# Patient Record
Sex: MALE | Race: WHITE | NOT HISPANIC OR LATINO | Employment: OTHER | URBAN - METROPOLITAN AREA
[De-identification: names, ages, dates, MRNs, and addresses within clinical notes are randomized per-mention and may not be internally consistent; named-entity substitution may affect disease eponyms.]

---

## 2024-01-22 ENCOUNTER — HOSPITAL ENCOUNTER (OUTPATIENT)
Facility: HOSPITAL | Age: 64
Discharge: HOME | End: 2024-01-22
Attending: INTERNAL MEDICINE | Admitting: INTERNAL MEDICINE
Payer: MEDICARE

## 2024-01-22 DIAGNOSIS — I25.10 ATHEROSCLEROSIS OF NATIVE CORONARY ARTERY OF NATIVE HEART, UNSPECIFIED WHETHER ANGINA PRESENT: ICD-10-CM

## 2024-01-22 DIAGNOSIS — I25.118 CORONARY ARTERY DISEASE OF NATIVE ARTERY OF NATIVE HEART WITH STABLE ANGINA PECTORIS: ICD-10-CM

## 2024-01-22 DIAGNOSIS — R07.9 CHEST PAIN: ICD-10-CM

## 2024-01-22 DIAGNOSIS — Z79.01 ANTICOAGULATED: ICD-10-CM

## 2024-01-22 DIAGNOSIS — I25.118 CORONARY ARTERY DISEASE OF NATIVE ARTERY OF NATIVE HEART WITH STABLE ANGINA PECTORIS: Primary | ICD-10-CM

## 2024-01-22 LAB
ALBUMIN SERPL-MCNC: 3.8 G/DL (ref 3.4–4.8)
ALBUMIN/GLOB SERPL: 1.5 RATIO (ref 1.1–2)
ALP SERPL-CCNC: 45 UNIT/L (ref 40–150)
ALT SERPL-CCNC: 31 UNIT/L (ref 0–55)
ANION GAP SERPL CALC-SCNC: 5 MEQ/L
APPEARANCE UR: CLEAR
APTT PPP: 50.3 SECONDS (ref 23.2–33.7)
AST SERPL-CCNC: 23 UNIT/L (ref 5–34)
BACTERIA #/AREA URNS AUTO: ABNORMAL /HPF
BASOPHILS # BLD AUTO: 0.05 X10(3)/MCL
BASOPHILS NFR BLD AUTO: 0.6 %
BILIRUB SERPL-MCNC: 0.4 MG/DL
BILIRUB UR QL STRIP.AUTO: NEGATIVE
BUN SERPL-MCNC: 26.3 MG/DL (ref 8.4–25.7)
BUN SERPL-MCNC: 27 MG/DL (ref 8.4–25.7)
CALCIUM SERPL-MCNC: 8.8 MG/DL (ref 8.8–10)
CALCIUM SERPL-MCNC: 9.5 MG/DL (ref 8.8–10)
CHLORIDE SERPL-SCNC: 105 MMOL/L (ref 98–107)
CHLORIDE SERPL-SCNC: 107 MMOL/L (ref 98–107)
CO2 SERPL-SCNC: 24 MMOL/L (ref 23–31)
CO2 SERPL-SCNC: 27 MMOL/L (ref 23–31)
COLOR UR AUTO: ABNORMAL
CREAT SERPL-MCNC: 1.43 MG/DL (ref 0.73–1.18)
CREAT SERPL-MCNC: 1.55 MG/DL (ref 0.73–1.18)
CREAT/UREA NIT SERPL: 17
EOSINOPHIL # BLD AUTO: 0.35 X10(3)/MCL (ref 0–0.9)
EOSINOPHIL NFR BLD AUTO: 4.5 %
ERYTHROCYTE [DISTWIDTH] IN BLOOD BY AUTOMATED COUNT: 14.1 % (ref 11.5–17)
GFR SERPLBLD CREATININE-BSD FMLA CKD-EPI: 49 MLS/MIN/1.73/M2
GFR SERPLBLD CREATININE-BSD FMLA CKD-EPI: 54 MLS/MIN/1.73/M2
GLOBULIN SER-MCNC: 2.5 GM/DL (ref 2.4–3.5)
GLUCOSE SERPL-MCNC: 110 MG/DL (ref 82–115)
GLUCOSE SERPL-MCNC: 160 MG/DL (ref 82–115)
GLUCOSE UR QL STRIP.AUTO: NORMAL
GROUP & RH: NORMAL
HCT VFR BLD AUTO: 42.7 % (ref 42–52)
HGB BLD-MCNC: 13.9 G/DL (ref 14–18)
IMM GRANULOCYTES # BLD AUTO: 0.02 X10(3)/MCL (ref 0–0.04)
IMM GRANULOCYTES NFR BLD AUTO: 0.3 %
INDIRECT COOMBS: NORMAL
KETONES UR QL STRIP.AUTO: NEGATIVE
LEUKOCYTE ESTERASE UR QL STRIP.AUTO: NEGATIVE
LYMPHOCYTES # BLD AUTO: 2.72 X10(3)/MCL (ref 0.6–4.6)
LYMPHOCYTES NFR BLD AUTO: 34.9 %
MCH RBC QN AUTO: 29.7 PG (ref 27–31)
MCHC RBC AUTO-ENTMCNC: 32.6 G/DL (ref 33–36)
MCV RBC AUTO: 91.2 FL (ref 80–94)
MONOCYTES # BLD AUTO: 0.44 X10(3)/MCL (ref 0.1–1.3)
MONOCYTES NFR BLD AUTO: 5.6 %
MRSA PCR SCRN (OHS): NOT DETECTED
NEUTROPHILS # BLD AUTO: 4.21 X10(3)/MCL (ref 2.1–9.2)
NEUTROPHILS NFR BLD AUTO: 54.1 %
NITRITE UR QL STRIP.AUTO: NEGATIVE
NRBC BLD AUTO-RTO: 0 %
PH UR STRIP.AUTO: 6 [PH]
PLATELET # BLD AUTO: 153 X10(3)/MCL (ref 130–400)
PMV BLD AUTO: 12.2 FL (ref 7.4–10.4)
POTASSIUM SERPL-SCNC: 4.6 MMOL/L (ref 3.5–5.1)
POTASSIUM SERPL-SCNC: 5 MMOL/L (ref 3.5–5.1)
PROT SERPL-MCNC: 6.3 GM/DL (ref 5.8–7.6)
PROT UR QL STRIP.AUTO: NEGATIVE
RBC # BLD AUTO: 4.68 X10(6)/MCL (ref 4.7–6.1)
RBC #/AREA URNS AUTO: ABNORMAL /HPF
RBC UR QL AUTO: NEGATIVE
SODIUM SERPL-SCNC: 137 MMOL/L (ref 136–145)
SODIUM SERPL-SCNC: 138 MMOL/L (ref 136–145)
SP GR UR STRIP.AUTO: >1.05 (ref 1–1.03)
SPECIMEN OUTDATE: NORMAL
SPERM URNS QL MICRO: ABNORMAL /HPF
SQUAMOUS #/AREA URNS LPF: ABNORMAL /HPF
UROBILINOGEN UR STRIP-ACNC: NORMAL
WBC # SPEC AUTO: 7.79 X10(3)/MCL (ref 4.5–11.5)
WBC #/AREA URNS AUTO: ABNORMAL /HPF

## 2024-01-22 PROCEDURE — 25500020 PHARM REV CODE 255: Performed by: INTERNAL MEDICINE

## 2024-01-22 PROCEDURE — 85025 COMPLETE CBC W/AUTO DIFF WBC: CPT | Performed by: INTERNAL MEDICINE

## 2024-01-22 PROCEDURE — 85347 COAGULATION TIME ACTIVATED: CPT | Performed by: INTERNAL MEDICINE

## 2024-01-22 PROCEDURE — 87641 MR-STAPH DNA AMP PROBE: CPT | Performed by: SPECIALIST

## 2024-01-22 PROCEDURE — 27201423 OPTIME MED/SURG SUP & DEVICES STERILE SUPPLY: Performed by: INTERNAL MEDICINE

## 2024-01-22 PROCEDURE — 93799 UNLISTED CV SVC/PROCEDURE: CPT | Performed by: INTERNAL MEDICINE

## 2024-01-22 PROCEDURE — 63600175 PHARM REV CODE 636 W HCPCS: Performed by: INTERNAL MEDICINE

## 2024-01-22 PROCEDURE — 99152 MOD SED SAME PHYS/QHP 5/>YRS: CPT | Performed by: INTERNAL MEDICINE

## 2024-01-22 PROCEDURE — C1887 CATHETER, GUIDING: HCPCS | Performed by: INTERNAL MEDICINE

## 2024-01-22 PROCEDURE — 93010 ELECTROCARDIOGRAM REPORT: CPT | Mod: ,,, | Performed by: INTERNAL MEDICINE

## 2024-01-22 PROCEDURE — 86850 RBC ANTIBODY SCREEN: CPT | Performed by: SPECIALIST

## 2024-01-22 PROCEDURE — 25000003 PHARM REV CODE 250: Performed by: INTERNAL MEDICINE

## 2024-01-22 PROCEDURE — 93005 ELECTROCARDIOGRAM TRACING: CPT

## 2024-01-22 PROCEDURE — 80053 COMPREHEN METABOLIC PANEL: CPT | Performed by: INTERNAL MEDICINE

## 2024-01-22 PROCEDURE — 81001 URINALYSIS AUTO W/SCOPE: CPT | Performed by: INTERNAL MEDICINE

## 2024-01-22 PROCEDURE — C1894 INTRO/SHEATH, NON-LASER: HCPCS | Performed by: INTERNAL MEDICINE

## 2024-01-22 PROCEDURE — 93459 L HRT ART/GRFT ANGIO: CPT | Performed by: INTERNAL MEDICINE

## 2024-01-22 PROCEDURE — 85730 THROMBOPLASTIN TIME PARTIAL: CPT | Performed by: INTERNAL MEDICINE

## 2024-01-22 PROCEDURE — 99153 MOD SED SAME PHYS/QHP EA: CPT | Performed by: INTERNAL MEDICINE

## 2024-01-22 PROCEDURE — C1769 GUIDE WIRE: HCPCS | Performed by: INTERNAL MEDICINE

## 2024-01-22 PROCEDURE — 80048 BASIC METABOLIC PNL TOTAL CA: CPT | Performed by: INTERNAL MEDICINE

## 2024-01-22 RX ORDER — MUPIROCIN 20 MG/G
OINTMENT TOPICAL
Status: CANCELLED | OUTPATIENT
Start: 2024-01-22 | End: 2024-01-22

## 2024-01-22 RX ORDER — MORPHINE SULFATE 4 MG/ML
2 INJECTION, SOLUTION INTRAMUSCULAR; INTRAVENOUS EVERY 4 HOURS PRN
Status: DISCONTINUED | OUTPATIENT
Start: 2024-01-22 | End: 2024-01-22 | Stop reason: HOSPADM

## 2024-01-22 RX ORDER — FENTANYL CITRATE 50 UG/ML
INJECTION, SOLUTION INTRAMUSCULAR; INTRAVENOUS
Status: DISCONTINUED | OUTPATIENT
Start: 2024-01-22 | End: 2024-01-22 | Stop reason: HOSPADM

## 2024-01-22 RX ORDER — SODIUM CHLORIDE 9 MG/ML
INJECTION, SOLUTION INTRAVENOUS ONCE
Status: COMPLETED | OUTPATIENT
Start: 2024-01-22 | End: 2024-01-22

## 2024-01-22 RX ORDER — NITROGLYCERIN 20 MG/100ML
INJECTION INTRAVENOUS
Status: DISCONTINUED | OUTPATIENT
Start: 2024-01-22 | End: 2024-01-22 | Stop reason: HOSPADM

## 2024-01-22 RX ORDER — HEPARIN SODIUM 1000 [USP'U]/ML
INJECTION, SOLUTION INTRAVENOUS; SUBCUTANEOUS
Status: DISCONTINUED | OUTPATIENT
Start: 2024-01-22 | End: 2024-01-22 | Stop reason: HOSPADM

## 2024-01-22 RX ORDER — ASPIRIN 81 MG/1
81 TABLET ORAL ONCE
COMMUNITY

## 2024-01-22 RX ORDER — DIAZEPAM 5 MG/1
10 TABLET ORAL ONCE
Status: COMPLETED | OUTPATIENT
Start: 2024-01-22 | End: 2024-01-22

## 2024-01-22 RX ORDER — METOPROLOL TARTRATE 25 MG/1
25 TABLET, FILM COATED ORAL 2 TIMES DAILY
Status: ON HOLD | COMMUNITY
End: 2024-02-06 | Stop reason: HOSPADM

## 2024-01-22 RX ORDER — MIDAZOLAM HYDROCHLORIDE 1 MG/ML
INJECTION INTRAMUSCULAR; INTRAVENOUS
Status: DISCONTINUED | OUTPATIENT
Start: 2024-01-22 | End: 2024-01-22 | Stop reason: HOSPADM

## 2024-01-22 RX ORDER — HYDROCODONE BITARTRATE AND ACETAMINOPHEN 500; 5 MG/1; MG/1
TABLET ORAL
Status: CANCELLED | OUTPATIENT
Start: 2024-01-22

## 2024-01-22 RX ORDER — ASPIRIN 325 MG
50000 TABLET, DELAYED RELEASE (ENTERIC COATED) ORAL
COMMUNITY
Start: 2024-01-05

## 2024-01-22 RX ORDER — DIPHENHYDRAMINE HCL 25 MG
50 CAPSULE ORAL
Status: DISCONTINUED | OUTPATIENT
Start: 2024-01-22 | End: 2024-01-22 | Stop reason: HOSPADM

## 2024-01-22 RX ORDER — ATORVASTATIN CALCIUM 20 MG/1
20 TABLET, FILM COATED ORAL NIGHTLY
Status: ON HOLD | COMMUNITY
Start: 2024-01-18 | End: 2024-02-06

## 2024-01-22 RX ORDER — TACROLIMUS 1 MG/1
1 CAPSULE ORAL
COMMUNITY
Start: 2023-10-25

## 2024-01-22 RX ORDER — SODIUM CHLORIDE 0.9 % (FLUSH) 0.9 %
10 SYRINGE (ML) INJECTION
Status: DISCONTINUED | OUTPATIENT
Start: 2024-01-22 | End: 2024-01-22 | Stop reason: HOSPADM

## 2024-01-22 RX ORDER — ESCITALOPRAM OXALATE 10 MG/1
10 TABLET ORAL EVERY MORNING
COMMUNITY
Start: 2023-12-12

## 2024-01-22 RX ORDER — VERAPAMIL HYDROCHLORIDE 2.5 MG/ML
INJECTION, SOLUTION INTRAVENOUS
Status: DISCONTINUED | OUTPATIENT
Start: 2024-01-22 | End: 2024-01-22 | Stop reason: HOSPADM

## 2024-01-22 RX ORDER — AMLODIPINE BESYLATE 5 MG/1
5 TABLET ORAL NIGHTLY
Status: ON HOLD | COMMUNITY
End: 2024-02-06 | Stop reason: HOSPADM

## 2024-01-22 RX ORDER — LIDOCAINE HYDROCHLORIDE 10 MG/ML
INJECTION INFILTRATION; PERINEURAL
Status: DISCONTINUED | OUTPATIENT
Start: 2024-01-22 | End: 2024-01-22 | Stop reason: HOSPADM

## 2024-01-22 RX ORDER — CLOPIDOGREL BISULFATE 75 MG/1
75 TABLET ORAL DAILY
Status: ON HOLD | COMMUNITY
End: 2024-02-06 | Stop reason: HOSPADM

## 2024-01-22 RX ORDER — SODIUM CHLORIDE 9 MG/ML
INJECTION, SOLUTION INTRAVENOUS CONTINUOUS
Status: DISCONTINUED | OUTPATIENT
Start: 2024-01-22 | End: 2024-01-22 | Stop reason: HOSPADM

## 2024-01-22 RX ORDER — OLMESARTAN MEDOXOMIL 20 MG/1
20 TABLET ORAL DAILY
Status: ON HOLD | COMMUNITY
Start: 2024-01-18 | End: 2024-02-06 | Stop reason: HOSPADM

## 2024-01-22 RX ORDER — LANOLIN ALCOHOL/MO/W.PET/CERES
400 CREAM (GRAM) TOPICAL 2 TIMES DAILY
COMMUNITY

## 2024-01-22 RX ORDER — ACETAMINOPHEN 325 MG/1
650 TABLET ORAL EVERY 4 HOURS PRN
Status: DISCONTINUED | OUTPATIENT
Start: 2024-01-22 | End: 2024-01-22 | Stop reason: HOSPADM

## 2024-01-22 RX ORDER — HYDROCODONE BITARTRATE AND ACETAMINOPHEN 5; 325 MG/1; MG/1
1 TABLET ORAL EVERY 4 HOURS PRN
Status: DISCONTINUED | OUTPATIENT
Start: 2024-01-22 | End: 2024-01-22 | Stop reason: HOSPADM

## 2024-01-22 RX ORDER — ALENDRONATE SODIUM 35 MG/1
35 TABLET ORAL
COMMUNITY
Start: 2023-11-01

## 2024-01-22 RX ORDER — ONDANSETRON HYDROCHLORIDE 2 MG/ML
4 INJECTION, SOLUTION INTRAVENOUS EVERY 8 HOURS PRN
Status: DISCONTINUED | OUTPATIENT
Start: 2024-01-22 | End: 2024-01-22 | Stop reason: HOSPADM

## 2024-01-22 RX ORDER — HYDRALAZINE HYDROCHLORIDE 20 MG/ML
10 INJECTION INTRAMUSCULAR; INTRAVENOUS EVERY 4 HOURS PRN
Status: DISCONTINUED | OUTPATIENT
Start: 2024-01-22 | End: 2024-01-22 | Stop reason: HOSPADM

## 2024-01-22 RX ADMIN — DIAZEPAM 10 MG: 5 TABLET ORAL at 10:01

## 2024-01-22 RX ADMIN — SODIUM CHLORIDE: 9 INJECTION, SOLUTION INTRAVENOUS at 08:01

## 2024-01-22 RX ADMIN — SODIUM CHLORIDE: 9 INJECTION, SOLUTION INTRAVENOUS at 12:01

## 2024-01-22 RX ADMIN — DIPHENHYDRAMINE HYDROCHLORIDE 50 MG: 25 CAPSULE ORAL at 10:01

## 2024-01-22 NOTE — Clinical Note
The catheter was inserted into the and was inserted over the wire into the ostium   left main. An angiography was performed of the left coronary arteries. Multiple views were taken. The angiography was performed via power injection.

## 2024-01-22 NOTE — H&P (VIEW-ONLY)
[facility]   Consult Note  Cardiothoracic Surgery    SUBJECTIVE:     Chief Complaint/Reason for Admission: abnormal pet    History of Present Illness:  Patient is a 63 y.o. male presents with abnormal pet, [facility] shows cad  Hx liver transplant.      Family History of Heart Disease: no    No current facility-administered medications on file prior to encounter.     Current Outpatient Medications on File Prior to Encounter   Medication Sig Dispense Refill    alendronate (FOSAMAX) 35 MG tablet Take 35 mg by mouth every 7 days.      amLODIPine (NORVASC) 5 MG tablet Take 5 mg by mouth.      aspirin (ECOTRIN) 81 MG EC tablet aspirin 81 mg tablet,delayed release      atorvastatin (LIPITOR) 20 MG tablet Take 20 mg by mouth.      cholecalciferol, vitamin D3, 1,250 mcg (50,000 unit) capsule Take 50,000 Units by mouth every 7 days.      clopidogreL (PLAVIX) 75 mg tablet Take 75 mg by mouth.      EScitalopram oxalate (LEXAPRO) 10 MG tablet Take 10 mg by mouth every morning.      magnesium oxide (MAG-OX) 400 mg (241.3 mg magnesium) tablet Take 400 mg by mouth 2 (two) times daily.      metoprolol tartrate (LOPRESSOR) 25 MG tablet Take 25 mg by mouth 2 (two) times daily.      olmesartan (BENICAR) 20 MG tablet Take 20 mg by mouth.      rifAXIMin (XIFAXAN) 550 mg Tab Take 550 mg by mouth.      tacrolimus (PROGRAF) 1 MG Cap Take 1 mg by mouth.      [DISCONTINUED] pantoprazole (PROTONIX) 40 MG tablet Take 40 mg by mouth once daily.      [DISCONTINUED] propranolol (INDERAL) 10 MG tablet Take 10 mg by mouth 2 (two) times daily.          Review of patient's allergies indicates:  No Known Allergies     Past Medical History:   Diagnosis Date    CAD (coronary artery disease)     Cirrhosis     Depression     HCV (hepatitis C virus)         Past Surgical History:   Procedure Laterality Date    APPENDECTOMY      CHOLECYSTECTOMY      laprascopic    LIVER TRANSPLANT             Review of Systems:  Review of Systems   All other systems  reviewed and are negative.       OBJECTIVE:        Physical Exam:  Physical Exam  Vitals reviewed.   HENT:      Head: Normocephalic.      Right Ear: Tympanic membrane normal.      Left Ear: Tympanic membrane normal.      Nose: Nose normal.      Mouth/Throat:      Mouth: Mucous membranes are moist.   Eyes:      Pupils: Pupils are equal, round, and reactive to light.   Cardiovascular:      Rate and Rhythm: Normal rate and regular rhythm.      Pulses: Normal pulses.   Pulmonary:      Effort: Pulmonary effort is normal.      Breath sounds: Normal breath sounds.   Abdominal:      Palpations: Abdomen is soft.   Musculoskeletal:         General: Normal range of motion.      Cervical back: Normal range of motion.   Skin:     General: Skin is warm.   Neurological:      General: No focal deficit present.      Mental Status: He is alert.   Psychiatric:         Mood and Affect: Mood normal.          Laboratory:  I have reviewed all pertinent lab results within the past 24 hours.    Diagnostic Results:  Cardiac Cath: Reviewed          ASSESSMENT/PLAN:     A: CAD  P: TOBI 1/31 - will reach out to liver transplant md about immuno drugs

## 2024-01-22 NOTE — DISCHARGE SUMMARY
[facility] - Cath Lab Services  Discharge Note  Short Stay    Procedure(s) (LRB):  Left heart cath (N/A)      OUTCOME: Patient tolerated treatment/procedure well without complication and is now ready for discharge.    DISPOSITION: Home or Self Care    FINAL DIAGNOSIS:  <principal problem not specified>    FOLLOWUP: In clinic    DISCHARGE INSTRUCTIONS:  No discharge procedures on file.     TIME SPENT ON DISCHARGE:

## 2024-01-22 NOTE — Clinical Note
The catheter was removed from the and was repositioned into the ostial LIMA graft. An angiography was performed of the LIMA. The angiography was performed via power injection.

## 2024-01-22 NOTE — CONSULTS
[facility]   Consult Note  Cardiothoracic Surgery    SUBJECTIVE:     Chief Complaint/Reason for Admission: abnormal pet    History of Present Illness:  Patient is a 63 y.o. male presents with abnormal pet, [facility] shows cad  Hx liver transplant.      Family History of Heart Disease: no    No current facility-administered medications on file prior to encounter.     Current Outpatient Medications on File Prior to Encounter   Medication Sig Dispense Refill    alendronate (FOSAMAX) 35 MG tablet Take 35 mg by mouth every 7 days.      amLODIPine (NORVASC) 5 MG tablet Take 5 mg by mouth.      aspirin (ECOTRIN) 81 MG EC tablet aspirin 81 mg tablet,delayed release      atorvastatin (LIPITOR) 20 MG tablet Take 20 mg by mouth.      cholecalciferol, vitamin D3, 1,250 mcg (50,000 unit) capsule Take 50,000 Units by mouth every 7 days.      clopidogreL (PLAVIX) 75 mg tablet Take 75 mg by mouth.      EScitalopram oxalate (LEXAPRO) 10 MG tablet Take 10 mg by mouth every morning.      magnesium oxide (MAG-OX) 400 mg (241.3 mg magnesium) tablet Take 400 mg by mouth 2 (two) times daily.      metoprolol tartrate (LOPRESSOR) 25 MG tablet Take 25 mg by mouth 2 (two) times daily.      olmesartan (BENICAR) 20 MG tablet Take 20 mg by mouth.      rifAXIMin (XIFAXAN) 550 mg Tab Take 550 mg by mouth.      tacrolimus (PROGRAF) 1 MG Cap Take 1 mg by mouth.      [DISCONTINUED] pantoprazole (PROTONIX) 40 MG tablet Take 40 mg by mouth once daily.      [DISCONTINUED] propranolol (INDERAL) 10 MG tablet Take 10 mg by mouth 2 (two) times daily.          Review of patient's allergies indicates:  No Known Allergies     Past Medical History:   Diagnosis Date    CAD (coronary artery disease)     Cirrhosis     Depression     HCV (hepatitis C virus)         Past Surgical History:   Procedure Laterality Date    APPENDECTOMY      CHOLECYSTECTOMY      laprascopic    LIVER TRANSPLANT             Review of Systems:  Review of Systems   All other systems  reviewed and are negative.       OBJECTIVE:        Physical Exam:  Physical Exam  Vitals reviewed.   HENT:      Head: Normocephalic.      Right Ear: Tympanic membrane normal.      Left Ear: Tympanic membrane normal.      Nose: Nose normal.      Mouth/Throat:      Mouth: Mucous membranes are moist.   Eyes:      Pupils: Pupils are equal, round, and reactive to light.   Cardiovascular:      Rate and Rhythm: Normal rate and regular rhythm.      Pulses: Normal pulses.   Pulmonary:      Effort: Pulmonary effort is normal.      Breath sounds: Normal breath sounds.   Abdominal:      Palpations: Abdomen is soft.   Musculoskeletal:         General: Normal range of motion.      Cervical back: Normal range of motion.   Skin:     General: Skin is warm.   Neurological:      General: No focal deficit present.      Mental Status: He is alert.   Psychiatric:         Mood and Affect: Mood normal.          Laboratory:  I have reviewed all pertinent lab results within the past 24 hours.    Diagnostic Results:  Cardiac Cath: Reviewed          ASSESSMENT/PLAN:     A: CAD  P: TOBI 1/31 - will reach out to liver transplant md about immuno drugs

## 2024-01-22 NOTE — INTERVAL H&P NOTE
Patient name: Radu Rock  MRN: [id number]  : 1960  Cath Lab Procedure H&P Update    Pre-Procedure Assessment:    I saw and examined the patient face to face. The patient has been re-evaluated and his condition is unchanged. The reason for admission, procedure and care is still present.  Based on the patients H&P, pre-procedure physical exam, relevant diagnostic studies, NPO status and information obtained from the patient, I determined the patient is an appropriate candidate for the proposed procedure and anesthesia planned. I further certify the anesthesia risks, benefits and options have been explained to the patient to which he agrees as documented on the procedural consent.

## 2024-01-24 ENCOUNTER — ANESTHESIA EVENT (OUTPATIENT)
Dept: SURGERY | Facility: HOSPITAL | Age: 64
DRG: 235 | End: 2024-01-24
Payer: MEDICARE

## 2024-01-25 ENCOUNTER — LAB VISIT (OUTPATIENT)
Dept: LAB | Facility: HOSPITAL | Age: 64
End: 2024-01-25
Attending: SPECIALIST
Payer: MEDICARE

## 2024-01-25 VITALS
SYSTOLIC BLOOD PRESSURE: 116 MMHG | BODY MASS INDEX: 32.72 KG/M2 | DIASTOLIC BLOOD PRESSURE: 66 MMHG | HEIGHT: 69 IN | OXYGEN SATURATION: 97 % | TEMPERATURE: 98 F | RESPIRATION RATE: 16 BRPM | WEIGHT: 220.88 LBS | HEART RATE: 58 BPM

## 2024-01-25 DIAGNOSIS — I25.118 CORONARY ARTERY DISEASE OF NATIVE ARTERY OF NATIVE HEART WITH STABLE ANGINA PECTORIS: ICD-10-CM

## 2024-01-25 DIAGNOSIS — I25.118 CORONARY ARTERY DISEASE OF NATIVE ARTERY OF NATIVE HEART WITH STABLE ANGINA PECTORIS: Primary | ICD-10-CM

## 2024-01-25 DIAGNOSIS — Z79.01 ANTICOAGULATED: ICD-10-CM

## 2024-01-25 LAB
CLOSURE TME COLL+ADP BLD: >300 SECONDS (ref 46–119)
CLOSURE TME COLL+EPINEP BLD: 167 SECONDS (ref 68–183)
GROUP & RH: NORMAL
INDIRECT COOMBS: NORMAL
SPECIMEN OUTDATE: NORMAL

## 2024-01-25 PROCEDURE — 85576 BLOOD PLATELET AGGREGATION: CPT

## 2024-01-25 PROCEDURE — 86901 BLOOD TYPING SEROLOGIC RH(D): CPT | Performed by: SPECIALIST

## 2024-01-25 PROCEDURE — 36415 COLL VENOUS BLD VENIPUNCTURE: CPT

## 2024-01-30 NOTE — PRE-PROCEDURE INSTRUCTIONS
"[facility]: Outpatient Surgery  Preprocedure Check-In Instructions     Your arrival time for your surgery or procedure is __per MD____.  We ask patients to arrive about 2 hours before surgery to allow for enough time to review your health history & medications, start your IV, complete any outstanding labwork or tests, and meet your Anesthesiologist.    Expectations: "Because of inconsistent procedure completion times, an unexpected wait may occur. The Physicians would like you to be here to prepare in the event they run ahead of time. We will make you as comfortable as possible and keep you informed. We apologize in advance if this happens."    You will arrive at [facility], [address], [place].  Enter through the [place] entrance next to the Emergency Room, and come to the 6th floor to the Outpatient Surgery Department.     Visitory Policy:  You are allowed 2 adult visitors to be with you in the hospital. All hospital visitors should be in good current health.  No small children.     What to Bring:  Please have your ID, insurance cards, and all home medication bottles with you at check in.  Bring your CPAP machine if one is used at home.     Fasting:  Nothing to eat or drink after midnight the night before your procedure. This includes no ice, gum, hard candies, and/or tobacco products.  Follow your doctor's instructions for taking any medications on the morning of your procedure.  If no instructions for taking medications were given, do not take any medications but bring your medications in their bottles to your procedure check in.     Follow your doctor's preoperative instructions regarding skin prep, bowel prep, bathing, or showering prior to your procedure.  If any special soaps were provided to you, please use according to your doctor's instructions. If no instructions were given from your doctor, take a good bath or shower with antibacterial soap the night " before and the morning of your procedure.  On the morning of procedure, wear loose, comfortable clothing.  No lotions, makeup, perfumes, colognes, deodorant, or jewelry to your procedure.  Removable items (glasses, contact lenses, dentures, retainers, hearing aids) need to be removed for your procedure.  Bring your storage containers for these items if you wear them.     Artificial nails, body jewelry, eyelash extensions, and/or hair extensions with metal clips are not allowed during your surgery.  If you currently wear any of these items, please arrange for them to be removed prior to your arrival to the hospital.     Outpatient or Same Day Surgeries:  Any patients receiving sedation/anesthesia are advised not to drive for 24 hours after their procedure.  We do not allow patients to drive themselves home after discharge.  If you are going home after your procedure, please have someone available to drive you home from the hospital.        You may call the Outpatient Surgery Department at [phone] with any questions or concerns.  We are looking forward to meeting you and taking great care of you for your procedure.  Thank you for choosing [facility] for your surgical needs.

## 2024-01-31 ENCOUNTER — HOSPITAL ENCOUNTER (INPATIENT)
Facility: HOSPITAL | Age: 64
LOS: 6 days | Discharge: HOME HEALTH | DRG: 235 | End: 2024-02-06
Attending: SPECIALIST | Admitting: INTERNAL MEDICINE
Payer: MEDICARE

## 2024-01-31 ENCOUNTER — ANESTHESIA (OUTPATIENT)
Dept: SURGERY | Facility: HOSPITAL | Age: 64
DRG: 235 | End: 2024-01-31
Payer: MEDICARE

## 2024-01-31 DIAGNOSIS — I25.10 CORONARY ARTERY DISEASE: ICD-10-CM

## 2024-01-31 DIAGNOSIS — I48.91 A-FIB: ICD-10-CM

## 2024-01-31 DIAGNOSIS — Z79.01 ANTICOAGULATED: ICD-10-CM

## 2024-01-31 DIAGNOSIS — I25.10 CAD (CORONARY ARTERY DISEASE): ICD-10-CM

## 2024-01-31 DIAGNOSIS — I25.118 CORONARY ARTERY DISEASE OF NATIVE ARTERY OF NATIVE HEART WITH STABLE ANGINA PECTORIS: ICD-10-CM

## 2024-01-31 LAB
ALLENS TEST BLOOD GAS (OHS): ABNORMAL
ALLENS TEST BLOOD GAS (OHS): ABNORMAL
ANION GAP SERPL CALC-SCNC: 10 MEQ/L
APTT PPP: 27 SECONDS (ref 23.2–33.7)
APTT PPP: 29.4 SECONDS (ref 23.2–33.7)
BASE EXCESS BLD CALC-SCNC: -4.5 MMOL/L
BASE EXCESS BLD CALC-SCNC: 1.1 MMOL/L (ref -2–2)
BASOPHILS # BLD AUTO: 0.09 X10(3)/MCL
BASOPHILS NFR BLD AUTO: 0.4 %
BLOOD GAS SAMPLE TYPE (OHS): ABNORMAL
BLOOD GAS SAMPLE TYPE (OHS): ABNORMAL
BSA FOR ECHO PROCEDURE: 2.23 M2
BUN SERPL-MCNC: 18.7 MG/DL (ref 8.4–25.7)
CA-I BLD-SCNC: 1.17 MMOL/L (ref 1.12–1.23)
CA-I BLD-SCNC: 1.19 MMOL/L (ref 1.12–1.23)
CALCIUM SERPL-MCNC: 9.5 MG/DL (ref 8.8–10)
CHLORIDE SERPL-SCNC: 114 MMOL/L (ref 98–107)
CO2 BLDA-SCNC: 23 MMOL/L
CO2 BLDA-SCNC: 28 MMOL/L
CO2 SERPL-SCNC: 21 MMOL/L (ref 23–31)
COHGB MFR BLDA: 0.3 % (ref 0.5–1.5)
CPAP BLOOD GAS (OHS): 5 CM H2O
CREAT SERPL-MCNC: 1.13 MG/DL (ref 0.73–1.18)
CREAT/UREA NIT SERPL: 17
DRAWN BY BLOOD GAS (OHS): ABNORMAL
DRAWN BY BLOOD GAS (OHS): ABNORMAL
EOSINOPHIL # BLD AUTO: 0.26 X10(3)/MCL (ref 0–0.9)
EOSINOPHIL NFR BLD AUTO: 1.1 %
ERYTHROCYTE [DISTWIDTH] IN BLOOD BY AUTOMATED COUNT: 14.4 % (ref 11.5–17)
FIO2: 100
FIO2: 55
FIO2: 70
GFR SERPLBLD CREATININE-BSD FMLA CKD-EPI: >60 MLS/MIN/1.73/M2
GLUCOSE SERPL-MCNC: 107 MG/DL (ref 70–110)
GLUCOSE SERPL-MCNC: 159 MG/DL (ref 70–110)
GLUCOSE SERPL-MCNC: 159 MG/DL (ref 82–115)
GLUCOSE SERPL-MCNC: 168 MG/DL (ref 70–110)
GLUCOSE SERPL-MCNC: 169 MG/DL (ref 70–110)
GLUCOSE SERPL-MCNC: 179 MG/DL (ref 70–110)
GLUCOSE SERPL-MCNC: 192 MG/DL (ref 70–110)
GLUCOSE SERPL-MCNC: 199 MG/DL (ref 70–110)
HCO3 BLDA-SCNC: 21.7 MMOL/L (ref 22–26)
HCO3 BLDA-SCNC: 26.6 MMOL/L (ref 22–26)
HCO3 UR-SCNC: 22.4 MMOL/L (ref 24–28)
HCO3 UR-SCNC: 23.2 MMOL/L (ref 24–28)
HCO3 UR-SCNC: 24.6 MMOL/L (ref 24–28)
HCO3 UR-SCNC: 25 MMOL/L (ref 24–28)
HCO3 UR-SCNC: 25.3 MMOL/L (ref 24–28)
HCO3 UR-SCNC: 26.3 MMOL/L (ref 24–28)
HCO3 UR-SCNC: 26.8 MMOL/L (ref 24–28)
HCT VFR BLD AUTO: 30.1 % (ref 42–52)
HCT VFR BLD AUTO: 33.9 % (ref 42–52)
HCT VFR BLD CALC: 26 %PCV (ref 36–54)
HCT VFR BLD CALC: 26 %PCV (ref 36–54)
HCT VFR BLD CALC: 27 %PCV (ref 36–54)
HCT VFR BLD CALC: 27 %PCV (ref 36–54)
HCT VFR BLD CALC: 30 %PCV (ref 36–54)
HCT VFR BLD CALC: 35 %PCV (ref 36–54)
HCT VFR BLD CALC: 36 %PCV (ref 36–54)
HGB BLD-MCNC: 10 G/DL
HGB BLD-MCNC: 10.2 G/DL (ref 14–18)
HGB BLD-MCNC: 11 G/DL (ref 14–18)
HGB BLD-MCNC: 12 G/DL
HGB BLD-MCNC: 12 G/DL
HGB BLD-MCNC: 9 G/DL
IMM GRANULOCYTES # BLD AUTO: 0.14 X10(3)/MCL (ref 0–0.04)
IMM GRANULOCYTES NFR BLD AUTO: 0.6 %
INHALED O2 CONCENTRATION: 21 %
INHALED O2 CONCENTRATION: 35 %
INR PPP: 1.2
INR PPP: 2
LYMPHOCYTES # BLD AUTO: 4.05 X10(3)/MCL (ref 0.6–4.6)
LYMPHOCYTES NFR BLD AUTO: 17.8 %
MCH RBC QN AUTO: 30 PG (ref 27–31)
MCHC RBC AUTO-ENTMCNC: 32.4 G/DL (ref 33–36)
MCV RBC AUTO: 92.4 FL (ref 80–94)
METHGB MFR BLDA: 0 % (ref 0.4–1.5)
MODE (OHS): ABNORMAL
MONOCYTES # BLD AUTO: 1.65 X10(3)/MCL (ref 0.1–1.3)
MONOCYTES NFR BLD AUTO: 7.3 %
NEUTROPHILS # BLD AUTO: 16.5 X10(3)/MCL (ref 2.1–9.2)
NEUTROPHILS NFR BLD AUTO: 72.8 %
NRBC BLD AUTO-RTO: 0 %
O2 HB BLOOD GAS (OHS): 92.6 % (ref 94–97)
OXYGEN DEVICE BLOOD GAS (OHS): ABNORMAL
OXYHGB MFR BLDA: 12.8 G/DL (ref 12–16)
PCO2 BLDA: 42.1 MMHG (ref 35–45)
PCO2 BLDA: 43 MMHG (ref 35–45)
PCO2 BLDA: 43 MMHG (ref 35–45)
PCO2 BLDA: 43.5 MMHG (ref 35–45)
PCO2 BLDA: 44.8 MMHG (ref 35–45)
PCO2 BLDA: 45 MMHG (ref 35–45)
PCO2 BLDA: 45.2 MMHG (ref 35–45)
PCO2 BLDA: 51.4 MMHG (ref 35–45)
PCO2 BLDA: 56.4 MMHG (ref 35–45)
PH BLDA: 7.31 [PH] (ref 7.35–7.45)
PH BLDA: 7.38 [PH] (ref 7.35–7.45)
PH SMN: 7.29 [PH] (ref 7.35–7.45)
PH SMN: 7.31 [PH] (ref 7.35–7.45)
PH SMN: 7.32 [PH] (ref 7.35–7.45)
PH SMN: 7.35 [PH] (ref 7.35–7.45)
PH SMN: 7.35 [PH] (ref 7.35–7.45)
PH SMN: 7.37 [PH] (ref 7.35–7.45)
PH SMN: 7.37 [PH] (ref 7.35–7.45)
PLATELET # BLD AUTO: 102 X10(3)/MCL (ref 130–400)
PMV BLD AUTO: 12.7 FL (ref 7.4–10.4)
PO2 BLDA: 233 MMHG (ref 80–100)
PO2 BLDA: 252 MMHG (ref 80–100)
PO2 BLDA: 301 MMHG (ref 80–100)
PO2 BLDA: 302 MMHG (ref 40–60)
PO2 BLDA: 318 MMHG (ref 40–60)
PO2 BLDA: 38 MMHG (ref 40–60)
PO2 BLDA: 44 MMHG (ref 40–60)
PO2 BLDA: 71 MMHG (ref 80–100)
PO2 BLDA: 79 MMHG (ref 80–100)
POC BE: -1 MMOL/L
POC BE: -1 MMOL/L
POC BE: -2 MMOL/L
POC BE: -4 MMOL/L
POC BE: 0 MMOL/L
POC IONIZED CALCIUM: 0.96 MMOL/L (ref 1.06–1.42)
POC IONIZED CALCIUM: 1.02 MMOL/L (ref 1.06–1.42)
POC IONIZED CALCIUM: 1.06 MMOL/L (ref 1.06–1.42)
POC IONIZED CALCIUM: 1.19 MMOL/L (ref 1.06–1.42)
POC IONIZED CALCIUM: 1.24 MMOL/L (ref 1.06–1.42)
POC IONIZED CALCIUM: 1.38 MMOL/L (ref 1.06–1.42)
POC IONIZED CALCIUM: 1.43 MMOL/L (ref 1.06–1.42)
POC PCO2 TEMP: 39.1 MMHG
POC PCO2 TEMP: 40.3 MMHG
POC PCO2 TEMP: 41.7 MMHG
POC PCO2 TEMP: 43.3 MMHG
POC PCO2 TEMP: 49.2 MMHG
POC PCO2 TEMP: 53.9 MMHG
POC PH TEMP: 7.3
POC PH TEMP: 7.33
POC PH TEMP: 7.36
POC PH TEMP: 7.37
POC PH TEMP: 7.39
POC PH TEMP: 7.4
POC PO2 TEMP: 228 MMHG
POC PO2 TEMP: 291 MMHG
POC PO2 TEMP: 298 MMHG
POC PO2 TEMP: 313 MMHG
POC PO2 TEMP: 36 MMHG
POC PO2 TEMP: 41 MMHG
POC SATURATED O2: 100 % (ref 95–100)
POC SATURATED O2: 65 % (ref 95–100)
POC SATURATED O2: 79 % (ref 95–100)
POC TCO2: 24 MMOL/L (ref 23–27)
POC TCO2: 24 MMOL/L (ref 24–29)
POC TCO2: 26 MMOL/L (ref 23–27)
POC TCO2: 26 MMOL/L (ref 24–29)
POC TCO2: 27 MMOL/L (ref 24–29)
POC TCO2: 28 MMOL/L (ref 23–27)
POC TCO2: 28 MMOL/L (ref 24–29)
POC TEMPERATURE: ABNORMAL
POC TEMPERATURE: NORMAL
POCT GLUCOSE: 108 MG/DL (ref 70–110)
POCT GLUCOSE: 109 MG/DL (ref 70–110)
POCT GLUCOSE: 117 MG/DL (ref 70–110)
POCT GLUCOSE: 122 MG/DL (ref 70–110)
POCT GLUCOSE: 129 MG/DL (ref 70–110)
POCT GLUCOSE: 131 MG/DL (ref 70–110)
POCT GLUCOSE: 138 MG/DL (ref 70–110)
POCT GLUCOSE: 147 MG/DL (ref 70–110)
POCT GLUCOSE: 150 MG/DL (ref 70–110)
POCT GLUCOSE: 96 MG/DL (ref 70–110)
POTASSIUM BLD-SCNC: 4 MMOL/L (ref 3.5–5.1)
POTASSIUM BLD-SCNC: 4.7 MMOL/L (ref 3.5–5.1)
POTASSIUM BLD-SCNC: 4.8 MMOL/L (ref 3.5–5.1)
POTASSIUM BLD-SCNC: 5.3 MMOL/L (ref 3.5–5.1)
POTASSIUM BLD-SCNC: 5.5 MMOL/L (ref 3.5–5.1)
POTASSIUM BLD-SCNC: 6.1 MMOL/L (ref 3.5–5.1)
POTASSIUM BLD-SCNC: 7.2 MMOL/L (ref 3.5–5.1)
POTASSIUM BLOOD GAS (OHS): 3.6 MMOL/L (ref 3.5–5)
POTASSIUM BLOOD GAS (OHS): 4.5 MMOL/L (ref 3.5–5)
POTASSIUM SERPL-SCNC: 4 MMOL/L (ref 3.5–5.1)
POTASSIUM SERPL-SCNC: 4.1 MMOL/L (ref 3.5–5.1)
PROTHROMBIN TIME: 15.3 SECONDS (ref 12.5–14.5)
PROTHROMBIN TIME: 22.7 SECONDS (ref 12.5–14.5)
PS (OHS): 10 CMH2O
RBC # BLD AUTO: 3.67 X10(6)/MCL (ref 4.7–6.1)
SAMPLE SITE BLOOD GAS (OHS): ABNORMAL
SAMPLE SITE BLOOD GAS (OHS): ABNORMAL
SAMPLE: ABNORMAL
SAMPLE: NORMAL
SAO2 % BLDA: 93.7 %
SAO2 % BLDA: 94 %
SODIUM BLD-SCNC: 135 MMOL/L (ref 136–145)
SODIUM BLD-SCNC: 136 MMOL/L (ref 136–145)
SODIUM BLD-SCNC: 137 MMOL/L (ref 136–145)
SODIUM BLD-SCNC: 139 MMOL/L (ref 136–145)
SODIUM BLD-SCNC: 140 MMOL/L (ref 136–145)
SODIUM BLD-SCNC: 142 MMOL/L (ref 136–145)
SODIUM BLD-SCNC: 142 MMOL/L (ref 136–145)
SODIUM BLOOD GAS (OHS): 137 MMOL/L (ref 137–145)
SODIUM BLOOD GAS (OHS): 141 MMOL/L (ref 137–145)
SODIUM SERPL-SCNC: 145 MMOL/L (ref 136–145)
WBC # SPEC AUTO: 22.69 X10(3)/MCL (ref 4.5–11.5)

## 2024-01-31 PROCEDURE — 99900035 HC TECH TIME PER 15 MIN (STAT)

## 2024-01-31 PROCEDURE — 82803 BLOOD GASES ANY COMBINATION: CPT

## 2024-01-31 PROCEDURE — 63600175 PHARM REV CODE 636 W HCPCS: Mod: JZ,JG | Performed by: PHYSICIAN ASSISTANT

## 2024-01-31 PROCEDURE — 33518 CABG ARTERY-VEIN TWO: CPT | Mod: AS,,, | Performed by: PHYSICIAN ASSISTANT

## 2024-01-31 PROCEDURE — 37000009 HC ANESTHESIA EA ADD 15 MINS: Performed by: SPECIALIST

## 2024-01-31 PROCEDURE — 33508 ENDOSCOPIC VEIN HARVEST: CPT | Mod: 59,,, | Performed by: SPECIALIST

## 2024-01-31 PROCEDURE — 76937 US GUIDE VASCULAR ACCESS: CPT | Mod: 26,,, | Performed by: NURSE ANESTHETIST, CERTIFIED REGISTERED

## 2024-01-31 PROCEDURE — 02100Z9 BYPASS CORONARY ARTERY, ONE ARTERY FROM LEFT INTERNAL MAMMARY, OPEN APPROACH: ICD-10-PCS | Performed by: SURGERY

## 2024-01-31 PROCEDURE — 5A1221Z PERFORMANCE OF CARDIAC OUTPUT, CONTINUOUS: ICD-10-PCS | Performed by: SURGERY

## 2024-01-31 PROCEDURE — 37799 UNLISTED PX VASCULAR SURGERY: CPT

## 2024-01-31 PROCEDURE — 36620 INSERTION CATHETER ARTERY: CPT | Mod: 59,,, | Performed by: NURSE ANESTHETIST, CERTIFIED REGISTERED

## 2024-01-31 PROCEDURE — 27200966 HC CLOSED SUCTION SYSTEM

## 2024-01-31 PROCEDURE — 76937 US GUIDE VASCULAR ACCESS: CPT | Performed by: NURSE ANESTHETIST, CERTIFIED REGISTERED

## 2024-01-31 PROCEDURE — D9220A PRA ANESTHESIA: Mod: CRNA,,, | Performed by: NURSE ANESTHETIST, CERTIFIED REGISTERED

## 2024-01-31 PROCEDURE — 25000003 PHARM REV CODE 250: Performed by: SPECIALIST

## 2024-01-31 PROCEDURE — P9045 ALBUMIN (HUMAN), 5%, 250 ML: HCPCS | Mod: JG

## 2024-01-31 PROCEDURE — 80197 ASSAY OF TACROLIMUS: CPT

## 2024-01-31 PROCEDURE — 93312 ECHO TRANSESOPHAGEAL: CPT | Mod: 26,59,, | Performed by: ANESTHESIOLOGY

## 2024-01-31 PROCEDURE — C1894 INTRO/SHEATH, NON-LASER: HCPCS | Performed by: SPECIALIST

## 2024-01-31 PROCEDURE — P9045 ALBUMIN (HUMAN), 5%, 250 ML: HCPCS | Mod: JZ,JG | Performed by: PHYSICIAN ASSISTANT

## 2024-01-31 PROCEDURE — 63600175 PHARM REV CODE 636 W HCPCS: Performed by: INTERNAL MEDICINE

## 2024-01-31 PROCEDURE — 63600175 PHARM REV CODE 636 W HCPCS: Performed by: SPECIALIST

## 2024-01-31 PROCEDURE — 06BP4ZZ EXCISION OF RIGHT SAPHENOUS VEIN, PERCUTANEOUS ENDOSCOPIC APPROACH: ICD-10-PCS | Performed by: SURGERY

## 2024-01-31 PROCEDURE — 27201423 OPTIME MED/SURG SUP & DEVICES STERILE SUPPLY: Performed by: SPECIALIST

## 2024-01-31 PROCEDURE — 25000003 PHARM REV CODE 250

## 2024-01-31 PROCEDURE — 82962 GLUCOSE BLOOD TEST: CPT | Performed by: SPECIALIST

## 2024-01-31 PROCEDURE — D9220A PRA ANESTHESIA: Mod: ANES,,, | Performed by: ANESTHESIOLOGY

## 2024-01-31 PROCEDURE — 94761 N-INVAS EAR/PLS OXIMETRY MLT: CPT | Mod: XB

## 2024-01-31 PROCEDURE — C1751 CATH, INF, PER/CENT/MIDLINE: HCPCS | Performed by: SPECIALIST

## 2024-01-31 PROCEDURE — 85018 HEMOGLOBIN: CPT | Performed by: SPECIALIST

## 2024-01-31 PROCEDURE — 85025 COMPLETE CBC W/AUTO DIFF WBC: CPT | Performed by: INTERNAL MEDICINE

## 2024-01-31 PROCEDURE — 25000003 PHARM REV CODE 250: Performed by: PHYSICIAN ASSISTANT

## 2024-01-31 PROCEDURE — 86923 COMPATIBILITY TEST ELECTRIC: CPT | Mod: 91 | Performed by: SPECIALIST

## 2024-01-31 PROCEDURE — 85730 THROMBOPLASTIN TIME PARTIAL: CPT | Performed by: INTERNAL MEDICINE

## 2024-01-31 PROCEDURE — 20000000 HC ICU ROOM

## 2024-01-31 PROCEDURE — 30233N1 TRANSFUSION OF NONAUTOLOGOUS RED BLOOD CELLS INTO PERIPHERAL VEIN, PERCUTANEOUS APPROACH: ICD-10-PCS | Performed by: SURGERY

## 2024-01-31 PROCEDURE — 33533 CABG ARTERIAL SINGLE: CPT | Mod: ,,, | Performed by: SPECIALIST

## 2024-01-31 PROCEDURE — 06BQ4ZZ EXCISION OF LEFT SAPHENOUS VEIN, PERCUTANEOUS ENDOSCOPIC APPROACH: ICD-10-PCS | Performed by: SURGERY

## 2024-01-31 PROCEDURE — 99900017 HC EXTUBATION W/PARAMETERS (STAT)

## 2024-01-31 PROCEDURE — 63600175 PHARM REV CODE 636 W HCPCS

## 2024-01-31 PROCEDURE — S5010 5% DEXTROSE AND 0.45% SALINE: HCPCS | Performed by: PHYSICIAN ASSISTANT

## 2024-01-31 PROCEDURE — 27100171 HC OXYGEN HIGH FLOW UP TO 24 HOURS

## 2024-01-31 PROCEDURE — C1729 CATH, DRAINAGE: HCPCS | Performed by: SPECIALIST

## 2024-01-31 PROCEDURE — 36000713 HC OR TIME LEV V EA ADD 15 MIN: Performed by: SPECIALIST

## 2024-01-31 PROCEDURE — 94002 VENT MGMT INPAT INIT DAY: CPT

## 2024-01-31 PROCEDURE — 84132 ASSAY OF SERUM POTASSIUM: CPT | Performed by: SPECIALIST

## 2024-01-31 PROCEDURE — 93320 DOPPLER ECHO COMPLETE: CPT | Mod: 26,,, | Performed by: ANESTHESIOLOGY

## 2024-01-31 PROCEDURE — 25000003 PHARM REV CODE 250: Performed by: NURSE ANESTHETIST, CERTIFIED REGISTERED

## 2024-01-31 PROCEDURE — 85610 PROTHROMBIN TIME: CPT | Performed by: SPECIALIST

## 2024-01-31 PROCEDURE — 33533 CABG ARTERIAL SINGLE: CPT | Mod: AS,,, | Performed by: PHYSICIAN ASSISTANT

## 2024-01-31 PROCEDURE — 80048 BASIC METABOLIC PNL TOTAL CA: CPT | Performed by: SPECIALIST

## 2024-01-31 PROCEDURE — 63600175 PHARM REV CODE 636 W HCPCS: Performed by: NURSE ANESTHETIST, CERTIFIED REGISTERED

## 2024-01-31 PROCEDURE — 33518 CABG ARTERY-VEIN TWO: CPT | Mod: ,,, | Performed by: SPECIALIST

## 2024-01-31 PROCEDURE — 021109W BYPASS CORONARY ARTERY, TWO ARTERIES FROM AORTA WITH AUTOLOGOUS VENOUS TISSUE, OPEN APPROACH: ICD-10-PCS | Performed by: SURGERY

## 2024-01-31 PROCEDURE — 33508 ENDOSCOPIC VEIN HARVEST: CPT | Mod: AS,59,, | Performed by: PHYSICIAN ASSISTANT

## 2024-01-31 PROCEDURE — 36000712 HC OR TIME LEV V 1ST 15 MIN: Performed by: SPECIALIST

## 2024-01-31 PROCEDURE — 37000008 HC ANESTHESIA 1ST 15 MINUTES: Performed by: SPECIALIST

## 2024-01-31 PROCEDURE — C1887 CATHETER, GUIDING: HCPCS | Performed by: SPECIALIST

## 2024-01-31 PROCEDURE — 93325 DOPPLER ECHO COLOR FLOW MAPG: CPT | Mod: 26,,, | Performed by: ANESTHESIOLOGY

## 2024-01-31 RX ORDER — VANCOMYCIN HYDROCHLORIDE 1 G/20ML
INJECTION, POWDER, LYOPHILIZED, FOR SOLUTION INTRAVENOUS
Status: DISCONTINUED | OUTPATIENT
Start: 2024-01-31 | End: 2024-01-31 | Stop reason: HOSPADM

## 2024-01-31 RX ORDER — ONDANSETRON HYDROCHLORIDE 2 MG/ML
4 INJECTION, SOLUTION INTRAVENOUS EVERY 4 HOURS PRN
Status: DISCONTINUED | OUTPATIENT
Start: 2024-01-31 | End: 2024-02-06 | Stop reason: HOSPADM

## 2024-01-31 RX ORDER — MORPHINE SULFATE 10 MG/ML
4 INJECTION INTRAMUSCULAR; INTRAVENOUS; SUBCUTANEOUS EVERY 4 HOURS PRN
Status: DISCONTINUED | OUTPATIENT
Start: 2024-01-31 | End: 2024-02-06 | Stop reason: HOSPADM

## 2024-01-31 RX ORDER — ACETAMINOPHEN 650 MG/20.3ML
650 LIQUID ORAL EVERY 6 HOURS PRN
Status: DISCONTINUED | OUTPATIENT
Start: 2024-01-31 | End: 2024-02-06 | Stop reason: HOSPADM

## 2024-01-31 RX ORDER — OXYCODONE HYDROCHLORIDE 5 MG/1
10 TABLET ORAL EVERY 4 HOURS PRN
Status: DISCONTINUED | OUTPATIENT
Start: 2024-01-31 | End: 2024-02-03

## 2024-01-31 RX ORDER — 3% SODIUM CHLORIDE 3 G/100ML
INJECTION, SOLUTION INTRAVENOUS
Status: COMPLETED | OUTPATIENT
Start: 2024-01-31 | End: 2024-01-31

## 2024-01-31 RX ORDER — POTASSIUM CHLORIDE 14.9 MG/ML
20 INJECTION INTRAVENOUS
Status: DISCONTINUED | OUTPATIENT
Start: 2024-01-31 | End: 2024-02-06 | Stop reason: HOSPADM

## 2024-01-31 RX ORDER — ROCURONIUM BROMIDE 10 MG/ML
INJECTION, SOLUTION INTRAVENOUS
Status: DISCONTINUED | OUTPATIENT
Start: 2024-01-31 | End: 2024-01-31

## 2024-01-31 RX ORDER — METOPROLOL TARTRATE 25 MG/1
12.5 TABLET ORAL 2 TIMES DAILY
Status: DISCONTINUED | OUTPATIENT
Start: 2024-02-01 | End: 2024-02-02

## 2024-01-31 RX ORDER — NOREPINEPHRINE BITARTRATE 1 MG/ML
INJECTION, SOLUTION INTRAVENOUS
Status: DISCONTINUED | OUTPATIENT
Start: 2024-01-31 | End: 2024-01-31

## 2024-01-31 RX ORDER — LACTULOSE 10 G/15ML
20 SOLUTION ORAL EVERY 6 HOURS PRN
Status: DISCONTINUED | OUTPATIENT
Start: 2024-01-31 | End: 2024-02-06 | Stop reason: HOSPADM

## 2024-01-31 RX ORDER — FOLIC ACID 1 MG/1
1 TABLET ORAL DAILY
Status: DISCONTINUED | OUTPATIENT
Start: 2024-02-01 | End: 2024-02-06 | Stop reason: HOSPADM

## 2024-01-31 RX ORDER — MIDAZOLAM HYDROCHLORIDE 1 MG/ML
INJECTION INTRAMUSCULAR; INTRAVENOUS
Status: DISCONTINUED | OUTPATIENT
Start: 2024-01-31 | End: 2024-01-31

## 2024-01-31 RX ORDER — CALCIUM CHLORIDE INJECTION 100 MG/ML
INJECTION, SOLUTION INTRAVENOUS
Status: DISCONTINUED | OUTPATIENT
Start: 2024-01-31 | End: 2024-01-31 | Stop reason: HOSPADM

## 2024-01-31 RX ORDER — PROTAMINE SULFATE 10 MG/ML
INJECTION, SOLUTION INTRAVENOUS
Status: DISCONTINUED | OUTPATIENT
Start: 2024-01-31 | End: 2024-01-31

## 2024-01-31 RX ORDER — ESMOLOL HYDROCHLORIDE 10 MG/ML
INJECTION INTRAVENOUS
Status: DISCONTINUED | OUTPATIENT
Start: 2024-01-31 | End: 2024-01-31

## 2024-01-31 RX ORDER — HEPARIN SODIUM 1000 [USP'U]/ML
INJECTION, SOLUTION INTRAVENOUS; SUBCUTANEOUS
Status: DISCONTINUED | OUTPATIENT
Start: 2024-01-31 | End: 2024-01-31 | Stop reason: HOSPADM

## 2024-01-31 RX ORDER — TRANEXAMIC ACID 100 MG/ML
INJECTION, SOLUTION INTRAVENOUS
Status: DISCONTINUED | OUTPATIENT
Start: 2024-01-31 | End: 2024-01-31

## 2024-01-31 RX ORDER — CALCIUM CHLORIDE INJECTION 100 MG/ML
INJECTION, SOLUTION INTRAVENOUS
Status: DISCONTINUED | OUTPATIENT
Start: 2024-01-31 | End: 2024-01-31

## 2024-01-31 RX ORDER — KETOROLAC TROMETHAMINE 30 MG/ML
30 INJECTION, SOLUTION INTRAMUSCULAR; INTRAVENOUS EVERY 6 HOURS PRN
Status: DISPENSED | OUTPATIENT
Start: 2024-01-31 | End: 2024-02-03

## 2024-01-31 RX ORDER — TACROLIMUS 1 MG/1
1 CAPSULE ORAL EVERY MORNING
Status: DISCONTINUED | OUTPATIENT
Start: 2024-01-31 | End: 2024-01-31

## 2024-01-31 RX ORDER — ASPIRIN 81 MG/1
81 TABLET ORAL DAILY
Status: DISCONTINUED | OUTPATIENT
Start: 2024-02-01 | End: 2024-02-06 | Stop reason: HOSPADM

## 2024-01-31 RX ORDER — DEXTROSE MONOHYDRATE AND SODIUM CHLORIDE 5; .45 G/100ML; G/100ML
INJECTION, SOLUTION INTRAVENOUS CONTINUOUS
Status: DISCONTINUED | OUTPATIENT
Start: 2024-01-31 | End: 2024-02-01

## 2024-01-31 RX ORDER — DOCUSATE SODIUM 100 MG/1
100 CAPSULE, LIQUID FILLED ORAL 2 TIMES DAILY
Status: DISCONTINUED | OUTPATIENT
Start: 2024-02-01 | End: 2024-02-06 | Stop reason: HOSPADM

## 2024-01-31 RX ORDER — CALCIUM GLUCONATE 20 MG/ML
1 INJECTION, SOLUTION INTRAVENOUS
Status: DISCONTINUED | OUTPATIENT
Start: 2024-01-31 | End: 2024-02-06 | Stop reason: HOSPADM

## 2024-01-31 RX ORDER — CALCIUM GLUCONATE 20 MG/ML
3 INJECTION, SOLUTION INTRAVENOUS
Status: DISCONTINUED | OUTPATIENT
Start: 2024-01-31 | End: 2024-02-06 | Stop reason: HOSPADM

## 2024-01-31 RX ORDER — LOPERAMIDE HYDROCHLORIDE 2 MG/1
2 CAPSULE ORAL CONTINUOUS PRN
Status: DISCONTINUED | OUTPATIENT
Start: 2024-01-31 | End: 2024-02-06 | Stop reason: HOSPADM

## 2024-01-31 RX ORDER — ALBUMIN HUMAN 50 G/1000ML
12.5 SOLUTION INTRAVENOUS
Status: DISCONTINUED | OUTPATIENT
Start: 2024-01-31 | End: 2024-02-06 | Stop reason: HOSPADM

## 2024-01-31 RX ORDER — PAPAVERINE HYDROCHLORIDE 30 MG/ML
INJECTION INTRAMUSCULAR; INTRAVENOUS
Status: DISCONTINUED | OUTPATIENT
Start: 2024-01-31 | End: 2024-01-31 | Stop reason: HOSPADM

## 2024-01-31 RX ORDER — LIDOCAINE HYDROCHLORIDE 20 MG/ML
INJECTION, SOLUTION EPIDURAL; INFILTRATION; INTRACAUDAL; PERINEURAL
Status: DISCONTINUED | OUTPATIENT
Start: 2024-01-31 | End: 2024-01-31

## 2024-01-31 RX ORDER — MAGNESIUM SULFATE HEPTAHYDRATE 40 MG/ML
2 INJECTION, SOLUTION INTRAVENOUS
Status: DISCONTINUED | OUTPATIENT
Start: 2024-01-31 | End: 2024-02-06 | Stop reason: HOSPADM

## 2024-01-31 RX ORDER — POTASSIUM CHLORIDE 14.9 MG/ML
60 INJECTION INTRAVENOUS
Status: DISCONTINUED | OUTPATIENT
Start: 2024-01-31 | End: 2024-02-06 | Stop reason: HOSPADM

## 2024-01-31 RX ORDER — PHENYLEPHRINE HYDROCHLORIDE 10 MG/ML
INJECTION INTRAVENOUS
Status: DISCONTINUED | OUTPATIENT
Start: 2024-01-31 | End: 2024-01-31

## 2024-01-31 RX ORDER — SUCRALFATE 1 G/1
1 TABLET ORAL
Status: DISCONTINUED | OUTPATIENT
Start: 2024-02-01 | End: 2024-02-06 | Stop reason: HOSPADM

## 2024-01-31 RX ORDER — HYDROCODONE BITARTRATE AND ACETAMINOPHEN 500; 5 MG/1; MG/1
TABLET ORAL
Status: DISCONTINUED | OUTPATIENT
Start: 2024-01-31 | End: 2024-01-31 | Stop reason: HOSPADM

## 2024-01-31 RX ORDER — FENTANYL CITRATE 50 UG/ML
INJECTION, SOLUTION INTRAMUSCULAR; INTRAVENOUS
Status: DISCONTINUED | OUTPATIENT
Start: 2024-01-31 | End: 2024-01-31

## 2024-01-31 RX ORDER — FAMOTIDINE 10 MG/ML
20 INJECTION INTRAVENOUS DAILY
Status: DISCONTINUED | OUTPATIENT
Start: 2024-01-31 | End: 2024-02-01

## 2024-01-31 RX ORDER — HEPARIN SODIUM 1000 [USP'U]/ML
INJECTION, SOLUTION INTRAVENOUS; SUBCUTANEOUS
Status: DISCONTINUED | OUTPATIENT
Start: 2024-01-31 | End: 2024-01-31

## 2024-01-31 RX ORDER — MAGNESIUM SULFATE HEPTAHYDRATE 40 MG/ML
4 INJECTION, SOLUTION INTRAVENOUS
Status: DISCONTINUED | OUTPATIENT
Start: 2024-01-31 | End: 2024-02-06 | Stop reason: HOSPADM

## 2024-01-31 RX ORDER — POTASSIUM CHLORIDE 14.9 MG/ML
40 INJECTION INTRAVENOUS
Status: DISCONTINUED | OUTPATIENT
Start: 2024-01-31 | End: 2024-02-06 | Stop reason: HOSPADM

## 2024-01-31 RX ORDER — CALCIUM GLUCONATE 20 MG/ML
2 INJECTION, SOLUTION INTRAVENOUS
Status: DISCONTINUED | OUTPATIENT
Start: 2024-01-31 | End: 2024-02-06 | Stop reason: HOSPADM

## 2024-01-31 RX ORDER — CEFAZOLIN SODIUM 2 G/50ML
2 SOLUTION INTRAVENOUS
Status: COMPLETED | OUTPATIENT
Start: 2024-01-31 | End: 2024-02-01

## 2024-01-31 RX ORDER — METOCLOPRAMIDE HYDROCHLORIDE 5 MG/ML
5 INJECTION INTRAMUSCULAR; INTRAVENOUS EVERY 6 HOURS PRN
Status: DISCONTINUED | OUTPATIENT
Start: 2024-01-31 | End: 2024-02-06 | Stop reason: HOSPADM

## 2024-01-31 RX ORDER — ENOXAPARIN SODIUM 100 MG/ML
40 INJECTION SUBCUTANEOUS EVERY 24 HOURS
Status: DISCONTINUED | OUTPATIENT
Start: 2024-02-01 | End: 2024-02-05

## 2024-01-31 RX ORDER — EPHEDRINE SULFATE 50 MG/ML
INJECTION, SOLUTION INTRAVENOUS
Status: DISCONTINUED | OUTPATIENT
Start: 2024-01-31 | End: 2024-01-31

## 2024-01-31 RX ORDER — TACROLIMUS 0.5 MG/1
0.5 CAPSULE ORAL EVERY EVENING
Status: DISCONTINUED | OUTPATIENT
Start: 2024-01-31 | End: 2024-02-06 | Stop reason: HOSPADM

## 2024-01-31 RX ORDER — DEXMEDETOMIDINE HYDROCHLORIDE 4 UG/ML
0-1.4 INJECTION, SOLUTION INTRAVENOUS CONTINUOUS
Status: DISCONTINUED | OUTPATIENT
Start: 2024-01-31 | End: 2024-01-31

## 2024-01-31 RX ORDER — MUPIROCIN 20 MG/G
OINTMENT TOPICAL
Status: COMPLETED | OUTPATIENT
Start: 2024-01-31 | End: 2024-01-31

## 2024-01-31 RX ORDER — HEPARIN 100 UNIT/ML
5 SYRINGE INTRAVENOUS
Status: DISCONTINUED | OUTPATIENT
Start: 2024-01-31 | End: 2024-01-31 | Stop reason: HOSPADM

## 2024-01-31 RX ORDER — MUPIROCIN 20 MG/G
OINTMENT TOPICAL 2 TIMES DAILY
Status: DISPENSED | OUTPATIENT
Start: 2024-01-31 | End: 2024-02-05

## 2024-01-31 RX ORDER — TACROLIMUS 1 MG/1
1 CAPSULE ORAL EVERY MORNING
Status: DISCONTINUED | OUTPATIENT
Start: 2024-01-31 | End: 2024-02-06 | Stop reason: HOSPADM

## 2024-01-31 RX ORDER — HYDROCODONE BITARTRATE AND ACETAMINOPHEN 5; 325 MG/1; MG/1
1 TABLET ORAL EVERY 4 HOURS PRN
Status: DISCONTINUED | OUTPATIENT
Start: 2024-01-31 | End: 2024-02-06 | Stop reason: HOSPADM

## 2024-01-31 RX ORDER — PROPOFOL 10 MG/ML
VIAL (ML) INTRAVENOUS
Status: DISCONTINUED | OUTPATIENT
Start: 2024-01-31 | End: 2024-01-31

## 2024-01-31 RX ORDER — ACETAMINOPHEN 10 MG/ML
1000 INJECTION, SOLUTION INTRAVENOUS EVERY 8 HOURS
Status: DISCONTINUED | OUTPATIENT
Start: 2024-01-31 | End: 2024-01-31

## 2024-01-31 RX ORDER — ETOMIDATE 2 MG/ML
INJECTION INTRAVENOUS
Status: DISCONTINUED | OUTPATIENT
Start: 2024-01-31 | End: 2024-01-31

## 2024-01-31 RX ADMIN — FENTANYL CITRATE 200 MCG: 50 INJECTION, SOLUTION INTRAMUSCULAR; INTRAVENOUS at 06:01

## 2024-01-31 RX ADMIN — MUPIROCIN: 20 OINTMENT TOPICAL at 08:01

## 2024-01-31 RX ADMIN — ROCURONIUM BROMIDE 70 MG: 10 SOLUTION INTRAVENOUS at 06:01

## 2024-01-31 RX ADMIN — ROCURONIUM BROMIDE 30 MG: 10 SOLUTION INTRAVENOUS at 07:01

## 2024-01-31 RX ADMIN — CALCIUM CHLORIDE INJECTION 500 MG: 100 INJECTION, SOLUTION INTRAVENOUS at 09:01

## 2024-01-31 RX ADMIN — HYDROCODONE BITARTRATE AND ACETAMINOPHEN 1 TABLET: 5; 325 TABLET ORAL at 10:01

## 2024-01-31 RX ADMIN — DEXTROSE AND SODIUM CHLORIDE: 5; 450 INJECTION, SOLUTION INTRAVENOUS at 10:01

## 2024-01-31 RX ADMIN — MORPHINE SULFATE 4 MG: 10 INJECTION, SOLUTION INTRAMUSCULAR; INTRAVENOUS at 10:01

## 2024-01-31 RX ADMIN — DEXTROSE MONOHYDRATE 1.5 G: 5 INJECTION INTRAVENOUS at 07:01

## 2024-01-31 RX ADMIN — EPHEDRINE SULFATE 15 MG: 50 INJECTION INTRAVENOUS at 08:01

## 2024-01-31 RX ADMIN — EPINEPHRINE 0.02 MCG/KG/MIN: 1 INJECTION INTRAMUSCULAR; INTRAVENOUS; SUBCUTANEOUS at 09:01

## 2024-01-31 RX ADMIN — FENTANYL CITRATE 200 MCG: 50 INJECTION, SOLUTION INTRAMUSCULAR; INTRAVENOUS at 10:01

## 2024-01-31 RX ADMIN — MIDAZOLAM HYDROCHLORIDE 4 MG: 1 INJECTION, SOLUTION INTRAMUSCULAR; INTRAVENOUS at 06:01

## 2024-01-31 RX ADMIN — KETOROLAC TROMETHAMINE 30 MG: 30 INJECTION INTRAMUSCULAR; INTRAVENOUS at 05:01

## 2024-01-31 RX ADMIN — TACROLIMUS 1 MG: 1 CAPSULE ORAL at 11:01

## 2024-01-31 RX ADMIN — ALBUMIN (HUMAN) 12.5 G: 2.5 SOLUTION INTRAVENOUS at 12:01

## 2024-01-31 RX ADMIN — PHENYLEPHRINE HYDROCHLORIDE 100 MCG: 10 INJECTION INTRAVENOUS at 09:01

## 2024-01-31 RX ADMIN — SODIUM CHLORIDE 2 UNITS/HR: 9 INJECTION, SOLUTION INTRAVENOUS at 08:01

## 2024-01-31 RX ADMIN — ESMOLOL HYDROCHLORIDE 5 MG: 100 INJECTION, SOLUTION INTRAVENOUS at 06:01

## 2024-01-31 RX ADMIN — HEPARIN SODIUM 30000 UNITS: 1000 INJECTION, SOLUTION INTRAVENOUS; SUBCUTANEOUS at 08:01

## 2024-01-31 RX ADMIN — ONDANSETRON 4 MG: 2 INJECTION INTRAMUSCULAR; INTRAVENOUS at 03:01

## 2024-01-31 RX ADMIN — KETOROLAC TROMETHAMINE 30 MG: 30 INJECTION INTRAMUSCULAR; INTRAVENOUS at 11:01

## 2024-01-31 RX ADMIN — OXYCODONE HYDROCHLORIDE 10 MG: 5 TABLET ORAL at 08:01

## 2024-01-31 RX ADMIN — LIDOCAINE HYDROCHLORIDE 4 ML: 20 INJECTION, SOLUTION EPIDURAL; INFILTRATION; INTRACAUDAL; PERINEURAL at 06:01

## 2024-01-31 RX ADMIN — EPHEDRINE SULFATE 15 MG: 50 INJECTION INTRAVENOUS at 07:01

## 2024-01-31 RX ADMIN — FENTANYL CITRATE 100 MCG: 50 INJECTION, SOLUTION INTRAMUSCULAR; INTRAVENOUS at 07:01

## 2024-01-31 RX ADMIN — PROTAMINE SULFATE 350 MG: 10 INJECTION, SOLUTION INTRAVENOUS at 09:01

## 2024-01-31 RX ADMIN — ACETAMINOPHEN 1000 MG: 10 INJECTION, SOLUTION INTRAVENOUS at 11:01

## 2024-01-31 RX ADMIN — TRANEXAMIC ACID 1000 MG: 100 INJECTION, SOLUTION INTRAVENOUS at 06:01

## 2024-01-31 RX ADMIN — ROCURONIUM BROMIDE 30 MG: 10 SOLUTION INTRAVENOUS at 09:01

## 2024-01-31 RX ADMIN — EPHEDRINE SULFATE 10 MG: 50 INJECTION INTRAVENOUS at 07:01

## 2024-01-31 RX ADMIN — ONDANSETRON 4 MG: 2 INJECTION INTRAMUSCULAR; INTRAVENOUS at 08:01

## 2024-01-31 RX ADMIN — ETOMIDATE 20 MG: 2 INJECTION INTRAVENOUS at 06:01

## 2024-01-31 RX ADMIN — CEFAZOLIN SODIUM 2 G: 2 SOLUTION INTRAVENOUS at 08:01

## 2024-01-31 RX ADMIN — TACROLIMUS 0.5 MG: 0.5 CAPSULE ORAL at 08:01

## 2024-01-31 RX ADMIN — EPHEDRINE SULFATE 10 MG: 50 INJECTION INTRAVENOUS at 08:01

## 2024-01-31 RX ADMIN — DEXMEDETOMIDINE HYDROCHLORIDE 0.5 MCG/KG/HR: 400 INJECTION INTRAVENOUS at 09:01

## 2024-01-31 RX ADMIN — MUPIROCIN: 20 OINTMENT TOPICAL at 05:01

## 2024-01-31 RX ADMIN — PROPOFOL 50 MG: 10 INJECTION, EMULSION INTRAVENOUS at 06:01

## 2024-01-31 RX ADMIN — OXYCODONE HYDROCHLORIDE 10 MG: 5 TABLET ORAL at 04:01

## 2024-01-31 RX ADMIN — TRANEXAMIC ACID 1000 MG: 100 INJECTION, SOLUTION INTRAVENOUS at 09:01

## 2024-01-31 RX ADMIN — DEXTROSE AND SODIUM CHLORIDE: 5; 450 INJECTION, SOLUTION INTRAVENOUS at 08:01

## 2024-01-31 RX ADMIN — SODIUM CHLORIDE: 9 INJECTION, SOLUTION INTRAVENOUS at 06:01

## 2024-01-31 RX ADMIN — OXYCODONE HYDROCHLORIDE 10 MG: 5 TABLET ORAL at 12:01

## 2024-01-31 NOTE — ANESTHESIA PREPROCEDURE EVALUATION
01/31/2024  Radu Rock is a 64 y.o., male.    65 yo male with known CADz found to have triple-vessel disease at cath. Patient S/P Liver transplant for chronic Hep C and hepatocellular carcinoma.    1/22/24 Cath:  Findings:  1. Left main-normal  2. Lad-60-70% lad mid ISR involving large diagonal, IFR positive, 0.82 in LAD  3. LCX-non dominant, 80% proximal lesion extending into OM2, OM1 70% ostial lesion  4. RCA-dominant, 60% proximal lesion, 80% ostial PDA  5. LIMA patent    Pre-op Assessment    I have reviewed the Patient Summary Reports.     I have reviewed the Nursing Notes. I have reviewed the NPO Status.   I have reviewed the Medications.     Review of Systems  Cardiovascular:     Hypertension  Past MI CAD   CABG/stent (Stent)   Angina     hyperlipidemia                             Pulmonary:        Sleep Apnea, CPAP                Hepatic/GI:      Liver Disease, Hepatitis, C S/P Liver transplant          Psych:    depression                Physical Exam  General: Cooperative, Alert and Oriented    Airway:  Mallampati: II   Mouth Opening: Normal  TM Distance: Normal  Tongue: Normal  Neck ROM: Normal ROM    Dental:  Intact        Anesthesia Plan  Type of Anesthesia, risks & benefits discussed:    Anesthesia Type: Gen ETT  Intra-op Monitoring Plan: Standard ASA Monitors, Art Line, Central Line and LALITA  Post Op Pain Control Plan: multimodal analgesia  Induction:  IV  Airway Plan: Video  Informed Consent: Informed consent signed with the Patient and all parties understand the risks and agree with anesthesia plan.  All questions answered. Patient consented to blood products? Yes  ASA Score: 3  Day of Surgery Review of History & Physical: H&P Update referred to the surgeon/provider.H&P completed by Anesthesiologist.    Ready For Surgery From Anesthesia Perspective.     .

## 2024-01-31 NOTE — ANESTHESIA PROCEDURE NOTES
Arterial    Diagnosis: Multivessel CAD    Patient location during procedure: holding area  Timeout: 1/31/2024 6:35 AM  Procedure end time: 1/31/2024 6:37 AM    Staffing  Authorizing Provider: Brennon Jeong MD  Performing Provider: Ricky Patino CRNA    Staffing  Performed by: Ricky Patino CRNA  Authorized by: Brennon Jeong MD    Anesthesiologist was present at the time of the procedure.    Preanesthetic Checklist  Completed: patient identified, IV checked, site marked, risks and benefits discussed, surgical consent, monitors and equipment checked, pre-op evaluation, timeout performed and anesthesia consent givenArterial  Skin Prep: chlorhexidine gluconate and isopropyl alcohol  Local Infiltration: lidocaine  Orientation: left  Location: radial    Catheter Size: 20 G  Catheter placement by Ultrasound guidance. Heme positive aspiration all ports.   Vessel Caliber: small, patent, compressibility normal  Vascular Doppler:  not done  Needle advanced into vessel with real time Ultrasound guidance.  Guidewire confirmed in vessel.  Sterile sheath used.  Image recorded and saved.Insertion Attempts: 1  Assessment  Dressing: secured with tape and tegaderm  Patient: Tolerated well

## 2024-01-31 NOTE — ANESTHESIA PROCEDURE NOTES
Intubation    Date/Time: 1/31/2024 6:50 AM    Performed by: Ricky Patino CRNA  Authorized by: Brennon Jeong MD    Intubation:     Induction:  Intravenous    Intubated:  Postinduction    Mask Ventilation:  Easy with oral airway    Attempts:  1    Attempted By:  CRNA    Method of Intubation:  Direct    Blade:  Kuhn 3    Laryngeal View Grade: Grade IIA - cords partially seen      Difficult Airway Encountered?: No      Complications:  None    Airway Device:  Oral endotracheal tube    Airway Device Size:  8.0    Style/Cuff Inflation:  Cuffed (inflated to minimal occlusive pressure)    Inflation Amount (mL):  6    Tube secured:  21    Secured at:  The lips    Placement Verified By:  Capnometry    Complicating Factors:  None    Findings Post-Intubation:  BS equal bilateral and atraumatic/condition of teeth unchanged

## 2024-01-31 NOTE — NURSING
Nurses Note -- 4 Eyes      1/31/2024   10:27 AM      Skin assessed during: Admit      [x] No Altered Skin Integrity Present    [x]Prevention Measures Documented      [] Yes- Altered Skin Integrity Present or Discovered   [] LDA Added if Not in Epic (Describe Wound)   [] New Altered Skin Integrity was Present on Admit and Documented in LDA   [] Wound Image Taken    Wound Care Consulted? No    Attending Nurse:   Cassie FLOWER RN     Second RN/Staff Member:   Melody KESSLER RN

## 2024-01-31 NOTE — ANESTHESIA POSTPROCEDURE EVALUATION
Anesthesia Post Evaluation    Patient: Radu Rock    Procedure(s) Performed: Procedure(s) (LRB):  CORONARY ARTERY BYPASS GRAFT (CABG) (N/A)  SURGICAL PROCUREMENT, VEIN, ENDOSCOPIC (Bilateral)    Final Anesthesia Type: general      Patient location during evaluation: ICU  Patient participation: No - Unable to Participate, Intubation  Level of consciousness: sedated  Post-procedure vital signs: reviewed and stable  Pain management: adequate  Airway patency: patent  TAY mitigation strategies: Extubation while patient is awake  PONV status at discharge: No PONV  Anesthetic complications: no      Cardiovascular status: blood pressure returned to baseline  Respiratory status: ventilator and intubated                Vitals Value Taken Time   BP 96/63 01/31/24 1131   Temp 97.6 01/31/24 1134   Pulse 68 01/31/24 1133   Resp 0 01/31/24 1133   SpO2 99 % 01/31/24 1133   Vitals shown include unvalidated device data.      No case tracking events are documented in the log.      Pain/Arnulfo Score: Pain Rating Prior to Med Admin: 7 (1/31/2024 11:20 AM)

## 2024-01-31 NOTE — ANESTHESIA PROCEDURE NOTES
LALITA    Diagnosis: Coronary artery disease  Patient location during procedure: OR  Timeout: 1/31/2024 7:12 AM  Procedure end time: 1/31/2024 7:23 AM  Surgery related to: CABG  Exam type: Baseline    Staffing  Performed: anesthesiologist     Anesthesiologist: Brennon Jeong MD        Anesthesiologist Present  Yes      Setup & Induction  Patient preparation: bite block inserted  Probe Insertion: easy  Exam: limitedDoppler Echo: 2D and color flow mapping.  Exam     Left Heart  Left Atruim: normal and normal (men 3.0-4.0; women 2.7-3.8)    Left Ventricle: cm, normal (men 4.2-5.9; women 3.8-5.2)  LV Wall Thickness (posterior wall):cm, normal (men 0.6-1.0 cm; women 0.6-0.9 cm)    LVAD  Estimated Ejection Fraction: > 55% normal  Regional Wall Abnormalities: no RWMA            Right Heart  Right Ventricle: normal  Right Ventricle Function: normal  Right Atria:  normal    Intra Atrial Septum  PFO: no shunt by color flow doppler          Right Ventricle  Size: normal, Free Wall Thickness: normal    Aortic Valve:  Stenosis: none.  Morphology: trileaflet    Regurgitation: no aortic valve regurgitation      Mitral Valve:   Morphology:normal  Jet Description: mild and centrally-directed    Tricuspid Valve:  Morphology: normal  Regurgitation: mild    Pulmonic Valve:  Morphology:normal  Regurgitation(color flow): none    Great Vessels  Ascending Aorta Diameter: 3.5 cm       Effusions none    SummaryFindings discussed with surgeon.    Other Findings

## 2024-01-31 NOTE — TRANSFER OF CARE
"Anesthesia Transfer of Care Note    Patient: Radu Rock    Procedure(s) Performed: Procedure(s) (LRB):  CORONARY ARTERY BYPASS GRAFT (CABG) (N/A)  SURGICAL PROCUREMENT, VEIN, ENDOSCOPIC (Bilateral)    Patient location: ICU    Transport from OR: Transported from OR intubated on 100% O2 by AMBU with adequate controlled ventilation. Continuos invasive BP monitoring in transport    Post pain: other: Intubated and sedated    Post assessment: no apparent anesthetic complications    Post vital signs: stable    Level of consciousness: sedated    Nausea/Vomiting: no nausea/vomiting    Complications: none    Transfer of care protocol was followedComments: Patient to bed with transport monitors. Ambu ventilation to ICU. Upon arrival, patient to vent per respiratory, VSS    Last vitals: Visit Vitals  BP (!) 88/56   Pulse 64   Temp 37.1 °C (98.7 °F) (Oral)   Resp 19   Ht 5' 10" (1.778 m)   Wt 100.8 kg (222 lb 3.6 oz)   SpO2 100%   BMI 31.89 kg/m²     "

## 2024-01-31 NOTE — OP NOTE
Date of service 01/31/2024   Preop diagnosis:  Coronary artery disease  Postop diagnosis:  Same  Procedure:  CABG x3; lima to the LAD; reverse saphenous vein to the obtuse marginal; reverse saphenous vein to the posterior descending artery; bilateral endoscopic vein harvest of the greater saphenous veins  Surgeon: Ghassan  Assistant: Arturo  Anesthesia:  General endotracheal with cardiopulmonary bypass  Drains: Chest tube x1    Procedure in detail:  The patient was taken to the operating room under informed consent placed on table in supine position.  General endotracheal anesthesia was induced by the anesthesia department.  He was prepped and draped in usual sterile fashion from the chin to the toes.  Incision made at the left knee left greater saphenous vein exposed and harvested from the thigh with an endoscopic vein harvester.  It was gently dilated and it was only of adequate caliber for 1 segment and then the vein got quite small.  As such incision made the right knee the right greater saphenous vein exposed and this was a nice caliber and quality was harvested from the thigh with an endoscopic vein harvester gently dilated and all branches ligated.  These wounds closed with 2-0 Vicryl the skin with a 3-0 subcuticular stitch.  Incision made in the chest from the sternal notch to the xiphoid and carried down sharply to the sternum.  The sternum transected and the left internal mammary artery harvested along with the surrounding soft tissue pedicle.  Pericardium was incised and the patient fully heparinized.  Aortic and venous cannulae were placed in the patient placed on bypass and cooled to 32° systemic.  The aorta was crossclamped and cold blood cardioplegia infused in an antegrade fashion through the aortic root.  The 1st obtuse marginal was dissected and entered and this was a 2.5 mm vessel.  A reversed saphenous vein was anastomosed in an end-to-side fashion with a running 7 0 Prolene suture.  There was  excellent flow through this vessel after completion of the anastomosis and the to reach the aorta and transected.  The posterior descending artery was dissected and entered.  This was a 2 mm vessel.  A reverse saphenous vein was anastomosed in an end-to-side fashion with a running 7 0 Prolene suture.  There was excellent flow through this vessel after completion of the anastomosis and the vein measured to reach the aorta and transected.  The left anterior descending was dissected and entered.  This was a 2 point 2 5 mm vessel.  The left internal mammary artery was anastomosed in an end-to-side fashion with a running 7 0 Prolene suture.  Was brisk flow through the distal distribution of this vessel after completion of the anastomosis.  The pedicle sutured to the epicardium with an interrupted 5 0 Prolene suture.  Crossclamp removed the patient rewarmed and an aortic exclusion clamp placed.  Two aortotomies made with an 11 blade and enlarged with a 4.8 mm punch.  Each proximal venous anastomosis performed in an end-to-side fashion with a running 5 0 Prolene suture.  Vein placed in the ostium of each vein graft.  Exclusion clamp removed and the patient spontaneously returned to sinus rhythm and subsequently was easily weaned from cardiopulmonary bypass.  Protamine was given to reverse the heparin and the arterial and venous cannulae removed and their pursestring sutures secured.  The wound copiously irrigated with a 3% saline antibiotic solution concentrated aggregated growth factor was infused throughout the wound and sternum.  A mediastinal tube was placed in the anterior and posterior space and brought out through separate stab wound and secured to the skin.  Platelet concentrated aggregated growth factor was infused throughout the wound and sternum.  The sternum closed with sternal wires.  The linea alba and sternal fascia closed running 1. Vicryl.  The subcutaneous tissue with 2-0 Vicryl the skin with a 3-0  subcuticular stitch.  The patient tolerated this procedure well and left the operating room in stable condition.

## 2024-01-31 NOTE — H&P
[facility] - [facility]  Pulmonary Critical Care Note    Patient Name: Radu Rock  MRN: [id number]  Admission Date: 1/31/2024  Hospital Length of Stay: 0 days  Code Status: Full Code  Attending Provider: Josh Solano MD  Primary Care Provider: Mariella Huntley MD     Subjective:     HPI:   This is a 54-year-old male with a history of CAD with PCI, hypertension, hyperlipidemia, TAY with CPAP, hepatitis C with cirrhosis status post liver transplant.  He recently had an abnormal PET and underwent left heart catheterization with Dr. Carlos on 1/22/2024 which revealed multivessel coronary artery disease and CT surgery was consulted.  He presented on 01/31/2024 for a CABG x3 with LIMA to the LAD, SVG to the OM and PDA with placement of 1 chest tube. Received 820 of cell saver, no blood products.  He was transferred postoperatively to the intensive care unit, intubated on mechanical ventilation, requiring vasopressor support with low dose epi.    Hospital Course/Significant events:  01/31/2024 - CABG x3    24 Hour Interval History:  N/A    Past Medical History:   Diagnosis Date    CAD (coronary artery disease)     Cirrhosis 2016    liver transplant    Coronary artery disease of native artery of native heart with stable angina pectoris     Depression     Fatigue     H/O liver cancer     HCV (hepatitis C virus)     liver transplant    Heart attack 01/2023    Hyperlipidemia     Hypertension     Obesity     Osteoporosis     Sleep apnea     non use of equipment       Past Surgical History:   Procedure Laterality Date    APPENDECTOMY      CHOLECYSTECTOMY      laprascopic    COLONOSCOPY      CORONARY STENT PLACEMENT      FRACTURE SURGERY Left     LEG    LEFT HEART CATHETERIZATION N/A 01/22/2024    Procedure: Left heart cath;  Surgeon: Frank Carlos MD;  Location: [facility] CATH LAB;  Service: Cardiology;  Laterality: N/A;    LIVER TRANSPLANT  2016    SHOULDER SURGERY Right     UPPER GASTROINTESTINAL  ENDOSCOPY         Social History     Socioeconomic History    Marital status:    Tobacco Use    Smoking status: Never    Smokeless tobacco: Never   Substance and Sexual Activity    Alcohol use: Not Currently    Drug use: No           Current Outpatient Medications   Medication Instructions    alendronate (FOSAMAX) 35 mg, Oral, Every 7 days, Mondays    amLODIPine (NORVASC) 5 mg, Oral, Nightly    aspirin (ECOTRIN) 81 mg, Oral, Once    atorvastatin (LIPITOR) 20 mg, Oral, Nightly    cholecalciferol (vitamin D3) 50,000 Units, Oral, Every 7 days, Monday    clopidogreL (PLAVIX) 75 mg, Oral, Daily    EScitalopram oxalate (LEXAPRO) 10 mg, Oral, Every morning    magnesium oxide (MAG-OX) 400 mg, Oral, 2 times daily    metoprolol tartrate (LOPRESSOR) 25 mg, Oral, 2 times daily    olmesartan (BENICAR) 20 mg, Oral, Daily    rifAXIMin (XIFAXAN) 550 mg, Oral, 2 times daily    tacrolimus (PROGRAF) 1 mg, Oral, 1 mg in the am and 0.5mg in the pm        Current Inpatient Medications   [START ON 2/1/2024] aspirin  81 mg Oral Daily    ceFAZolin (Ancef) IV (PEDS and ADULTS)  2 g Intravenous Q12H    [START ON 2/1/2024] docusate sodium  100 mg Oral BID    [START ON 2/1/2024] enoxparin  40 mg Subcutaneous Daily    famotidine (PF)  20 mg Intravenous Daily    [START ON 2/1/2024] folic acid  1 mg Oral Daily    [START ON 2/1/2024] metoprolol tartrate  12.5 mg Oral BID    mupirocin   Nasal BID    [START ON 2/1/2024] sucralfate  1 g Oral QID (AC & HS)       Current Intravenous Infusions   dexmedeTOMIDine (Precedex) infusion (titrating) Stopped (01/31/24 0945)    dextrose 5 % and 0.45 % NaCl      EPINEPHrine      insulin regular 1 units/mL infusion orderable (CTS POST-OP)      loperamide           Review of Systems   Unable to perform ROS: Intubated          Objective:       Intake/Output Summary (Last 24 hours) at 1/31/2024 1038  Last data filed at 1/31/2024 1025  Gross per 24 hour   Intake 850 ml   Output 500 ml   Net 350 ml         Vital  Signs (Most Recent):  Temp: 98.7 °F (37.1 °C) (01/31/24 0526)  Pulse: 64 (01/31/24 0629)  Resp: 19 (01/31/24 0526)  BP: 123/79 (01/31/24 0629)  SpO2: 100 % (01/31/24 0629)  Body mass index is 31.89 kg/m².  Weight: 100.8 kg (222 lb 3.6 oz) Vital Signs (24h Range):  Temp:  [98.7 °F (37.1 °C)] 98.7 °F (37.1 °C)  Pulse:  [61-64] 64  Resp:  [19] 19  SpO2:  [99 %-100 %] 100 %  BP: (117-125)/(71-79) 123/79     Physical Exam  Constitutional:       Appearance: He is obese.      Interventions: He is sedated and intubated.   HENT:      Head: Normocephalic and atraumatic.   Cardiovascular:      Rate and Rhythm: Normal rate and regular rhythm. Frequent Extrasystoles are present.  Pulmonary:      Effort: He is intubated.      Comments: Coarse breath sounds bilaterally  Chest:      Comments: Midline surgical incision with island dressing, mediastinal drain x1  Abdominal:      General: Abdomen is protuberant. Bowel sounds are decreased.      Palpations: Abdomen is soft.   Neurological:      Comments: Remains under the effects of anesthesia           Lines/Drains/Airways       Drain  Duration                  Chest Tube 01/31/24 0933 Mediastinal 24 Fr. <1 day         Urethral Catheter 01/31/24 0650 Silicone 16 Fr. <1 day              Airway  Duration                  Airway - Non-Surgical 01/31/24 0650 <1 day              Arterial Line  Duration             Arterial Line 01/31/24 0636 Left Radial <1 day              Peripheral Intravenous Line  Duration                  Peripheral IV - Single Lumen 01/31/24 0540 18 G Anterior;Distal;Left Forearm <1 day                    Significant Labs:    Lab Results   Component Value Date    WBC 7.79 01/22/2024    HGB 13.9 (L) 01/22/2024    HCT 27 (L) 01/31/2024    MCV 91.2 01/22/2024     01/22/2024           BMP  Lab Results   Component Value Date     01/31/2024    K 4.0 01/31/2024    CHLORIDE 114 (H) 01/31/2024    CO2 21 (L) 01/31/2024    BUN 18.7 01/31/2024    CREATININE 1.13  01/31/2024    CALCIUM 9.5 01/31/2024    AGAP 10.0 01/31/2024    ESTGFRAFRICA >60.0 09/25/2014    EGFRNONAA >60.0 09/25/2014         ABG  Recent Labs   Lab 01/31/24  0647 01/31/24  0715 01/31/24  0949   PH 7.380   < > 7.349*   PO2 71.0*   < > 318*   PCO2 45.0   < > 42.1   HCO3 26.6*   < > 23.2*   POCBASEDEF 1.10  --   --     < > = values in this interval not displayed.       Mechanical Ventilation Support:         Significant Imaging:  I have reviewed the pertinent imaging within the past 24 hours.        Assessment/Plan:     Assessment  Coronary artery disease status post CABG x3  Hypertension  Hyperlipidemia   Obstructive sleep apnea with CPAP at home   Status post liver transplant due to HCV      Plan  - Continue post-operative management per CV surgery post op protocol  -Wean mechanical ventilation as tolerated, extubate when appropriate; post op ABG and CXR pending  - Titrate vasopressor support to maintain adequate BP with MAP >65; currently on low dose Epi  - Monitor surgical drain output for signs of bleeding  - Multimodal pain control  - Will check Tacrolimus level; will restart prograft tonight  - Continue all ongoing ICU care     DVT Prophylaxis:NATALIE hose  GI Prophylaxis:Pepcid     32 minutes of critical care was time spent personally by me on the following activities: development of treatment plan with patient or surrogate and bedside caregivers, discussions with consultants, evaluation of patient's response to treatment, examination of patient, ordering and performing treatments and interventions, ordering and review of laboratory studies, ordering and review of radiographic studies, pulse oximetry, re-evaluation of patient's condition.  This critical care time did not overlap with that of any other provider or involve time for any procedures.     FER Summers  Pulmonary Critical Care Medicine  [facility] - [facility]  DOS: 01/31/2024

## 2024-02-01 LAB
ALBUMIN SERPL-MCNC: 3.3 G/DL (ref 3.4–4.8)
ALBUMIN/GLOB SERPL: 1.7 RATIO (ref 1.1–2)
ALP SERPL-CCNC: 32 UNIT/L (ref 40–150)
ALT SERPL-CCNC: 25 UNIT/L (ref 0–55)
AST SERPL-CCNC: 28 UNIT/L (ref 5–34)
BILIRUB SERPL-MCNC: 0.6 MG/DL
BUN SERPL-MCNC: 14.9 MG/DL (ref 8.4–25.7)
CALCIUM SERPL-MCNC: 7.9 MG/DL (ref 8.8–10)
CHLORIDE SERPL-SCNC: 109 MMOL/L (ref 98–107)
CO2 SERPL-SCNC: 21 MMOL/L (ref 23–31)
CREAT SERPL-MCNC: 1.32 MG/DL (ref 0.73–1.18)
ERYTHROCYTE [DISTWIDTH] IN BLOOD BY AUTOMATED COUNT: 14.6 % (ref 11.5–17)
GFR SERPLBLD CREATININE-BSD FMLA CKD-EPI: 59 MLS/MIN/1.73/M2
GLOBULIN SER-MCNC: 2 GM/DL (ref 2.4–3.5)
GLUCOSE SERPL-MCNC: 129 MG/DL (ref 82–115)
HCT VFR BLD AUTO: 30.1 % (ref 42–52)
HGB BLD-MCNC: 9.9 G/DL (ref 14–18)
MCH RBC QN AUTO: 30 PG (ref 27–31)
MCHC RBC AUTO-ENTMCNC: 32.9 G/DL (ref 33–36)
MCV RBC AUTO: 91.2 FL (ref 80–94)
NRBC BLD AUTO-RTO: 0 %
PLATELET # BLD AUTO: 93 X10(3)/MCL (ref 130–400)
PMV BLD AUTO: 12.9 FL (ref 7.4–10.4)
POCT GLUCOSE: 124 MG/DL (ref 70–110)
POCT GLUCOSE: 125 MG/DL (ref 70–110)
POCT GLUCOSE: 129 MG/DL (ref 70–110)
POCT GLUCOSE: 134 MG/DL (ref 70–110)
POTASSIUM SERPL-SCNC: 4.4 MMOL/L (ref 3.5–5.1)
PROT SERPL-MCNC: 5.3 GM/DL (ref 5.8–7.6)
RBC # BLD AUTO: 3.3 X10(6)/MCL (ref 4.7–6.1)
SODIUM SERPL-SCNC: 137 MMOL/L (ref 136–145)
TACROLIMUS TROUGH BLD-MCNC: 2.8 NG/ML
WBC # SPEC AUTO: 12.06 X10(3)/MCL (ref 4.5–11.5)

## 2024-02-01 PROCEDURE — 94760 N-INVAS EAR/PLS OXIMETRY 1: CPT

## 2024-02-01 PROCEDURE — 25000003 PHARM REV CODE 250: Performed by: NURSE PRACTITIONER

## 2024-02-01 PROCEDURE — 25000003 PHARM REV CODE 250: Performed by: PHYSICIAN ASSISTANT

## 2024-02-01 PROCEDURE — 85027 COMPLETE CBC AUTOMATED: CPT | Performed by: PHYSICIAN ASSISTANT

## 2024-02-01 PROCEDURE — 97162 PT EVAL MOD COMPLEX 30 MIN: CPT

## 2024-02-01 PROCEDURE — 99900035 HC TECH TIME PER 15 MIN (STAT)

## 2024-02-01 PROCEDURE — 63600175 PHARM REV CODE 636 W HCPCS: Performed by: INTERNAL MEDICINE

## 2024-02-01 PROCEDURE — S5010 5% DEXTROSE AND 0.45% SALINE: HCPCS | Performed by: PHYSICIAN ASSISTANT

## 2024-02-01 PROCEDURE — 80053 COMPREHEN METABOLIC PANEL: CPT | Performed by: PHYSICIAN ASSISTANT

## 2024-02-01 PROCEDURE — 21400001 HC TELEMETRY ROOM

## 2024-02-01 PROCEDURE — 25000003 PHARM REV CODE 250: Performed by: SPECIALIST

## 2024-02-01 PROCEDURE — 99024 POSTOP FOLLOW-UP VISIT: CPT | Mod: POP,,, | Performed by: PHYSICIAN ASSISTANT

## 2024-02-01 PROCEDURE — 27000221 HC OXYGEN, UP TO 24 HOURS

## 2024-02-01 PROCEDURE — 63600175 PHARM REV CODE 636 W HCPCS: Performed by: PHYSICIAN ASSISTANT

## 2024-02-01 PROCEDURE — 97110 THERAPEUTIC EXERCISES: CPT

## 2024-02-01 PROCEDURE — 94761 N-INVAS EAR/PLS OXIMETRY MLT: CPT

## 2024-02-01 PROCEDURE — 63600175 PHARM REV CODE 636 W HCPCS

## 2024-02-01 PROCEDURE — 25000003 PHARM REV CODE 250

## 2024-02-01 PROCEDURE — 99900031 HC PATIENT EDUCATION (STAT)

## 2024-02-01 PROCEDURE — 94799 UNLISTED PULMONARY SVC/PX: CPT

## 2024-02-01 RX ORDER — ESCITALOPRAM OXALATE 10 MG/1
10 TABLET ORAL DAILY
Status: DISCONTINUED | OUTPATIENT
Start: 2024-02-01 | End: 2024-02-06 | Stop reason: HOSPADM

## 2024-02-01 RX ORDER — POLYETHYLENE GLYCOL 3350 17 G/17G
17 POWDER, FOR SOLUTION ORAL DAILY PRN
Status: CANCELLED | OUTPATIENT
Start: 2024-02-01

## 2024-02-01 RX ORDER — BISACODYL 10 MG/1
10 SUPPOSITORY RECTAL DAILY PRN
Status: CANCELLED | OUTPATIENT
Start: 2024-02-01

## 2024-02-01 RX ORDER — DOCUSATE SODIUM 100 MG/1
200 CAPSULE, LIQUID FILLED ORAL 2 TIMES DAILY
Status: CANCELLED | OUTPATIENT
Start: 2024-02-01

## 2024-02-01 RX ORDER — OXYCODONE HYDROCHLORIDE 5 MG/1
5 TABLET ORAL EVERY 6 HOURS PRN
Status: DISCONTINUED | OUTPATIENT
Start: 2024-02-01 | End: 2024-02-03

## 2024-02-01 RX ORDER — FAMOTIDINE 20 MG/1
20 TABLET, FILM COATED ORAL 2 TIMES DAILY
Status: DISCONTINUED | OUTPATIENT
Start: 2024-02-01 | End: 2024-02-06 | Stop reason: HOSPADM

## 2024-02-01 RX ORDER — MUPIROCIN 20 MG/G
OINTMENT TOPICAL 2 TIMES DAILY
Status: CANCELLED | OUTPATIENT
Start: 2024-02-01 | End: 2024-02-06

## 2024-02-01 RX ORDER — ASPIRIN 81 MG/1
81 TABLET ORAL DAILY
Status: CANCELLED | OUTPATIENT
Start: 2024-02-01

## 2024-02-01 RX ORDER — ATORVASTATIN CALCIUM 10 MG/1
20 TABLET, FILM COATED ORAL NIGHTLY
Status: DISCONTINUED | OUTPATIENT
Start: 2024-02-01 | End: 2024-02-05

## 2024-02-01 RX ADMIN — TACROLIMUS 1 MG: 1 CAPSULE ORAL at 08:02

## 2024-02-01 RX ADMIN — TACROLIMUS 0.5 MG: 0.5 CAPSULE ORAL at 07:02

## 2024-02-01 RX ADMIN — ESCITALOPRAM OXALATE 10 MG: 10 TABLET ORAL at 04:02

## 2024-02-01 RX ADMIN — OXYCODONE HYDROCHLORIDE 10 MG: 5 TABLET ORAL at 11:02

## 2024-02-01 RX ADMIN — OXYCODONE HYDROCHLORIDE 10 MG: 5 TABLET ORAL at 07:02

## 2024-02-01 RX ADMIN — METOPROLOL TARTRATE 12.5 MG: 25 TABLET, FILM COATED ORAL at 08:02

## 2024-02-01 RX ADMIN — ASPIRIN 81 MG: 81 TABLET, COATED ORAL at 08:02

## 2024-02-01 RX ADMIN — ENOXAPARIN SODIUM 40 MG: 40 INJECTION SUBCUTANEOUS at 04:02

## 2024-02-01 RX ADMIN — SUCRALFATE 1 G: 1 TABLET ORAL at 10:02

## 2024-02-01 RX ADMIN — SUCRALFATE 1 G: 1 TABLET ORAL at 06:02

## 2024-02-01 RX ADMIN — CEFAZOLIN SODIUM 2 G: 2 SOLUTION INTRAVENOUS at 08:02

## 2024-02-01 RX ADMIN — OXYCODONE HYDROCHLORIDE 10 MG: 5 TABLET ORAL at 03:02

## 2024-02-01 RX ADMIN — SUCRALFATE 1 G: 1 TABLET ORAL at 04:02

## 2024-02-01 RX ADMIN — OXYCODONE HYDROCHLORIDE 5 MG: 5 TABLET ORAL at 08:02

## 2024-02-01 RX ADMIN — DOCUSATE SODIUM 100 MG: 100 CAPSULE, LIQUID FILLED ORAL at 08:02

## 2024-02-01 RX ADMIN — SUCRALFATE 1 G: 1 TABLET ORAL at 08:02

## 2024-02-01 RX ADMIN — HYDROCODONE BITARTRATE AND ACETAMINOPHEN 1 TABLET: 5; 325 TABLET ORAL at 04:02

## 2024-02-01 RX ADMIN — ATORVASTATIN CALCIUM 20 MG: 10 TABLET, FILM COATED ORAL at 08:02

## 2024-02-01 RX ADMIN — MUPIROCIN: 20 OINTMENT TOPICAL at 08:02

## 2024-02-01 RX ADMIN — FAMOTIDINE 20 MG: 20 TABLET, FILM COATED ORAL at 08:02

## 2024-02-01 RX ADMIN — FOLIC ACID 1 MG: 1 TABLET ORAL at 08:02

## 2024-02-01 RX ADMIN — DEXTROSE AND SODIUM CHLORIDE: 5; 450 INJECTION, SOLUTION INTRAVENOUS at 06:02

## 2024-02-01 RX ADMIN — KETOROLAC TROMETHAMINE 30 MG: 30 INJECTION INTRAMUSCULAR; INTRAVENOUS at 05:02

## 2024-02-01 RX ADMIN — KETOROLAC TROMETHAMINE 30 MG: 30 INJECTION INTRAMUSCULAR; INTRAVENOUS at 10:02

## 2024-02-01 NOTE — CONSULTS
"Inpatient consult to Cardiology  Consult performed by: Johan Ball ANP  Consult ordered by: Juventino Ferrell IV, MD [facility]    Cardiology  Consult Note    Patient Name: Radu Rock  MRN: [id number]  Admission Date: 1/31/2024  Hospital Length of Stay: 1 days  Code Status: Full Code   Attending Provider: Josh Solano MD   Consulting Provider: FATOU Muse  Primary Care Physician: Mairella Huntley MD  Principal Problem:<principal problem not specified>    Patient information was obtained from patient, past medical records, and ER records.     Subjective:     Chief Complaint: Reason for Consult: Post CABG     HPI: Mr. Rock is a 63 y/o male who is known to [facility], Dr. Carlos. The patient presented to [facility] on 1.31.24 for a Planned CABG with CV Surgery/Dr. Ferrell. He recently underwent a workup with Nuclear PET which was abnormal, prompting a LHC with noted MVCAD. He was referred to CV Surgery for CABG Evaluation and she was deemed a candidate for CABG. On 1.31.24 he underwent a CABG with results of LIMA to LAD, rSVG to OM, rSVG to PDA and Bilateral EVH. He tolerated the procedure and was admitted for observation in the ICU and further management. [facility] has been consulted for Post Operative Medical Management.     2.1.24: NAD. "I am good." Sitting in Bedside Chair. Denies SOB and Palps. + CP/Incisional.     PMH: HLD, HTN, MVCAD/CABG, Liver Transplant, Liver Transplant/Cirrhosis, Liver Cancer, HCV/Hepatitis C Virus, MI, Obesity, Osteoporosis, TAY/CPAP  PSH: Liver Transplant, CABG, Angiogram, Cholecystectomy, Appendectomy, LE Surgery, Shoulder Surgery, EGD  Family History: Denies Family History of Heart Disease  Social History: Denies Illicit Drug, ETOH and Tobacco Use    Previous Cardiac Diagnostics:   CABG 1.31.24:  LIMA to LAD, rSVG to OM, rSVG to PDA, Bilateral EVH.    C 1.22.24:  Procedures performed:  1. Ultrasound-guided right radial access  2. Coronary angiography  3. " Left ventricular hemodynamics  4. IFR interrogation  -LAD 0.82  -circumflex 0.88  -PDA 0.91  5. LV EDP-19  6. Lima-angio  Findings:  1. Left main-normal  2. Lad-60-70% lad mid ISR involving large diagonal, IFR positive, 0.82 in LAD  3. LCX-non dominant, 80% proximal lesion extending into OM2, OM1 70% ostial lesion  4. RCA-dominant, 60% proximal lesion, 80% ostial PDA  5. LIMA patent  Plan:  1. Maximize medical management  2. Consult CTS for bypass evaluation.  If patient is not a candidate will need Laser atherectomy of LAD stent and PCI of Mid LAD, Circumlex, and possibly PDA.  Would re-evalaute PDA at time of PCI, but I think it is bad enough for intervention.  Inferoapical walls are involved on stress test.    PET 1.19.24:  This is an abnormal perfusion study.   This scan is suggestive of moderate to high risk for future cardiovascular events.   Small partially reversible perfusion abnormality of severe intensity in the apical segment. Small reversible perfusion abnormality of moderate intensity in the apical anterior segment. Small partially reversible perfusion abnormality of severe intensity in the apical septal segment. Small partially reversible perfusion abnormality of severe intensity in the apical inferior segment.   The left ventricular cavity is noted to be normal on the stress studies. The stress left ventricular ejection fraction was calculated to be 64% and left ventricular global function is normal. The rest left ventricular cavity is noted to be normal. The rest left ventricular ejection fraction was calculated to be 64% and rest left ventricular global function is normal.   Compared with rest and stress studies the ejection fraction remains unchanged (64).   The study quality is average.   There was a rise in myocardial blood flow between rest and stress.  Global myocardial blood flow reserve was 3.01.  Normal global coronary flow reserve is suggestive of low coronary event risk.    ECHO  1.10.24:  The left ventricle is normal in size with moderate to severe, asymmetric septal left ventricular hypertrophy and mild, asymmetric posterior left ventricular hypertrophy and normal left ventricular systolic function. The left ventricular ejection fraction is 65%. Left ventricular diastolic function is normal. Bradycardia is noted.  Mild dilatation of the aortic root is noted at 3.9 cms. The aortic arch is normal at 3.1 cms.   Unable to adequately visualize and assess the pulmonary artery.  Poor short axis echo windows.  The study quality is average.     Review of patient's allergies indicates:  No Known Allergies    No current facility-administered medications on file prior to encounter.     Current Outpatient Medications on File Prior to Encounter   Medication Sig    alendronate (FOSAMAX) 35 MG tablet Take 35 mg by mouth every 7 days. Mondays    amLODIPine (NORVASC) 5 MG tablet Take 5 mg by mouth nightly.    aspirin (ECOTRIN) 81 MG EC tablet Take 81 mg by mouth once.    atorvastatin (LIPITOR) 20 MG tablet Take 20 mg by mouth every evening.    EScitalopram oxalate (LEXAPRO) 10 MG tablet Take 10 mg by mouth every morning.    magnesium oxide (MAG-OX) 400 mg (241.3 mg magnesium) tablet Take 400 mg by mouth 2 (two) times daily.    metoprolol tartrate (LOPRESSOR) 25 MG tablet Take 25 mg by mouth 2 (two) times daily.    olmesartan (BENICAR) 20 MG tablet Take 20 mg by mouth once daily.    rifAXIMin (XIFAXAN) 550 mg Tab Take 550 mg by mouth 2 (two) times daily.    tacrolimus (PROGRAF) 1 MG Cap Take 1 mg by mouth. 1 mg in the am and 0.5mg in the pm    cholecalciferol, vitamin D3, 1,250 mcg (50,000 unit) capsule Take 50,000 Units by mouth every 7 days. Monday    clopidogreL (PLAVIX) 75 mg tablet Take 75 mg by mouth once daily.     Review of Systems   Constitutional:  Positive for fatigue.   Respiratory:  Negative for shortness of breath.    Cardiovascular:  Positive for chest pain (Incisional). Negative for  palpitations and leg swelling.   All other systems reviewed and are negative.    Objective:     Vital Signs (Most Recent):  Temp: 98.8 °F (37.1 °C) (02/01/24 0706)  Pulse: 82 (02/01/24 0706)  Resp: (P) 16 (02/01/24 0818)  BP: (!) 90/51 (02/01/24 0706)  SpO2: (!) 92 % (02/01/24 0706) Vital Signs (24h Range):  Temp:  [97.5 °F (36.4 °C)-100.4 °F (38 °C)] 98.8 °F (37.1 °C)  Pulse:  [66-90] 82  Resp:  [10-25] (P) 16  SpO2:  [90 %-100 %] 92 %  BP: ()/(48-72) 90/51  Arterial Line BP: ()/(41-61) 115/50     Weight: 106.4 kg (234 lb 9.1 oz)  Body mass index is 33.66 kg/m².    SpO2: (!) 92 %         Intake/Output Summary (Last 24 hours) at 2/1/2024 0819  Last data filed at 2/1/2024 0622  Gross per 24 hour   Intake 3116.69 ml   Output 2760 ml   Net 356.69 ml     Lines/Drains/Airways       Central Venous Catheter Line  Duration              Introducer with Double Lumen 01/31/24 1106 Internal Jugular Right <1 day              Drain  Duration                  Urethral Catheter 01/31/24 0650 Silicone 16 Fr. 1 day         Chest Tube 01/31/24 0933 Mediastinal 24 Fr. <1 day              Arterial Line  Duration             Arterial Line 01/31/24 0636 Left Radial 1 day              Peripheral Intravenous Line  Duration                  Peripheral IV - Single Lumen 01/31/24 0540 18 G Anterior;Distal;Left Forearm 1 day                  Significant Labs:  Recent Results (from the past 72 hour(s))   POCT glucose    Collection Time: 01/31/24  5:54 AM   Result Value Ref Range    POCT Glucose 96 70 - 110 mg/dL   RT Blood Gas    Collection Time: 01/31/24  6:47 AM   Result Value Ref Range    Sample Type Arterial Blood     Sample site Arterial Line     Drawn by AO RN     pH, Blood gas 7.380 7.350 - 7.450    pCO2, Blood gas 45.0 35.0 - 45.0 mmHg    pO2, Blood gas 71.0 (L) 80.0 - 100.0 mmHg    Sodium, Blood Gas 137 137 - 145 mmol/L    Potassium, Blood Gas 4.5 3.5 - 5.0 mmol/L    Calcium Level Ionized 1.17 1.12 - 1.23 mmol/L    TOC2,  Blood gas 28.0 mmol/L    Base Excess, Blood gas 1.10 -2.00 - 2.00 mmol/L    sO2, Blood gas 93.7 %    HCO3, Blood gas 26.6 (H) 22.0 - 26.0 mmol/L    THb, Blood gas 12.8 12 - 16 g/dL    O2 Hb, Blood Gas 92.6 (L) 94.0 - 97.0 %    CO Hgb 0.3 (L) 0.5 - 1.5 %    Met Hgb 0.0 (L) 0.4 - 1.5 %    Allens Test N/A     FIO2, Blood gas 21 %   ISTAT PROCEDURE    Collection Time: 01/31/24  7:15 AM   Result Value Ref Range    POC PH 7.373 7.35 - 7.45    POC PCO2 43.5 35 - 45 mmHg    POC PO2 44 40 - 60 mmHg    POC HCO3 25.3 24 - 28 mmol/L    POC BE 0 -2 to 2 mmol/L    POC SATURATED O2 79 95 - 100 %    POC pH Temp 7.387     POC pCO2 Temp 41.7 mmHg    POC pO2 Temp 41 mmHg    POC Glucose 107 70 - 110 mg/dL    POC Sodium 139 136 - 145 mmol/L    POC Potassium 4.7 3.5 - 5.1 mmol/L    POC TCO2 27 24 - 29 mmol/L    POC Ionized Calcium 1.24 1.06 - 1.42 mmol/L    POC Hematocrit 36 36 - 54 %PCV    POC HEMOGLOBIN 12 g/dL    Sample VENOUS     FiO2 100     POC Temp 36.0 C    Basic Metabolic Panel    Collection Time: 01/31/24  7:58 AM   Result Value Ref Range    Sodium Level 145 136 - 145 mmol/L    Potassium Level 4.0 3.5 - 5.1 mmol/L    Chloride 114 (H) 98 - 107 mmol/L    Carbon Dioxide 21 (L) 23 - 31 mmol/L    Glucose Level 159 (H) 82 - 115 mg/dL    Blood Urea Nitrogen 18.7 8.4 - 25.7 mg/dL    Creatinine 1.13 0.73 - 1.18 mg/dL    BUN/Creatinine Ratio 17     Calcium Level Total 9.5 8.8 - 10.0 mg/dL    Anion Gap 10.0 mEq/L    eGFR >60 mls/min/1.73/m2   Protime-INR    Collection Time: 01/31/24  7:58 AM   Result Value Ref Range    PT 22.7 (H) 12.5 - 14.5 seconds    INR 2.0 (H) <=1.3   ISTAT PROCEDURE    Collection Time: 01/31/24  8:09 AM   Result Value Ref Range    POC PH 7.351 7.35 - 7.45    POC PCO2 45.2 (H) 35 - 45 mmHg    POC PO2 302 (HH) 40 - 60 mmHg    POC HCO3 25.0 24 - 28 mmol/L    POC BE -1 -2 to 2 mmol/L    POC SATURATED O2 100 95 - 100 %    POC pH Temp 7.365     POC pCO2 Temp 43.3 mmHg    POC pO2 Temp 298 mmHg    POC Glucose 168 (H) 70 -  110 mg/dL    POC Sodium 135 (L) 136 - 145 mmol/L    POC Potassium 5.3 (H) 3.5 - 5.1 mmol/L    POC TCO2 26 24 - 29 mmol/L    POC Ionized Calcium 1.19 1.06 - 1.42 mmol/L    POC Hematocrit 35 (L) 36 - 54 %PCV    POC HEMOGLOBIN 12 g/dL    Sample VENOUS     FiO2 100     POC Temp 36.0 C    ISTAT PROCEDURE    Collection Time: 01/31/24  8:36 AM   Result Value Ref Range    POC PH 7.316 (L) 7.35 - 7.45    POC PCO2 51.4 (H) 35 - 45 mmHg    POC PO2 233 (H) 80 - 100 mmHg    POC HCO3 26.3 24 - 28 mmol/L    POC BE 0 -2 to 2 mmol/L    POC SATURATED O2 100 95 - 100 %    POC pH Temp 7.330     POC pCO2 Temp 49.2 mmHg    POC pO2 Temp 228 mmHg    POC Glucose 179 (H) 70 - 110 mg/dL    POC Sodium 136 136 - 145 mmol/L    POC Potassium 7.2 (HH) 3.5 - 5.1 mmol/L    POC TCO2 28 (H) 23 - 27 mmol/L    POC Ionized Calcium 1.02 (L) 1.06 - 1.42 mmol/L    POC Hematocrit 26 (L) 36 - 54 %PCV    POC HEMOGLOBIN 9 g/dL    Sample ARTERIAL     FiO2 55    ISTAT PROCEDURE    Collection Time: 01/31/24  8:40 AM   Result Value Ref Range    POC PH 7.285 (LL) 7.35 - 7.45    POC PCO2 56.4 (H) 35 - 45 mmHg    POC PO2 38 (LL) 40 - 60 mmHg    POC HCO3 26.8 24 - 28 mmol/L    POC BE 0 -2 to 2 mmol/L    POC SATURATED O2 65 95 - 100 %    POC pH Temp 7.299     POC pCO2 Temp 53.9 mmHg    POC pO2 Temp 36 mmHg    POC Glucose 199 (H) 70 - 110 mg/dL    POC Sodium 137 136 - 145 mmol/L    POC Potassium 6.1 (H) 3.5 - 5.1 mmol/L    POC TCO2 28 24 - 29 mmol/L    POC Ionized Calcium 1.06 1.06 - 1.42 mmol/L    POC Hematocrit 27 (L) 36 - 54 %PCV    POC HEMOGLOBIN 9 g/dL    Sample VENOUS     FiO2 100     POC Temp 36.0 C    ISTAT PROCEDURE    Collection Time: 01/31/24  9:05 AM   Result Value Ref Range    POC PH 7.365 7.35 - 7.45    POC PCO2 43.0 35 - 45 mmHg    POC PO2 301 (H) 80 - 100 mmHg    POC HCO3 24.6 24 - 28 mmol/L    POC BE -1 -2 to 2 mmol/L    POC SATURATED O2 100 95 - 100 %    POC pH Temp 7.396     POC pCO2 Temp 39.1 mmHg    POC pO2 Temp 291 mmHg    POC Glucose 192 (H) 70 -  110 mg/dL    POC Sodium 140 136 - 145 mmol/L    POC Potassium 5.5 (H) 3.5 - 5.1 mmol/L    POC TCO2 26 23 - 27 mmol/L    POC Ionized Calcium 0.96 (L) 1.06 - 1.42 mmol/L    POC Hematocrit 26 (L) 36 - 54 %PCV    POC HEMOGLOBIN 9 g/dL    Sample ARTERIAL     FiO2 70     POC Temp 34.8 C    ISTAT PROCEDURE    Collection Time: 01/31/24  9:49 AM   Result Value Ref Range    POC PH 7.349 (L) 7.35 - 7.45    POC PCO2 42.1 35 - 45 mmHg    POC PO2 318 (HH) 40 - 60 mmHg    POC HCO3 23.2 (L) 24 - 28 mmol/L    POC BE -2 -2 to 2 mmol/L    POC SATURATED O2 100 95 - 100 %    POC pH Temp 7.363     POC pCO2 Temp 40.3 mmHg    POC pO2 Temp 313 mmHg    POC Glucose 169 (H) 70 - 110 mg/dL    POC Sodium 142 136 - 145 mmol/L    POC Potassium 4.8 3.5 - 5.1 mmol/L    POC TCO2 24 24 - 29 mmol/L    POC Ionized Calcium 1.38 1.06 - 1.42 mmol/L    POC Hematocrit 27 (L) 36 - 54 %PCV    POC HEMOGLOBIN 9 g/dL    Sample VENOUS     FiO2 100     POC Temp 36.0 C    ISTAT PROCEDURE    Collection Time: 01/31/24 10:10 AM   Result Value Ref Range    POC PH 7.307 (L) 7.35 - 7.45    POC PCO2 44.8 35 - 45 mmHg    POC PO2 252 (H) 80 - 100 mmHg    POC HCO3 22.4 (L) 24 - 28 mmol/L    POC BE -4 (L) -2 to 2 mmol/L    POC SATURATED O2 100 95 - 100 %    POC Glucose 159 (H) 70 - 110 mg/dL    POC Sodium 142 136 - 145 mmol/L    POC Potassium 4.0 3.5 - 5.1 mmol/L    POC TCO2 24 23 - 27 mmol/L    POC Ionized Calcium 1.43 (H) 1.06 - 1.42 mmol/L    POC Hematocrit 30 (L) 36 - 54 %PCV    POC HEMOGLOBIN 10 g/dL    Sample ARTERIAL    POCT glucose    Collection Time: 01/31/24 10:42 AM   Result Value Ref Range    POCT Glucose 147 (H) 70 - 110 mg/dL   APTT    Collection Time: 01/31/24 11:28 AM   Result Value Ref Range    PTT 27.0 23.2 - 33.7 seconds   CBC with Differential    Collection Time: 01/31/24 11:28 AM   Result Value Ref Range    WBC 22.69 (H) 4.50 - 11.50 x10(3)/mcL    RBC 3.67 (L) 4.70 - 6.10 x10(6)/mcL    Hgb 11.0 (L) 14.0 - 18.0 g/dL    Hct 33.9 (L) 42.0 - 52.0 %    MCV 92.4  80.0 - 94.0 fL    MCH 30.0 27.0 - 31.0 pg    MCHC 32.4 (L) 33.0 - 36.0 g/dL    RDW 14.4 11.5 - 17.0 %    Platelet 102 (L) 130 - 400 x10(3)/mcL    MPV 12.7 (H) 7.4 - 10.4 fL    Neut % 72.8 %    Lymph % 17.8 %    Mono % 7.3 %    Eos % 1.1 %    Basophil % 0.4 %    Lymph # 4.05 0.6 - 4.6 x10(3)/mcL    Neut # 16.50 (H) 2.1 - 9.2 x10(3)/mcL    Mono # 1.65 (H) 0.1 - 1.3 x10(3)/mcL    Eos # 0.26 0 - 0.9 x10(3)/mcL    Baso # 0.09 <=0.2 x10(3)/mcL    IG# 0.14 (H) 0 - 0.04 x10(3)/mcL    IG% 0.6 %    NRBC% 0.0 %   RT Blood Gas    Collection Time: 01/31/24 11:37 AM   Result Value Ref Range    Sample Type Arterial Blood     Sample site Arterial Line     Drawn by sd rrt     pH, Blood gas 7.310 (L) 7.350 - 7.450    pCO2, Blood gas 43.0 35.0 - 45.0 mmHg    pO2, Blood gas 79.0 (L) 80.0 - 100.0 mmHg    Sodium, Blood Gas 141 137 - 145 mmol/L    Potassium, Blood Gas 3.6 3.5 - 5.0 mmol/L    Calcium Level Ionized 1.19 1.12 - 1.23 mmol/L    TOC2, Blood gas 23.0 mmol/L    Base Excess, Blood gas -4.50 mmol/L    sO2, Blood gas 94.0 %    HCO3, Blood gas 21.7 (L) 22.0 - 26.0 mmol/L    Allens Test N/A     MODE CPAP     Oxygen Device, Blood gas Ventilator     FIO2, Blood gas 35 %    CPAP 5 cm H2O    PS 10.0 cmH2O   POCT glucose    Collection Time: 01/31/24 11:37 AM   Result Value Ref Range    POCT Glucose 150 (H) 70 - 110 mg/dL   POCT glucose    Collection Time: 01/31/24 12:58 PM   Result Value Ref Range    POCT Glucose 131 (H) 70 - 110 mg/dL   POCT glucose    Collection Time: 01/31/24  3:06 PM   Result Value Ref Range    POCT Glucose 109 70 - 110 mg/dL   Protime-INR    Collection Time: 01/31/24  3:08 PM   Result Value Ref Range    PT 15.3 (H) 12.5 - 14.5 seconds    INR 1.2 <=1.3   Potassium    Collection Time: 01/31/24  3:08 PM   Result Value Ref Range    Potassium Level 4.1 3.5 - 5.1 mmol/L   Hemoglobin and Hematocrit    Collection Time: 01/31/24  3:08 PM   Result Value Ref Range    Hgb 10.2 (L) 14.0 - 18.0 g/dL    Hct 30.1 (L) 42.0 - 52.0 %    APTT    Collection Time: 01/31/24  3:08 PM   Result Value Ref Range    PTT 29.4 23.2 - 33.7 seconds   POCT glucose    Collection Time: 01/31/24  4:07 PM   Result Value Ref Range    POCT Glucose 108 70 - 110 mg/dL   Transesophageal echo (LALITA)    Collection Time: 01/31/24  4:50 PM   Result Value Ref Range    BSA 2.23 m2   POCT glucose    Collection Time: 01/31/24  5:15 PM   Result Value Ref Range    POCT Glucose 122 (H) 70 - 110 mg/dL   POCT glucose    Collection Time: 01/31/24  6:18 PM   Result Value Ref Range    POCT Glucose 117 (H) 70 - 110 mg/dL   POCT glucose    Collection Time: 01/31/24  7:49 PM   Result Value Ref Range    POCT Glucose 138 (H) 70 - 110 mg/dL   POCT glucose    Collection Time: 01/31/24 10:13 PM   Result Value Ref Range    POCT Glucose 129 (H) 70 - 110 mg/dL   POCT glucose    Collection Time: 02/01/24 12:02 AM   Result Value Ref Range    POCT Glucose 124 (H) 70 - 110 mg/dL   POCT glucose    Collection Time: 02/01/24  1:55 AM   Result Value Ref Range    POCT Glucose 129 (H) 70 - 110 mg/dL   POCT glucose    Collection Time: 02/01/24  4:18 AM   Result Value Ref Range    POCT Glucose 134 (H) 70 - 110 mg/dL   CBC Without Differential    Collection Time: 02/01/24  4:19 AM   Result Value Ref Range    WBC 12.06 (H) 4.50 - 11.50 x10(3)/mcL    RBC 3.30 (L) 4.70 - 6.10 x10(6)/mcL    Hgb 9.9 (L) 14.0 - 18.0 g/dL    Hct 30.1 (L) 42.0 - 52.0 %    MCV 91.2 80.0 - 94.0 fL    MCH 30.0 27.0 - 31.0 pg    MCHC 32.9 (L) 33.0 - 36.0 g/dL    RDW 14.6 11.5 - 17.0 %    Platelet 93 (L) 130 - 400 x10(3)/mcL    MPV 12.9 (H) 7.4 - 10.4 fL    NRBC% 0.0 %   Comprehensive Metabolic Panel    Collection Time: 02/01/24  4:19 AM   Result Value Ref Range    Sodium Level 137 136 - 145 mmol/L    Potassium Level 4.4 3.5 - 5.1 mmol/L    Chloride 109 (H) 98 - 107 mmol/L    Carbon Dioxide 21 (L) 23 - 31 mmol/L    Glucose Level 129 (H) 82 - 115 mg/dL    Blood Urea Nitrogen 14.9 8.4 - 25.7 mg/dL    Creatinine 1.32 (H) 0.73 - 1.18 mg/dL     Calcium Level Total 7.9 (L) 8.8 - 10.0 mg/dL    Protein Total 5.3 (L) 5.8 - 7.6 gm/dL    Albumin Level 3.3 (L) 3.4 - 4.8 g/dL    Globulin 2.0 (L) 2.4 - 3.5 gm/dL    Albumin/Globulin Ratio 1.7 1.1 - 2.0 ratio    Bilirubin Total 0.6 <=1.5 mg/dL    Alkaline Phosphatase 32 (L) 40 - 150 unit/L    Alanine Aminotransferase 25 0 - 55 unit/L    Aspartate Aminotransferase 28 5 - 34 unit/L    eGFR 59 mls/min/1.73/m2   POCT glucose    Collection Time: 02/01/24  6:09 AM   Result Value Ref Range    POCT Glucose 125 (H) 70 - 110 mg/dL     EKG: None    Telemetry: SR    Physical Exam  Constitutional:       General: He is not in acute distress.     Appearance: Normal appearance.   HENT:      Head: Normocephalic.      Mouth/Throat:      Mouth: Mucous membranes are moist.   Eyes:      Extraocular Movements: Extraocular movements intact.      Conjunctiva/sclera: Conjunctivae normal.   Cardiovascular:      Rate and Rhythm: Normal rate and regular rhythm.      Pulses: Normal pulses.      Heart sounds: Normal heart sounds.   Pulmonary:      Effort: Pulmonary effort is normal.      Breath sounds: Normal breath sounds.   Abdominal:      Palpations: Abdomen is soft.   Skin:     General: Skin is warm and dry.      Comments: Midline Sternotomy Dressing C/D/I. + CTs    Neurological:      General: No focal deficit present.      Mental Status: He is alert and oriented to person, place, and time. Mental status is at baseline.   Psychiatric:         Mood and Affect: Mood normal.         Behavior: Behavior normal.       Home Medications:   No current facility-administered medications on file prior to encounter.     Current Outpatient Medications on File Prior to Encounter   Medication Sig Dispense Refill    alendronate (FOSAMAX) 35 MG tablet Take 35 mg by mouth every 7 days. Mondays      amLODIPine (NORVASC) 5 MG tablet Take 5 mg by mouth nightly.      aspirin (ECOTRIN) 81 MG EC tablet Take 81 mg by mouth once.      atorvastatin (LIPITOR) 20 MG  tablet Take 20 mg by mouth every evening.      EScitalopram oxalate (LEXAPRO) 10 MG tablet Take 10 mg by mouth every morning.      magnesium oxide (MAG-OX) 400 mg (241.3 mg magnesium) tablet Take 400 mg by mouth 2 (two) times daily.      metoprolol tartrate (LOPRESSOR) 25 MG tablet Take 25 mg by mouth 2 (two) times daily.      olmesartan (BENICAR) 20 MG tablet Take 20 mg by mouth once daily.      rifAXIMin (XIFAXAN) 550 mg Tab Take 550 mg by mouth 2 (two) times daily.      tacrolimus (PROGRAF) 1 MG Cap Take 1 mg by mouth. 1 mg in the am and 0.5mg in the pm      cholecalciferol, vitamin D3, 1,250 mcg (50,000 unit) capsule Take 50,000 Units by mouth every 7 days. Monday      clopidogreL (PLAVIX) 75 mg tablet Take 75 mg by mouth once daily.       Current Inpatient Medications:    Current Facility-Administered Medications:     acetaminophen oral solution 650 mg, 650 mg, Per OG tube, Q6H PRN, Theron Morris PA-C    albumin human 5% bottle 12.5 g, 12.5 g, Intravenous, PRN, Theron Morris PA-C, Stopped at 01/31/24 1302    aspirin EC tablet 81 mg, 81 mg, Oral, Daily, Theron Morris PA-C    calcium gluconate 1 g in NS IVPB (premixed), 1 g, Intravenous, PRN, Theron Morris PA-C    calcium gluconate 1 g in NS IVPB (premixed), 2 g, Intravenous, PRN, Theron Morris PA-C    calcium gluconate 1 g in NS IVPB (premixed), 3 g, Intravenous, PRN, Theron Morris PA-C    cefazolin (ANCEF) 2 gram in dextrose 5% 50 mL IVPB (premix), 2 g, Intravenous, Q12H, Theron Morris PA-C, Stopped at 01/31/24 2113    dextrose 10% bolus 125 mL 125 mL, 12.5 g, Intravenous, PRN, Theron Morris PA-C    dextrose 10% bolus 250 mL 250 mL, 25 g, Intravenous, PRN, Theron Morris PA-C    dextrose 5 % and 0.45 % NaCl infusion, , Intravenous, Continuous, Theron Morris PA-C, Last Rate: 100 mL/hr at 02/01/24 0622, Rate Verify at 02/01/24 0622    docusate sodium capsule 100 mg, 100 mg, Oral, BID, Theron Morris PA-C    enoxaparin  injection 40 mg, 40 mg, Subcutaneous, Daily, Theron Morris PA-C    EPINEPHrine (ADRENALIN) 5 mg in dextrose 5 % (D5W) 250 mL infusion, 0-2 mcg/kg/min, Intravenous, Continuous, Theron Morris PA-C, Stopped at 01/31/24 1058    famotidine tablet 20 mg, 20 mg, Oral, BID, Kristie Cook MD    folic acid tablet 1 mg, 1 mg, Oral, Daily, Theron Morris PA-C    HYDROcodone-acetaminophen 5-325 mg per tablet 1 tablet, 1 tablet, Oral, Q4H PRN, Theron Morris PA-C, 1 tablet at 02/01/24 0412    insulin regular in 0.9 % NaCl 100 unit/100 mL (1 unit/mL) infusion, 0-52 Units/hr, Intravenous, Continuous, Theron Morris PA-C, Last Rate: 2 mL/hr at 02/01/24 0622, 2 Units/hr at 02/01/24 0622    ketorolac injection 30 mg, 30 mg, Intravenous, Q6H PRN, Josh Solano MD, 30 mg at 01/31/24 2358    lactulose 10 gram/15 ml solution 20 g, 20 g, Oral, Q6H PRN, Theron Morris PA-C    loperamide capsule 2 mg, 2 mg, Oral, Continuous PRN, Theron Morris PA-C    magnesium sulfate 2g in water 50mL IVPB (premix), 2 g, Intravenous, PRN, Theron Morris PA-C    magnesium sulfate 2g in water 50mL IVPB (premix), 4 g, Intravenous, PRN, Theron Morris PA-C    metoclopramide injection 5 mg, 5 mg, Intravenous, Q6H PRN, Theron Morris PA-C    metoprolol tartrate (LOPRESSOR) split tablet 12.5 mg, 12.5 mg, Oral, BID, Theron Morris PA-C    morphine injection 4 mg, 4 mg, Intravenous, Q4H PRN, Theron Morris PA-C, 4 mg at 01/31/24 1051    mupirocin 2 % ointment, , Nasal, BID, Theron Morris PA-C, Given at 01/31/24 2048    ondansetron injection 4 mg, 4 mg, Intravenous, Q4H PRN, Theron Morris PA-C, 4 mg at 01/31/24 2044    oxyCODONE immediate release tablet 5 mg, 5 mg, Oral, Q6H PRN, Fernando Carpenter PA    oxyCODONE immediate release tablet Tab 10 mg, 10 mg, Oral, Q4H PRN, Theron Morris PA-C, 10 mg at 01/31/24 2044    potassium chloride 20 mEq in 100 mL IVPB (FOR CENTRAL LINE ADMINISTRATION ONLY), 20 mEq,  Intravenous, PRN, Theron Morris, PA-C    potassium chloride 20 mEq in 100 mL IVPB (FOR CENTRAL LINE ADMINISTRATION ONLY), 40 mEq, Intravenous, PRN, Theron Morris C, PA-C    potassium chloride 20 mEq in 100 mL IVPB (FOR CENTRAL LINE ADMINISTRATION ONLY), 60 mEq, Intravenous, PRN, Theron Morris, PA-C    sodium phosphate 15 mmol in dextrose 5 % (D5W) 250 mL IVPB, 15 mmol, Intravenous, PRN, Theron Morris C, PA-C    sodium phosphate 20.01 mmol in dextrose 5 % (D5W) 250 mL IVPB, 20.01 mmol, Intravenous, PRN, Arturo, Theron C, PA-C    sodium phosphate 30 mmol in dextrose 5 % (D5W) 250 mL IVPB, 30 mmol, Intravenous, PRN, Theron Morris, PA-C    sucralfate tablet 1 g, 1 g, Oral, QID (AC & HS), Theron Morris, PA-C, 1 g at 02/01/24 0610    tacrolimus capsule 0.5 mg, 0.5 mg, Oral, Daily PM, Jacklyn Sam FNP, 0.5 mg at 01/31/24 2043    tacrolimus capsule 1 mg, 1 mg, Oral, Daily AM, Josh Solano MD, 1 mg at 01/31/24 1137    VTE Risk Mitigation (From admission, onward)           Ordered     enoxaparin injection 40 mg  Daily         01/31/24 1008     Place sequential compression device  Until discontinued         01/31/24 1008     IP VTE HIGH RISK PATIENT  Once         01/31/24 0840                  Assessment:   MVCAD    - CABG (1.31.24) - LIMA to LAD, rSVG to OM, rSVG to PDA, Bilateral EVH    - LHC (1.22.24) - 1. Left main-normal 2. Lad-60-70% lad mid ISR involving large diagonal, IFR positive, 0.82 in LAD 3. LCX-non dominant, 80% proximal lesion extending into OM2, OM1 70% ostial lesion 4. RCA-dominant, 60% proximal lesion, 80% ostial PDA 5. LIMA patent    - ECHO 1.10.24 - LVEF 65%  HTN  HLD  Cirrhosis/Liver Cancer/HCV s/p Liver Transplant  Hx of MI  Obesity  Osteoporosis  TAY/CPAP  No Hx of GIB     Plan:   Continue ASA and BB  Start Atorvastatin 20mg PO Qday - Hx of Liver Transplant/Cautious Use of Statin - Monitor LFTs  Aggressive Mobilization of PT and Q1HR IS  Labs and EKG in AM: CBC, CMP and  Mg    Thank you for your consult.     FATOU Muse  Cardiology  [facility]  02/01/2024     I agree with the findings of the complexity of problems addressed and take responsibility for the plan's risks and complications. I approved the plan documented by Johan Ball NP.  Post CABG  Continue ASA, BB, Atorvastatin

## 2024-02-01 NOTE — PROGRESS NOTES
Pulmonary & Critical Care Medicine   Progress Note      Presenting History/HPI:    This is a 54-year-old male with a history of CAD with PCI, hypertension, hyperlipidemia, TAY with CPAP, hepatitis C with cirrhosis status post liver transplant.  He recently had an abnormal PET and underwent left heart catheterization with Dr. Carlos on 1/22/2024 which revealed multivessel coronary artery disease and CT surgery was consulted.  He presented on 01/31/2024 for a CABG x3 with LIMA to the LAD, SVG to the OM and PDA with placement of 1 chest tube. Received 820 of cell saver, no blood products.  He was transferred postoperatively to the intensive care unit, intubated on mechanical ventilation, requiring vasopressor support with low dose epi.       Interval History:  -postop day 1 status post three-vessel CABG   -no major issues or changes overnight   -labs demonstrate white blood cell count 12.6 hemoglobin 9.9 platelets 93 K creatinine up to 1.32   -patient seen sitting upright in bed with no complaints at this time except for some expected anterior chest pain postoperatively      Scheduled Medications:    aspirin  81 mg Oral Daily    atorvastatin  20 mg Oral QHS    docusate sodium  100 mg Oral BID    enoxparin  40 mg Subcutaneous Daily    famotidine  20 mg Oral BID    folic acid  1 mg Oral Daily    metoprolol tartrate  12.5 mg Oral BID    mupirocin   Nasal BID    sucralfate  1 g Oral QID (AC & HS)    tacrolimus  0.5 mg Oral Daily PM    tacrolimus  1 mg Oral Daily AM       PRN Medications:   acetaminophen, albumin human 5%, calcium gluconate IVPB, calcium gluconate IVPB, calcium gluconate IVPB, dextrose 10%, dextrose 10%, HYDROcodone-acetaminophen, ketorolac, lactulose 10 gram/15 ml, loperamide, magnesium sulfate IVPB, magnesium sulfate IVPB, metoclopramide, morphine, ondansetron, oxyCODONE, oxyCODONE, potassium chloride in water, potassium chloride in water, potassium chloride in water, sodium phosphate 15 mmol in  dextrose 5 % (D5W) 250 mL IVPB, sodium phosphate 20.01 mmol in dextrose 5 % (D5W) 250 mL IVPB, sodium phosphate 30 mmol in dextrose 5 % (D5W) 250 mL IVPB      Infusions:     dextrose 5 % and 0.45 % NaCl Stopped (02/01/24 0825)    EPINEPHrine Stopped (01/31/24 1058)    insulin regular 1 units/mL infusion orderable (CTS POST-OP) Stopped (02/01/24 0829)    loperamide           Fluid Balance:     Intake/Output Summary (Last 24 hours) at 2/1/2024 0944  Last data filed at 2/1/2024 0903  Gross per 24 hour   Intake 3377.39 ml   Output 2485 ml   Net 892.39 ml         Vital Signs:   Vitals:    02/01/24 0912   BP:    Pulse: 86   Resp: 20   Temp:          Physical Exam  Vitals and nursing note reviewed.   Constitutional:       General: He is not in acute distress.     Appearance: Normal appearance. He is not ill-appearing or toxic-appearing.   HENT:      Head: Normocephalic and atraumatic.      Right Ear: External ear normal.      Left Ear: External ear normal.      Nose: Nose normal.      Mouth/Throat:      Mouth: Mucous membranes are moist.      Pharynx: Oropharynx is clear. No oropharyngeal exudate or posterior oropharyngeal erythema.   Eyes:      General: No scleral icterus.     Extraocular Movements: Extraocular movements intact.      Conjunctiva/sclera: Conjunctivae normal.      Pupils: Pupils are equal, round, and reactive to light.   Neck:      Vascular: No carotid bruit.   Cardiovascular:      Rate and Rhythm: Normal rate and regular rhythm.      Pulses: Normal pulses.      Heart sounds: Normal heart sounds. No murmur heard.     No friction rub. No gallop.      Comments: Median sternotomy wound site clean and dry mediastinal chest tubes in place with serosanguineous output  Pulmonary:      Effort: Pulmonary effort is normal. No respiratory distress.      Breath sounds: Normal breath sounds. No wheezing, rhonchi or rales.   Abdominal:      General: Abdomen is flat. Bowel sounds are normal. There is no distension.       Palpations: Abdomen is soft.      Tenderness: There is no abdominal tenderness. There is no guarding or rebound.   Genitourinary:     Comments: deferred  Musculoskeletal:         General: No swelling or deformity. Normal range of motion.      Cervical back: Normal range of motion and neck supple. No rigidity or tenderness.   Lymphadenopathy:      Cervical: No cervical adenopathy.   Skin:     General: Skin is warm and dry.      Capillary Refill: Capillary refill takes less than 2 seconds.      Coloration: Skin is not jaundiced.      Findings: No bruising, lesion or rash.   Neurological:      General: No focal deficit present.      Mental Status: He is alert.      Sensory: No sensory deficit.      Motor: No weakness.   Psychiatric:         Mood and Affect: Mood normal.           Ventilator Settings  Vent Mode: CPAP / PSV (01/31/24 1107)  Set Rate: 20 BPM (01/31/24 1039)  Vt Set: 500 mL (01/31/24 1039)  Pressure Support: 10 cmH20 (01/31/24 1107)  PEEP/CPAP: 5 cmH20 (01/31/24 1107)  Oxygen Concentration (%): 35 (01/31/24 1107)  Peak Airway Pressure: 17 cmH20 (01/31/24 1039)  Total Ve: 8.7 L/m (01/31/24 1039)      Laboratory Studies:   Recent Labs   Lab 01/31/24  1010 01/31/24  1137   PH 7.307* 7.310*   PCO2 44.8 43.0   PO2 252* 79.0*   HCO3 22.4* 21.7*   POCSATURATED 100  --    BE -4*  --      Recent Labs   Lab 02/01/24 0419   WBC 12.06*   RBC 3.30*   HGB 9.9*   HCT 30.1*   PLT 93*   MCV 91.2   MCH 30.0   MCHC 32.9*     Recent Labs   Lab 02/01/24 0419   GLUCOSE 129*      K 4.4   CO2 21*   BUN 14.9   CREATININE 1.32*   CALCIUM 7.9*         Microbiology Data:   Microbiology Results (last 7 days)       ** No results found for the last 168 hours. **              Imaging:   X-Ray Chest AP Portable  Narrative: EXAMINATION:  XR CHEST AP PORTABLE    CLINICAL HISTORY:  Post-op;    TECHNIQUE:  Frontal view(s) of the chest.    COMPARISON:  Radiography 01/31/2024    FINDINGS:  Removal of the endotracheal and enteric tubes.   Minimal bilateral lower lung atelectasis.  No pneumothorax or significant pleural fluid.  Cardiac silhouette upper normal in size.  Impression: Endotracheal and enteric tube removal.  Minimal atelectasis.    Electronically signed by: Dilshad Scanlon  Date:    02/01/2024  Time:    06:10          Assessment and Plan    Assessment:  -postop day 1 status post three-vessel CABG          Plan:  -discontinue lines tubes medications per CV surgery protocol   -okay to transfer out of ICU level care            Josh Solano MD  2/1/2024  Pulmonology/Critical Care

## 2024-02-01 NOTE — PROGRESS NOTES
Pod 1  In chair  Doing well  Vss  Heart sinus  Wounds c/d/I  Hct 30  Cr 1.32  Ct draining  transfer

## 2024-02-01 NOTE — PLAN OF CARE
02/01/24 0936   Discharge Assessment   Assessment Type Discharge Planning Assessment   Confirmed/corrected address, phone number and insurance Yes   Confirmed Demographics Correct on Facesheet   Source of Information patient   When was your last doctors appointment? 08/22/23   Communicated UNIQUE with patient/caregiver Date not available/Unable to determine   Reason For Admission post CABG   People in Home spouse   Facility Arrived From: home   Do you expect to return to your current living situation? Yes   Do you have help at home or someone to help you manage your care at home? Yes   Who are your caregiver(s) and their phone number(s)? Kaylen braden [phone]   Prior to hospitilization cognitive status: Alert/Oriented   Current cognitive status: Alert/Oriented   Walking or Climbing Stairs Difficulty no   Equipment Currently Used at Home none   Patient currently being followed by outpatient case management? No   Do you currently have service(s) that help you manage your care at home? No   Do you take prescription medications? Yes   Do you have prescription coverage? Yes   Coverage BCBS   Do you have any problems affording any of your prescribed medications? No   Is the patient taking medications as prescribed? yes   Who is going to help you get home at discharge? Kaylen braden   How do you get to doctors appointments? car, drives self   Are you on dialysis? No   Do you take coumadin? No   Discharge Plan A Home Health   Discharge Plan B Home Health   DME Needed Upon Discharge  none   Discharge Plan discussed with: Patient   Transition of Care Barriers None   Housing Stability   In the last 12 months, was there a time when you were not able to pay the mortgage or rent on time? N   In the last 12 months, was there a time when you did not have a steady place to sleep or slept in a shelter (including now)? N   Transportation Needs   In the past 12 months, has lack of transportation kept you from medical appointments or  from getting medications? no   In the past 12 months, has lack of transportation kept you from meetings, work, or from getting things needed for daily living? No   Food Insecurity   Within the past 12 months, you worried that your food would run out before you got the money to buy more. Never true   Within the past 12 months, the food you bought just didn't last and you didn't have money to get more. Never true   Social Connections   Are you , , , , never , or living with a partner?    OTHER   Name(s) of People in Home wife, Kaylen Rock     Patient was working prior to surgery. Lives with wife. Discussed home health, [facility] and offered other choices. Patient chose [facility] as rec from surgeon. Sent referral to [facility] via fax..

## 2024-02-01 NOTE — PROGRESS NOTES
02/01/24 1300   Pre Exercise Vitals   /63   Pulse 82   Supplemental O2? Yes   O2 Device nasal cannula   O2 Flow (L/min) 2   SpO2 94 %   During Exercise Vitals   Pulse 85   Supplemental O2? Yes   O2 Device nasal cannula   O2 Flow (L/min) 2   Distance Walked 100 feet   Post Exercise Vitals   /71   Pulse 79   Supplemental O2? Yes   O2 Device nasal cannula   O2 Flow (L/min) 2   SpO2 100 %   Modality   Modality Walker     Communicated with nurse pre and post walk; minimal assist to stand; maintains sternal precautions; walked 100 feet, O2 sat @95% on 2L; assisted back to chair; all monitors and drains reconnected; encouraged use of IS

## 2024-02-01 NOTE — PT/OT/SLP EVAL
"Physical Therapy Evaluation and Discharge Note    Patient Name:  Radu Rock   MRN:  [id number]    Recommendations:     Discharge therapy intensity: Low Intensity Therapy   Discharge Equipment Recommendations: rollator   Barriers to discharge: None    Assessment:     Radu Rock is a 64 y.o. male admitted with a medical diagnosis of s/p CABG x3. Hx of TAY and liver transplant. .  At this time, patient is functioning at their prior level of function and does not require further acute PT services. Pt is mobilizing CGA-SBA. Defer further mobility to cardiac rehab staff. Pt has good support at d/c, wife is able to stay home with him. No acute PT need identified. Educated pt on sternal precautions.    Recent Surgery: Procedure(s) (LRB):  CORONARY ARTERY BYPASS GRAFT (CABG) (N/A)  SURGICAL PROCUREMENT, VEIN, ENDOSCOPIC (Bilateral) 1 Day Post-Op    Plan:     During this hospitalization, patient does not require further acute PT services.  Please re-consult if situation changes.      Subjective     Chief Complaint: "feeling dopey"  Patient/Family Comments/goals: to go home  Pain/Comfort:  Pain Rating 1: 0/10    Patients cultural, spiritual, Druze conflicts given the current situation: no    Living Environment:  Lives with wife, no steps to enter.   Prior to admission, patients level of function was independent and working as a drilling consultant.  Equipment used at home: none.  DME owned (not currently used): none.  Upon discharge, patient will have assistance from wife.    Objective:     Communicated with nurse prior to session.  Patient found up in chair with chest tube, oxygen, blood pressure cuff, telemetry, pulse ox (continuous) upon PT entry to room.    General Precautions: Standard, sternal    Orthopedic Precautions:    Braces: N/A  Respiratory Status: Nasal cannula, flow 2 L/min  Blood Pressure: 97/59 before mobility. 124/75 after ambulation.   SpO2 94% after ambulation, 81 HR    Exams:  Cognitive " Exam:  Patient is oriented to Person, Place, Time, and Situation  RLE ROM: WNL  RLE Strength: WNL  LLE ROM: WNL  LLE Strength: WNL    Functional Mobility:  Transfers:     Sit to Stand:  contact guard assistance with rolling walker  Gait: 100ft with RW and SBA, slow pace, no LOB.     AM-PAC 6 CLICK MOBILITY  Total Score:20       Treatment and Education:    Patient provided with verbal education education regarding post-op precautions and discharge/DME recommendations.  Understanding was verbalized.     Patient left up in chair with all lines intact, call button in reach, and nurse present.    GOALS:   Multidisciplinary Problems       Physical Therapy Goals       Not on file                    History:     Past Medical History:   Diagnosis Date    CAD (coronary artery disease)     Cirrhosis 2016    liver transplant    Coronary artery disease of native artery of native heart with stable angina pectoris     Depression     Fatigue     H/O liver cancer     HCV (hepatitis C virus)     liver transplant    Heart attack 01/2023    Hyperlipidemia     Hypertension     Obesity     Osteoporosis     Sleep apnea     non use of equipment       Past Surgical History:   Procedure Laterality Date    APPENDECTOMY      CHOLECYSTECTOMY      laprascopic    COLONOSCOPY      CORONARY ARTERY BYPASS GRAFT (CABG) N/A 1/31/2024    Procedure: CORONARY ARTERY BYPASS GRAFT (CABG);  Surgeon: Juventino Ferrell IV, MD;  Location: [facility];  Service: Cardiovascular;  Laterality: N/A;    CORONARY STENT PLACEMENT      ENDOSCOPIC HARVEST OF VEIN Bilateral 1/31/2024    Procedure: SURGICAL PROCUREMENT, VEIN, ENDOSCOPIC;  Surgeon: Juventino Ferrell IV, MD;  Location: [facility] OR;  Service: Cardiovascular;  Laterality: Bilateral;    FRACTURE SURGERY Left     LEG    LEFT HEART CATHETERIZATION N/A 01/22/2024    Procedure: Left heart cath;  Surgeon: Frank Carlos MD;  Location: [facility] CATH LAB;  Service: Cardiology;  Laterality: N/A;    LIVER TRANSPLANT  2016     SHOULDER SURGERY Right     UPPER GASTROINTESTINAL ENDOSCOPY         Time Tracking:     PT Received On: 02/01/24  PT Start Time: 1410     PT Stop Time: 1428  PT Total Time (min): 18 min     Billable Minutes: Evaluation 18 02/01/2024

## 2024-02-02 LAB
ALBUMIN SERPL-MCNC: 3.3 G/DL (ref 3.4–4.8)
ALBUMIN/GLOB SERPL: 1.7 RATIO (ref 1.1–2)
ALP SERPL-CCNC: 45 UNIT/L (ref 40–150)
ALT SERPL-CCNC: 21 UNIT/L (ref 0–55)
AST SERPL-CCNC: 33 UNIT/L (ref 5–34)
BILIRUB SERPL-MCNC: 1.1 MG/DL
BUN SERPL-MCNC: 19.7 MG/DL (ref 8.4–25.7)
CALCIUM SERPL-MCNC: 7.6 MG/DL (ref 8.8–10)
CHLORIDE SERPL-SCNC: 102 MMOL/L (ref 98–107)
CO2 SERPL-SCNC: 23 MMOL/L (ref 23–31)
CREAT SERPL-MCNC: 1.75 MG/DL (ref 0.73–1.18)
ERYTHROCYTE [DISTWIDTH] IN BLOOD BY AUTOMATED COUNT: 14.9 % (ref 11.5–17)
GFR SERPLBLD CREATININE-BSD FMLA CKD-EPI: 42 MLS/MIN/1.73/M2
GLOBULIN SER-MCNC: 2 GM/DL (ref 2.4–3.5)
GLUCOSE SERPL-MCNC: 114 MG/DL (ref 82–115)
HCT VFR BLD AUTO: 28.3 % (ref 42–52)
HGB BLD-MCNC: 8.9 G/DL (ref 14–18)
MAGNESIUM SERPL-MCNC: 2.1 MG/DL (ref 1.6–2.6)
MCH RBC QN AUTO: 30.1 PG (ref 27–31)
MCHC RBC AUTO-ENTMCNC: 31.4 G/DL (ref 33–36)
MCV RBC AUTO: 95.6 FL (ref 80–94)
NRBC BLD AUTO-RTO: 0 %
PLATELET # BLD AUTO: 82 X10(3)/MCL (ref 130–400)
PLATELETS.RETICULATED NFR BLD AUTO: 17.1 % (ref 0.9–11.2)
PMV BLD AUTO: 13 FL (ref 7.4–10.4)
POTASSIUM SERPL-SCNC: 4.6 MMOL/L (ref 3.5–5.1)
PROT SERPL-MCNC: 5.3 GM/DL (ref 5.8–7.6)
RBC # BLD AUTO: 2.96 X10(6)/MCL (ref 4.7–6.1)
SODIUM SERPL-SCNC: 136 MMOL/L (ref 136–145)
WBC # SPEC AUTO: 14.67 X10(3)/MCL (ref 4.5–11.5)

## 2024-02-02 PROCEDURE — 83735 ASSAY OF MAGNESIUM: CPT | Performed by: NURSE PRACTITIONER

## 2024-02-02 PROCEDURE — 25000003 PHARM REV CODE 250: Performed by: PHYSICIAN ASSISTANT

## 2024-02-02 PROCEDURE — 94760 N-INVAS EAR/PLS OXIMETRY 1: CPT

## 2024-02-02 PROCEDURE — 94799 UNLISTED PULMONARY SVC/PX: CPT

## 2024-02-02 PROCEDURE — 80053 COMPREHEN METABOLIC PANEL: CPT | Performed by: PHYSICIAN ASSISTANT

## 2024-02-02 PROCEDURE — 11000001 HC ACUTE MED/SURG PRIVATE ROOM

## 2024-02-02 PROCEDURE — 63600175 PHARM REV CODE 636 W HCPCS

## 2024-02-02 PROCEDURE — 63600175 PHARM REV CODE 636 W HCPCS: Performed by: INTERNAL MEDICINE

## 2024-02-02 PROCEDURE — 97110 THERAPEUTIC EXERCISES: CPT

## 2024-02-02 PROCEDURE — 25000003 PHARM REV CODE 250: Performed by: NURSE PRACTITIONER

## 2024-02-02 PROCEDURE — 27000221 HC OXYGEN, UP TO 24 HOURS

## 2024-02-02 PROCEDURE — 93010 ELECTROCARDIOGRAM REPORT: CPT | Mod: ,,, | Performed by: INTERNAL MEDICINE

## 2024-02-02 PROCEDURE — 94761 N-INVAS EAR/PLS OXIMETRY MLT: CPT

## 2024-02-02 PROCEDURE — 63600175 PHARM REV CODE 636 W HCPCS: Performed by: PHYSICIAN ASSISTANT

## 2024-02-02 PROCEDURE — 99900035 HC TECH TIME PER 15 MIN (STAT)

## 2024-02-02 PROCEDURE — 21400001 HC TELEMETRY ROOM

## 2024-02-02 PROCEDURE — 93005 ELECTROCARDIOGRAM TRACING: CPT

## 2024-02-02 PROCEDURE — 85027 COMPLETE CBC AUTOMATED: CPT | Performed by: PHYSICIAN ASSISTANT

## 2024-02-02 PROCEDURE — 25000003 PHARM REV CODE 250: Performed by: SPECIALIST

## 2024-02-02 PROCEDURE — 63600175 PHARM REV CODE 636 W HCPCS: Performed by: NURSE PRACTITIONER

## 2024-02-02 PROCEDURE — 25000003 PHARM REV CODE 250

## 2024-02-02 PROCEDURE — 99024 POSTOP FOLLOW-UP VISIT: CPT | Mod: POP,,, | Performed by: PHYSICIAN ASSISTANT

## 2024-02-02 RX ORDER — FUROSEMIDE 10 MG/ML
40 INJECTION INTRAMUSCULAR; INTRAVENOUS EVERY 12 HOURS
Status: DISCONTINUED | OUTPATIENT
Start: 2024-02-02 | End: 2024-02-04

## 2024-02-02 RX ADMIN — DOCUSATE SODIUM 100 MG: 100 CAPSULE, LIQUID FILLED ORAL at 08:02

## 2024-02-02 RX ADMIN — FUROSEMIDE 40 MG: 10 INJECTION, SOLUTION INTRAVENOUS at 10:02

## 2024-02-02 RX ADMIN — SUCRALFATE 1 G: 1 TABLET ORAL at 05:02

## 2024-02-02 RX ADMIN — FOLIC ACID 1 MG: 1 TABLET ORAL at 08:02

## 2024-02-02 RX ADMIN — MUPIROCIN: 20 OINTMENT TOPICAL at 08:02

## 2024-02-02 RX ADMIN — OXYCODONE HYDROCHLORIDE 10 MG: 5 TABLET ORAL at 03:02

## 2024-02-02 RX ADMIN — ENOXAPARIN SODIUM 40 MG: 40 INJECTION SUBCUTANEOUS at 05:02

## 2024-02-02 RX ADMIN — SUCRALFATE 1 G: 1 TABLET ORAL at 08:02

## 2024-02-02 RX ADMIN — TACROLIMUS 1 MG: 1 CAPSULE ORAL at 08:02

## 2024-02-02 RX ADMIN — FUROSEMIDE 40 MG: 10 INJECTION, SOLUTION INTRAVENOUS at 08:02

## 2024-02-02 RX ADMIN — OXYCODONE HYDROCHLORIDE 10 MG: 5 TABLET ORAL at 02:02

## 2024-02-02 RX ADMIN — ASPIRIN 81 MG: 81 TABLET, COATED ORAL at 08:02

## 2024-02-02 RX ADMIN — FAMOTIDINE 20 MG: 20 TABLET, FILM COATED ORAL at 08:02

## 2024-02-02 RX ADMIN — OXYCODONE HYDROCHLORIDE 10 MG: 5 TABLET ORAL at 08:02

## 2024-02-02 RX ADMIN — METOPROLOL TARTRATE 12.5 MG: 25 TABLET, FILM COATED ORAL at 08:02

## 2024-02-02 RX ADMIN — ATORVASTATIN CALCIUM 20 MG: 10 TABLET, FILM COATED ORAL at 08:02

## 2024-02-02 RX ADMIN — SUCRALFATE 1 G: 1 TABLET ORAL at 10:02

## 2024-02-02 RX ADMIN — TACROLIMUS 0.5 MG: 0.5 CAPSULE ORAL at 05:02

## 2024-02-02 RX ADMIN — ESCITALOPRAM OXALATE 10 MG: 10 TABLET ORAL at 08:02

## 2024-02-02 NOTE — NURSING
Nurses Note -- 4 Eyes      2/2/2024   12:38 AM      Skin assessed during: Daily Assessment      [x] No Altered Skin Integrity Present    [x]Prevention Measures Documented      [] Yes- Altered Skin Integrity Present or Discovered   [] LDA Added if Not in Epic (Describe Wound)   [] New Altered Skin Integrity was Present on Admit and Documented in LDA   [] Wound Image Taken    Wound Care Consulted? No    Attending Nurse:  Montana Sow RN/Staff Member:   Triston Rubio

## 2024-02-02 NOTE — PROGRESS NOTES
02/02/24 1300   Pre Exercise Vitals   /71   Pulse 87   Supplemental O2? Yes   O2 Device nasal cannula w/ reservoir (i.e. oximizer)   O2 Flow (L/min) 10   SpO2 94 %   During Exercise Vitals   Pulse 92   Supplemental O2? Yes   O2 Device nasal cannula w/ reservoir (i.e. oximizer)   O2 Flow (L/min) 10   SpO2 90 %   Distance Walked 150 feet   Post Exercise Vitals   /76   Pulse 90   Supplemental O2? Yes   O2 Device nasal cannula w/ reservoir (i.e. oximizer)   O2 Flow (L/min) 10   SpO2 94 %   Modality   Modality Walker     Communicated with nurse pre and post walk; minimal assist to stand; sternal precautions maintained; walked 150 feet, O2 sat @90% on 10L oximizer; assisted back to chair; all monitors reconnected; encouraged use of IS; wife @bedside

## 2024-02-02 NOTE — NURSING
Nurses Note -- 4 Eyes      2/2/2024   4:54 PM      Skin assessed during: Daily Assessment      [x] No Altered Skin Integrity Present    [x]Prevention Measures Documented      [] Yes- Altered Skin Integrity Present or Discovered   [] LDA Added if Not in Epic (Describe Wound)   [] New Altered Skin Integrity was Present on Admit and Documented in LDA   [] Wound Image Taken    Wound Care Consulted? No    Attending Nurse:  Anaid Sow RN/Staff Member:   CRISSY Rodriguez

## 2024-02-02 NOTE — PROGRESS NOTES
"  [facility]    Cardiology  Progress Note    Patient Name: Radu Rock  MRN: [id number]  Admission Date: 1/31/2024  Hospital Length of Stay: 2 days  Code Status: Full Code   Attending Provider: Juventino Ferrell IV, MD   Consulting Provider: FATOU Muse  Primary Care Physician: Mariella Huntley MD  Principal Problem:<principal problem not specified>    Patient information was obtained from patient, past medical records, and ER records.     Subjective:     Chief Complaint: Reason for Consult: Post CABG     HPI: Mr. Rock is a 65 y/o male who is known to [facility], Dr. Carlos. The patient presented to [facility] on 1.31.24 for a Planned CABG with CV Surgery/Dr. Ferrell. He recently underwent a workup with Nuclear PET which was abnormal, prompting a C with noted MVCAD. He was referred to CV Surgery for CABG Evaluation and she was deemed a candidate for CABG. On 1.31.24 he underwent a CABG with results of LIMA to LAD, rSVG to OM, rSVG to PDA and Bilateral EVH. He tolerated the procedure and was admitted for observation in the ICU and further management. [facility] has been consulted for Post Operative Medical Management.     2.1.24: NAD. "I am good." Sitting in Bedside Chair. Denies SOB and Palps. + CP/Incisional.   2.2.24: NAD. "I am a tad SOB." + SOB, + CP/Incisional. Denies Palps. SBP 90s.HR 100s.    PMH: HLD, HTN, MVCAD/CABG, Liver Transplant, Liver Transplant/Cirrhosis, Liver Cancer, HCV/Hepatitis C Virus, MI, Obesity, Osteoporosis, TAY/CPAP  PSH: Liver Transplant, CABG, Angiogram, Cholecystectomy, Appendectomy, LE Surgery, Shoulder Surgery, EGD  Family History: Denies Family History of Heart Disease  Social History: Denies Illicit Drug, ETOH and Tobacco Use    Previous Cardiac Diagnostics:   CABG 1.31.24:  LIMA to LAD, rSVG to OM, rSVG to PDA, Bilateral EVH.    C 1.22.24:  Procedures performed:  1. Ultrasound-guided right radial access  2. Coronary angiography  3. Left ventricular hemodynamics  4. " IFR interrogation  -LAD 0.82  -circumflex 0.88  -PDA 0.91  5. LV EDP-19  6. Lima-angio  Findings:  1. Left main-normal  2. Lad-60-70% lad mid ISR involving large diagonal, IFR positive, 0.82 in LAD  3. LCX-non dominant, 80% proximal lesion extending into OM2, OM1 70% ostial lesion  4. RCA-dominant, 60% proximal lesion, 80% ostial PDA  5. LIMA patent  Plan:  1. Maximize medical management  2. Consult CTS for bypass evaluation.  If patient is not a candidate will need Laser atherectomy of LAD stent and PCI of Mid LAD, Circumlex, and possibly PDA.  Would re-evalaute PDA at time of PCI, but I think it is bad enough for intervention.  Inferoapical walls are involved on stress test.    PET 1.19.24:  This is an abnormal perfusion study.   This scan is suggestive of moderate to high risk for future cardiovascular events.   Small partially reversible perfusion abnormality of severe intensity in the apical segment. Small reversible perfusion abnormality of moderate intensity in the apical anterior segment. Small partially reversible perfusion abnormality of severe intensity in the apical septal segment. Small partially reversible perfusion abnormality of severe intensity in the apical inferior segment.   The left ventricular cavity is noted to be normal on the stress studies. The stress left ventricular ejection fraction was calculated to be 64% and left ventricular global function is normal. The rest left ventricular cavity is noted to be normal. The rest left ventricular ejection fraction was calculated to be 64% and rest left ventricular global function is normal.   Compared with rest and stress studies the ejection fraction remains unchanged (64).   The study quality is average.   There was a rise in myocardial blood flow between rest and stress.  Global myocardial blood flow reserve was 3.01.  Normal global coronary flow reserve is suggestive of low coronary event risk.    ECHO 1.10.24:  The left ventricle is normal in  size with moderate to severe, asymmetric septal left ventricular hypertrophy and mild, asymmetric posterior left ventricular hypertrophy and normal left ventricular systolic function. The left ventricular ejection fraction is 65%. Left ventricular diastolic function is normal. Bradycardia is noted.  Mild dilatation of the aortic root is noted at 3.9 cms. The aortic arch is normal at 3.1 cms.   Unable to adequately visualize and assess the pulmonary artery.  Poor short axis echo windows.  The study quality is average.     Review of patient's allergies indicates:  No Known Allergies    No current facility-administered medications on file prior to encounter.     Current Outpatient Medications on File Prior to Encounter   Medication Sig    alendronate (FOSAMAX) 35 MG tablet Take 35 mg by mouth every 7 days. Mondays    amLODIPine (NORVASC) 5 MG tablet Take 5 mg by mouth nightly.    aspirin (ECOTRIN) 81 MG EC tablet Take 81 mg by mouth once.    atorvastatin (LIPITOR) 20 MG tablet Take 20 mg by mouth every evening.    EScitalopram oxalate (LEXAPRO) 10 MG tablet Take 10 mg by mouth every morning.    magnesium oxide (MAG-OX) 400 mg (241.3 mg magnesium) tablet Take 400 mg by mouth 2 (two) times daily.    metoprolol tartrate (LOPRESSOR) 25 MG tablet Take 25 mg by mouth 2 (two) times daily.    olmesartan (BENICAR) 20 MG tablet Take 20 mg by mouth once daily.    rifAXIMin (XIFAXAN) 550 mg Tab Take 550 mg by mouth 2 (two) times daily.    tacrolimus (PROGRAF) 1 MG Cap Take 1 mg by mouth. 1 mg in the am and 0.5mg in the pm    cholecalciferol, vitamin D3, 1,250 mcg (50,000 unit) capsule Take 50,000 Units by mouth every 7 days. Monday    clopidogreL (PLAVIX) 75 mg tablet Take 75 mg by mouth once daily.     Review of Systems   Constitutional:  Positive for fatigue.   Respiratory:  Positive for shortness of breath.    Cardiovascular:  Positive for chest pain (Incisional). Negative for palpitations and leg swelling.   All other systems  reviewed and are negative.    Objective:     Vital Signs (Most Recent):  Temp: 98.2 °F (36.8 °C) (02/02/24 0750)  Pulse: 85 (02/02/24 1000)  Resp: 19 (02/02/24 1000)  BP: 108/71 (02/02/24 1000)  SpO2: (!) 87 % (02/02/24 1000) Vital Signs (24h Range):  Temp:  [98.2 °F (36.8 °C)-99 °F (37.2 °C)] 98.2 °F (36.8 °C)  Pulse:  [78-98] 85  Resp:  [14-31] 19  SpO2:  [86 %-94 %] 87 %  BP: ()/(61-76) 108/71     Weight: 108 kg (238 lb 1.6 oz)  Body mass index is 34.16 kg/m².    SpO2: (!) 87 %         Intake/Output Summary (Last 24 hours) at 2/2/2024 1115  Last data filed at 2/2/2024 0600  Gross per 24 hour   Intake 600 ml   Output 820 ml   Net -220 ml       Lines/Drains/Airways       Drain  Duration                  Chest Tube 01/31/24 0933 Mediastinal 24 Fr. 2 days              Peripheral Intravenous Line  Duration                  Peripheral IV - Single Lumen 01/31/24 0540 18 G Anterior;Distal;Left Forearm 2 days                  Significant Labs:  Recent Results (from the past 72 hour(s))   POCT glucose    Collection Time: 01/31/24  5:54 AM   Result Value Ref Range    POCT Glucose 96 70 - 110 mg/dL   RT Blood Gas    Collection Time: 01/31/24  6:47 AM   Result Value Ref Range    Sample Type Arterial Blood     Sample site Arterial Line     Drawn by JANAE RN     pH, Blood gas 7.380 7.350 - 7.450    pCO2, Blood gas 45.0 35.0 - 45.0 mmHg    pO2, Blood gas 71.0 (L) 80.0 - 100.0 mmHg    Sodium, Blood Gas 137 137 - 145 mmol/L    Potassium, Blood Gas 4.5 3.5 - 5.0 mmol/L    Calcium Level Ionized 1.17 1.12 - 1.23 mmol/L    TOC2, Blood gas 28.0 mmol/L    Base Excess, Blood gas 1.10 -2.00 - 2.00 mmol/L    sO2, Blood gas 93.7 %    HCO3, Blood gas 26.6 (H) 22.0 - 26.0 mmol/L    THb, Blood gas 12.8 12 - 16 g/dL    O2 Hb, Blood Gas 92.6 (L) 94.0 - 97.0 %    CO Hgb 0.3 (L) 0.5 - 1.5 %    Met Hgb 0.0 (L) 0.4 - 1.5 %    Allens Test N/A     FIO2, Blood gas 21 %   ISTAT PROCEDURE    Collection Time: 01/31/24  7:15 AM   Result Value Ref Range     POC PH 7.373 7.35 - 7.45    POC PCO2 43.5 35 - 45 mmHg    POC PO2 44 40 - 60 mmHg    POC HCO3 25.3 24 - 28 mmol/L    POC BE 0 -2 to 2 mmol/L    POC SATURATED O2 79 95 - 100 %    POC pH Temp 7.387     POC pCO2 Temp 41.7 mmHg    POC pO2 Temp 41 mmHg    POC Glucose 107 70 - 110 mg/dL    POC Sodium 139 136 - 145 mmol/L    POC Potassium 4.7 3.5 - 5.1 mmol/L    POC TCO2 27 24 - 29 mmol/L    POC Ionized Calcium 1.24 1.06 - 1.42 mmol/L    POC Hematocrit 36 36 - 54 %PCV    POC HEMOGLOBIN 12 g/dL    Sample VENOUS     FiO2 100     POC Temp 36.0 C    Basic Metabolic Panel    Collection Time: 01/31/24  7:58 AM   Result Value Ref Range    Sodium Level 145 136 - 145 mmol/L    Potassium Level 4.0 3.5 - 5.1 mmol/L    Chloride 114 (H) 98 - 107 mmol/L    Carbon Dioxide 21 (L) 23 - 31 mmol/L    Glucose Level 159 (H) 82 - 115 mg/dL    Blood Urea Nitrogen 18.7 8.4 - 25.7 mg/dL    Creatinine 1.13 0.73 - 1.18 mg/dL    BUN/Creatinine Ratio 17     Calcium Level Total 9.5 8.8 - 10.0 mg/dL    Anion Gap 10.0 mEq/L    eGFR >60 mls/min/1.73/m2   Protime-INR    Collection Time: 01/31/24  7:58 AM   Result Value Ref Range    PT 22.7 (H) 12.5 - 14.5 seconds    INR 2.0 (H) <=1.3   ISTAT PROCEDURE    Collection Time: 01/31/24  8:09 AM   Result Value Ref Range    POC PH 7.351 7.35 - 7.45    POC PCO2 45.2 (H) 35 - 45 mmHg    POC PO2 302 (HH) 40 - 60 mmHg    POC HCO3 25.0 24 - 28 mmol/L    POC BE -1 -2 to 2 mmol/L    POC SATURATED O2 100 95 - 100 %    POC pH Temp 7.365     POC pCO2 Temp 43.3 mmHg    POC pO2 Temp 298 mmHg    POC Glucose 168 (H) 70 - 110 mg/dL    POC Sodium 135 (L) 136 - 145 mmol/L    POC Potassium 5.3 (H) 3.5 - 5.1 mmol/L    POC TCO2 26 24 - 29 mmol/L    POC Ionized Calcium 1.19 1.06 - 1.42 mmol/L    POC Hematocrit 35 (L) 36 - 54 %PCV    POC HEMOGLOBIN 12 g/dL    Sample VENOUS     FiO2 100     POC Temp 36.0 C    ISTAT PROCEDURE    Collection Time: 01/31/24  8:36 AM   Result Value Ref Range    POC PH 7.316 (L) 7.35 - 7.45    POC PCO2  51.4 (H) 35 - 45 mmHg    POC PO2 233 (H) 80 - 100 mmHg    POC HCO3 26.3 24 - 28 mmol/L    POC BE 0 -2 to 2 mmol/L    POC SATURATED O2 100 95 - 100 %    POC pH Temp 7.330     POC pCO2 Temp 49.2 mmHg    POC pO2 Temp 228 mmHg    POC Glucose 179 (H) 70 - 110 mg/dL    POC Sodium 136 136 - 145 mmol/L    POC Potassium 7.2 (HH) 3.5 - 5.1 mmol/L    POC TCO2 28 (H) 23 - 27 mmol/L    POC Ionized Calcium 1.02 (L) 1.06 - 1.42 mmol/L    POC Hematocrit 26 (L) 36 - 54 %PCV    POC HEMOGLOBIN 9 g/dL    Sample ARTERIAL     FiO2 55    ISTAT PROCEDURE    Collection Time: 01/31/24  8:40 AM   Result Value Ref Range    POC PH 7.285 (LL) 7.35 - 7.45    POC PCO2 56.4 (H) 35 - 45 mmHg    POC PO2 38 (LL) 40 - 60 mmHg    POC HCO3 26.8 24 - 28 mmol/L    POC BE 0 -2 to 2 mmol/L    POC SATURATED O2 65 95 - 100 %    POC pH Temp 7.299     POC pCO2 Temp 53.9 mmHg    POC pO2 Temp 36 mmHg    POC Glucose 199 (H) 70 - 110 mg/dL    POC Sodium 137 136 - 145 mmol/L    POC Potassium 6.1 (H) 3.5 - 5.1 mmol/L    POC TCO2 28 24 - 29 mmol/L    POC Ionized Calcium 1.06 1.06 - 1.42 mmol/L    POC Hematocrit 27 (L) 36 - 54 %PCV    POC HEMOGLOBIN 9 g/dL    Sample VENOUS     FiO2 100     POC Temp 36.0 C    ISTAT PROCEDURE    Collection Time: 01/31/24  9:05 AM   Result Value Ref Range    POC PH 7.365 7.35 - 7.45    POC PCO2 43.0 35 - 45 mmHg    POC PO2 301 (H) 80 - 100 mmHg    POC HCO3 24.6 24 - 28 mmol/L    POC BE -1 -2 to 2 mmol/L    POC SATURATED O2 100 95 - 100 %    POC pH Temp 7.396     POC pCO2 Temp 39.1 mmHg    POC pO2 Temp 291 mmHg    POC Glucose 192 (H) 70 - 110 mg/dL    POC Sodium 140 136 - 145 mmol/L    POC Potassium 5.5 (H) 3.5 - 5.1 mmol/L    POC TCO2 26 23 - 27 mmol/L    POC Ionized Calcium 0.96 (L) 1.06 - 1.42 mmol/L    POC Hematocrit 26 (L) 36 - 54 %PCV    POC HEMOGLOBIN 9 g/dL    Sample ARTERIAL     FiO2 70     POC Temp 34.8 C    ISTAT PROCEDURE    Collection Time: 01/31/24  9:49 AM   Result Value Ref Range    POC PH 7.349 (L) 7.35 - 7.45    POC PCO2  42.1 35 - 45 mmHg    POC PO2 318 (HH) 40 - 60 mmHg    POC HCO3 23.2 (L) 24 - 28 mmol/L    POC BE -2 -2 to 2 mmol/L    POC SATURATED O2 100 95 - 100 %    POC pH Temp 7.363     POC pCO2 Temp 40.3 mmHg    POC pO2 Temp 313 mmHg    POC Glucose 169 (H) 70 - 110 mg/dL    POC Sodium 142 136 - 145 mmol/L    POC Potassium 4.8 3.5 - 5.1 mmol/L    POC TCO2 24 24 - 29 mmol/L    POC Ionized Calcium 1.38 1.06 - 1.42 mmol/L    POC Hematocrit 27 (L) 36 - 54 %PCV    POC HEMOGLOBIN 9 g/dL    Sample VENOUS     FiO2 100     POC Temp 36.0 C    ISTAT PROCEDURE    Collection Time: 01/31/24 10:10 AM   Result Value Ref Range    POC PH 7.307 (L) 7.35 - 7.45    POC PCO2 44.8 35 - 45 mmHg    POC PO2 252 (H) 80 - 100 mmHg    POC HCO3 22.4 (L) 24 - 28 mmol/L    POC BE -4 (L) -2 to 2 mmol/L    POC SATURATED O2 100 95 - 100 %    POC Glucose 159 (H) 70 - 110 mg/dL    POC Sodium 142 136 - 145 mmol/L    POC Potassium 4.0 3.5 - 5.1 mmol/L    POC TCO2 24 23 - 27 mmol/L    POC Ionized Calcium 1.43 (H) 1.06 - 1.42 mmol/L    POC Hematocrit 30 (L) 36 - 54 %PCV    POC HEMOGLOBIN 10 g/dL    Sample ARTERIAL    POCT glucose    Collection Time: 01/31/24 10:42 AM   Result Value Ref Range    POCT Glucose 147 (H) 70 - 110 mg/dL   Tacrolimus ()    Collection Time: 01/31/24 11:28 AM   Result Value Ref Range    Tacrolimus 2.8 (L) 5.0-15.0 (Trough) ng/mL   APTT    Collection Time: 01/31/24 11:28 AM   Result Value Ref Range    PTT 27.0 23.2 - 33.7 seconds   CBC with Differential    Collection Time: 01/31/24 11:28 AM   Result Value Ref Range    WBC 22.69 (H) 4.50 - 11.50 x10(3)/mcL    RBC 3.67 (L) 4.70 - 6.10 x10(6)/mcL    Hgb 11.0 (L) 14.0 - 18.0 g/dL    Hct 33.9 (L) 42.0 - 52.0 %    MCV 92.4 80.0 - 94.0 fL    MCH 30.0 27.0 - 31.0 pg    MCHC 32.4 (L) 33.0 - 36.0 g/dL    RDW 14.4 11.5 - 17.0 %    Platelet 102 (L) 130 - 400 x10(3)/mcL    MPV 12.7 (H) 7.4 - 10.4 fL    Neut % 72.8 %    Lymph % 17.8 %    Mono % 7.3 %    Eos % 1.1 %    Basophil % 0.4 %    Lymph # 4.05  0.6 - 4.6 x10(3)/mcL    Neut # 16.50 (H) 2.1 - 9.2 x10(3)/mcL    Mono # 1.65 (H) 0.1 - 1.3 x10(3)/mcL    Eos # 0.26 0 - 0.9 x10(3)/mcL    Baso # 0.09 <=0.2 x10(3)/mcL    IG# 0.14 (H) 0 - 0.04 x10(3)/mcL    IG% 0.6 %    NRBC% 0.0 %   RT Blood Gas    Collection Time: 01/31/24 11:37 AM   Result Value Ref Range    Sample Type Arterial Blood     Sample site Arterial Line     Drawn by sd rrt     pH, Blood gas 7.310 (L) 7.350 - 7.450    pCO2, Blood gas 43.0 35.0 - 45.0 mmHg    pO2, Blood gas 79.0 (L) 80.0 - 100.0 mmHg    Sodium, Blood Gas 141 137 - 145 mmol/L    Potassium, Blood Gas 3.6 3.5 - 5.0 mmol/L    Calcium Level Ionized 1.19 1.12 - 1.23 mmol/L    TOC2, Blood gas 23.0 mmol/L    Base Excess, Blood gas -4.50 mmol/L    sO2, Blood gas 94.0 %    HCO3, Blood gas 21.7 (L) 22.0 - 26.0 mmol/L    Allens Test N/A     MODE CPAP     Oxygen Device, Blood gas Ventilator     FIO2, Blood gas 35 %    CPAP 5 cm H2O    PS 10.0 cmH2O   POCT glucose    Collection Time: 01/31/24 11:37 AM   Result Value Ref Range    POCT Glucose 150 (H) 70 - 110 mg/dL   POCT glucose    Collection Time: 01/31/24 12:58 PM   Result Value Ref Range    POCT Glucose 131 (H) 70 - 110 mg/dL   POCT glucose    Collection Time: 01/31/24  3:06 PM   Result Value Ref Range    POCT Glucose 109 70 - 110 mg/dL   Protime-INR    Collection Time: 01/31/24  3:08 PM   Result Value Ref Range    PT 15.3 (H) 12.5 - 14.5 seconds    INR 1.2 <=1.3   Potassium    Collection Time: 01/31/24  3:08 PM   Result Value Ref Range    Potassium Level 4.1 3.5 - 5.1 mmol/L   Hemoglobin and Hematocrit    Collection Time: 01/31/24  3:08 PM   Result Value Ref Range    Hgb 10.2 (L) 14.0 - 18.0 g/dL    Hct 30.1 (L) 42.0 - 52.0 %   APTT    Collection Time: 01/31/24  3:08 PM   Result Value Ref Range    PTT 29.4 23.2 - 33.7 seconds   POCT glucose    Collection Time: 01/31/24  4:07 PM   Result Value Ref Range    POCT Glucose 108 70 - 110 mg/dL   Transesophageal echo (LALITA)    Collection Time: 01/31/24   4:50 PM   Result Value Ref Range    BSA 2.23 m2   POCT glucose    Collection Time: 01/31/24  5:15 PM   Result Value Ref Range    POCT Glucose 122 (H) 70 - 110 mg/dL   POCT glucose    Collection Time: 01/31/24  6:18 PM   Result Value Ref Range    POCT Glucose 117 (H) 70 - 110 mg/dL   POCT glucose    Collection Time: 01/31/24  7:49 PM   Result Value Ref Range    POCT Glucose 138 (H) 70 - 110 mg/dL   POCT glucose    Collection Time: 01/31/24 10:13 PM   Result Value Ref Range    POCT Glucose 129 (H) 70 - 110 mg/dL   POCT glucose    Collection Time: 02/01/24 12:02 AM   Result Value Ref Range    POCT Glucose 124 (H) 70 - 110 mg/dL   POCT glucose    Collection Time: 02/01/24  1:55 AM   Result Value Ref Range    POCT Glucose 129 (H) 70 - 110 mg/dL   POCT glucose    Collection Time: 02/01/24  4:18 AM   Result Value Ref Range    POCT Glucose 134 (H) 70 - 110 mg/dL   CBC Without Differential    Collection Time: 02/01/24  4:19 AM   Result Value Ref Range    WBC 12.06 (H) 4.50 - 11.50 x10(3)/mcL    RBC 3.30 (L) 4.70 - 6.10 x10(6)/mcL    Hgb 9.9 (L) 14.0 - 18.0 g/dL    Hct 30.1 (L) 42.0 - 52.0 %    MCV 91.2 80.0 - 94.0 fL    MCH 30.0 27.0 - 31.0 pg    MCHC 32.9 (L) 33.0 - 36.0 g/dL    RDW 14.6 11.5 - 17.0 %    Platelet 93 (L) 130 - 400 x10(3)/mcL    MPV 12.9 (H) 7.4 - 10.4 fL    NRBC% 0.0 %   Comprehensive Metabolic Panel    Collection Time: 02/01/24  4:19 AM   Result Value Ref Range    Sodium Level 137 136 - 145 mmol/L    Potassium Level 4.4 3.5 - 5.1 mmol/L    Chloride 109 (H) 98 - 107 mmol/L    Carbon Dioxide 21 (L) 23 - 31 mmol/L    Glucose Level 129 (H) 82 - 115 mg/dL    Blood Urea Nitrogen 14.9 8.4 - 25.7 mg/dL    Creatinine 1.32 (H) 0.73 - 1.18 mg/dL    Calcium Level Total 7.9 (L) 8.8 - 10.0 mg/dL    Protein Total 5.3 (L) 5.8 - 7.6 gm/dL    Albumin Level 3.3 (L) 3.4 - 4.8 g/dL    Globulin 2.0 (L) 2.4 - 3.5 gm/dL    Albumin/Globulin Ratio 1.7 1.1 - 2.0 ratio    Bilirubin Total 0.6 <=1.5 mg/dL    Alkaline Phosphatase 32 (L)  40 - 150 unit/L    Alanine Aminotransferase 25 0 - 55 unit/L    Aspartate Aminotransferase 28 5 - 34 unit/L    eGFR 59 mls/min/1.73/m2   POCT glucose    Collection Time: 02/01/24  6:09 AM   Result Value Ref Range    POCT Glucose 125 (H) 70 - 110 mg/dL   Magnesium    Collection Time: 02/02/24  2:17 AM   Result Value Ref Range    Magnesium Level 2.10 1.60 - 2.60 mg/dL   CBC Without Differential    Collection Time: 02/02/24  2:17 AM   Result Value Ref Range    WBC 14.67 (H) 4.50 - 11.50 x10(3)/mcL    RBC 2.96 (L) 4.70 - 6.10 x10(6)/mcL    Hgb 8.9 (L) 14.0 - 18.0 g/dL    Hct 28.3 (L) 42.0 - 52.0 %    MCV 95.6 (H) 80.0 - 94.0 fL    MCH 30.1 27.0 - 31.0 pg    MCHC 31.4 (L) 33.0 - 36.0 g/dL    RDW 14.9 11.5 - 17.0 %    Platelet 82 (L) 130 - 400 x10(3)/mcL    MPV 13.0 (H) 7.4 - 10.4 fL    IPF 17.1 (H) 0.9 - 11.2 %    NRBC% 0.0 %   Comprehensive Metabolic Panel    Collection Time: 02/02/24  2:17 AM   Result Value Ref Range    Sodium Level 136 136 - 145 mmol/L    Potassium Level 4.6 3.5 - 5.1 mmol/L    Chloride 102 98 - 107 mmol/L    Carbon Dioxide 23 23 - 31 mmol/L    Glucose Level 114 82 - 115 mg/dL    Blood Urea Nitrogen 19.7 8.4 - 25.7 mg/dL    Creatinine 1.75 (H) 0.73 - 1.18 mg/dL    Calcium Level Total 7.6 (L) 8.8 - 10.0 mg/dL    Protein Total 5.3 (L) 5.8 - 7.6 gm/dL    Albumin Level 3.3 (L) 3.4 - 4.8 g/dL    Globulin 2.0 (L) 2.4 - 3.5 gm/dL    Albumin/Globulin Ratio 1.7 1.1 - 2.0 ratio    Bilirubin Total 1.1 <=1.5 mg/dL    Alkaline Phosphatase 45 40 - 150 unit/L    Alanine Aminotransferase 21 0 - 55 unit/L    Aspartate Aminotransferase 33 5 - 34 unit/L    eGFR 42 mls/min/1.73/m2     EKG:       Telemetry: SR    Physical Exam  Constitutional:       General: He is not in acute distress.     Appearance: Normal appearance.   HENT:      Head: Normocephalic.      Mouth/Throat:      Mouth: Mucous membranes are moist.   Eyes:      Conjunctiva/sclera: Conjunctivae normal.   Cardiovascular:      Rate and Rhythm: Regular rhythm.  Tachycardia present.      Pulses: Normal pulses.      Heart sounds: Normal heart sounds. No murmur heard.  Pulmonary:      Effort: Pulmonary effort is normal.      Breath sounds: Rhonchi and rales present.      Comments: NC O2  Abdominal:      Palpations: Abdomen is soft.   Musculoskeletal:      Right lower leg: No edema.      Left lower leg: No edema.   Skin:     General: Skin is warm and dry.      Comments: Midline Sternotomy Dressing C/D/I. + CTs    Neurological:      General: No focal deficit present.      Mental Status: He is alert and oriented to person, place, and time. Mental status is at baseline.   Psychiatric:         Mood and Affect: Mood normal.         Behavior: Behavior normal.       Home Medications:   No current facility-administered medications on file prior to encounter.     Current Outpatient Medications on File Prior to Encounter   Medication Sig Dispense Refill    alendronate (FOSAMAX) 35 MG tablet Take 35 mg by mouth every 7 days. Mondays      amLODIPine (NORVASC) 5 MG tablet Take 5 mg by mouth nightly.      aspirin (ECOTRIN) 81 MG EC tablet Take 81 mg by mouth once.      atorvastatin (LIPITOR) 20 MG tablet Take 20 mg by mouth every evening.      EScitalopram oxalate (LEXAPRO) 10 MG tablet Take 10 mg by mouth every morning.      magnesium oxide (MAG-OX) 400 mg (241.3 mg magnesium) tablet Take 400 mg by mouth 2 (two) times daily.      metoprolol tartrate (LOPRESSOR) 25 MG tablet Take 25 mg by mouth 2 (two) times daily.      olmesartan (BENICAR) 20 MG tablet Take 20 mg by mouth once daily.      rifAXIMin (XIFAXAN) 550 mg Tab Take 550 mg by mouth 2 (two) times daily.      tacrolimus (PROGRAF) 1 MG Cap Take 1 mg by mouth. 1 mg in the am and 0.5mg in the pm      cholecalciferol, vitamin D3, 1,250 mcg (50,000 unit) capsule Take 50,000 Units by mouth every 7 days. Monday      clopidogreL (PLAVIX) 75 mg tablet Take 75 mg by mouth once daily.       Current Inpatient Medications:    Current  Facility-Administered Medications:     acetaminophen oral solution 650 mg, 650 mg, Per OG tube, Q6H PRN, Theron Morris PA-C    albumin human 5% bottle 12.5 g, 12.5 g, Intravenous, PRN, Theron Morris PA-C, Stopped at 01/31/24 1302    aspirin EC tablet 81 mg, 81 mg, Oral, Daily, Theron Morris PA-C, 81 mg at 02/02/24 0806    atorvastatin tablet 20 mg, 20 mg, Oral, QHS, Johan Ball ANP, 20 mg at 02/01/24 2003    calcium gluconate 1 g in NS IVPB (premixed), 1 g, Intravenous, PRN, Theron Morris PA-C    calcium gluconate 1 g in NS IVPB (premixed), 2 g, Intravenous, PRN, Theron Morris PA-C    calcium gluconate 1 g in NS IVPB (premixed), 3 g, Intravenous, PRN, Theron Morris PA-C    dextrose 10% bolus 125 mL 125 mL, 12.5 g, Intravenous, PRN, Theron Morris PA-C    dextrose 10% bolus 250 mL 250 mL, 25 g, Intravenous, PRN, Theron Morris PA-C    docusate sodium capsule 100 mg, 100 mg, Oral, BID, Theron Morris PA-C, 100 mg at 02/02/24 0806    enoxaparin injection 40 mg, 40 mg, Subcutaneous, Daily, Theron Morris PA-C, 40 mg at 02/01/24 1635    EScitalopram oxalate tablet 10 mg, 10 mg, Oral, Daily, Juventino Ferrell IV, MD, 10 mg at 02/02/24 0806    famotidine tablet 20 mg, 20 mg, Oral, BID, Kristie Cook MD, 20 mg at 02/02/24 0806    folic acid tablet 1 mg, 1 mg, Oral, Daily, Theron Morris PA-C, 1 mg at 02/02/24 0806    furosemide injection 40 mg, 40 mg, Intravenous, Q12H, Johan Ball ANP, 40 mg at 02/02/24 1040    HYDROcodone-acetaminophen 5-325 mg per tablet 1 tablet, 1 tablet, Oral, Q4H PRN, Theron Morris PA-C, 1 tablet at 02/01/24 0412    ketorolac injection 30 mg, 30 mg, Intravenous, Q6H PRN, Josh Solano MD, 30 mg at 02/01/24 1724    lactulose 10 gram/15 ml solution 20 g, 20 g, Oral, Q6H PRN, Theron Morris PA-C    loperamide capsule 2 mg, 2 mg, Oral, Continuous PRN, Theron Morris PA-C    magnesium sulfate 2g in water 50mL IVPB (premix), 2 g, Intravenous,  PRN, Theron Morris PA-C    magnesium sulfate 2g in water 50mL IVPB (premix), 4 g, Intravenous, PRN, Theron Morris PA-C    metoclopramide injection 5 mg, 5 mg, Intravenous, Q6H PRN, Theron Morris PA-C    morphine injection 4 mg, 4 mg, Intravenous, Q4H PRN, Theron Morris PA-C, 4 mg at 01/31/24 1051    mupirocin 2 % ointment, , Nasal, BID, Theron Morris PA-C, Given at 02/02/24 0806    ondansetron injection 4 mg, 4 mg, Intravenous, Q4H PRN, Theron Morris PA-C, 4 mg at 01/31/24 2044    oxyCODONE immediate release tablet 5 mg, 5 mg, Oral, Q6H PRN, Fernando Carpenter PA, 5 mg at 02/01/24 0818    oxyCODONE immediate release tablet Tab 10 mg, 10 mg, Oral, Q4H PRN, Theron Morris PA-EDWAR, 10 mg at 02/02/24 0355    potassium chloride 20 mEq in 100 mL IVPB (FOR CENTRAL LINE ADMINISTRATION ONLY), 20 mEq, Intravenous, PRN, Theron Morris PA-C    potassium chloride 20 mEq in 100 mL IVPB (FOR CENTRAL LINE ADMINISTRATION ONLY), 40 mEq, Intravenous, PRN, Theron Morris PA-C    potassium chloride 20 mEq in 100 mL IVPB (FOR CENTRAL LINE ADMINISTRATION ONLY), 60 mEq, Intravenous, PRN, Theron Morris PA-C    sodium phosphate 15 mmol in dextrose 5 % (D5W) 250 mL IVPB, 15 mmol, Intravenous, PRN, Theron Morris PA-C    sodium phosphate 20.01 mmol in dextrose 5 % (D5W) 250 mL IVPB, 20.01 mmol, Intravenous, PRN, Theron Morris PA-EDWAR    sodium phosphate 30 mmol in dextrose 5 % (D5W) 250 mL IVPB, 30 mmol, Intravenous, PRN, Theron Morris PA-C    sucralfate tablet 1 g, 1 g, Oral, QID (AC & HS), Theron Morris PA-C, 1 g at 02/02/24 1040    tacrolimus capsule 0.5 mg, 0.5 mg, Oral, Daily PM, Jacklyn Sam FNP, 0.5 mg at 02/01/24 1955    tacrolimus capsule 1 mg, 1 mg, Oral, Daily AM, Josh Solano MD, 1 mg at 02/02/24 0808    VTE Risk Mitigation (From admission, onward)           Ordered     enoxaparin injection 40 mg  Daily         01/31/24 1008     Place sequential compression device  Until  discontinued         01/31/24 1008     IP VTE HIGH RISK PATIENT  Once         01/31/24 0840                  Assessment:   MVCAD    - CABG (1.31.24) - LIMA to LAD, rSVG to OM, rSVG to PDA, Bilateral EVH    - LHC (1.22.24) - 1. Left main-normal 2. Lad-60-70% lad mid ISR involving large diagonal, IFR positive, 0.82 in LAD 3. LCX-non dominant, 80% proximal lesion extending into OM2, OM1 70% ostial lesion 4. RCA-dominant, 60% proximal lesion, 80% ostial PDA 5. LIMA patent    - ECHO 1.10.24 - LVEF 65%  Acute on Chronic Diastolic HF/EF 65%     - CXR (2.2.24) - Pulmonary Vascular Congestion   BRIAN   HTN - Borderline BP   HLD  Cirrhosis/Liver Cancer/HCV s/p Liver Transplant  Anemia   Hx of MI  Obesity  Osteoporosis  TAY/CPAP  Leukocytosis   Thrombocytopenia   No Hx of GIB     Plan:   Continue ASA and Statin  Hx of Liver Transplant/Cautious Use of Statin - Monitor LFTs  D/C BB to Give Room for Diuresis/Will Add GDMT Back when BP Allows  Lasix 40mg IVP Q12HRs  Accurate I&Os and Daily Weights   Aggressive Mobilization of PT and Q1HR IS  Labs and EKG in AM: CBC, CMP and Mg    FATOU uMse  Cardiology  [facility]  02/02/2024     I have seen the patient, reviewed the Nurse Practitioner's note, assessment and plan. I have personally interviewed and examined the patient at bedside and agree with the findings. Medical decision making listed above were done under my guidance.

## 2024-02-02 NOTE — PROGRESS NOTES
Pod 2  In chair, sob when ambulating  Doing well  Vss  Heart sinus  Wounds c/d/I  Ct draining  Waterseal, diurese

## 2024-02-03 LAB
ALBUMIN SERPL-MCNC: 3.1 G/DL (ref 3.4–4.8)
ALBUMIN/GLOB SERPL: 0.9 RATIO (ref 1.1–2)
ALP SERPL-CCNC: 70 UNIT/L (ref 40–150)
ALT SERPL-CCNC: 20 UNIT/L (ref 0–55)
AST SERPL-CCNC: 30 UNIT/L (ref 5–34)
BASOPHILS # BLD AUTO: 0.03 X10(3)/MCL
BASOPHILS NFR BLD AUTO: 0.3 %
BILIRUB SERPL-MCNC: 1.1 MG/DL
BUN SERPL-MCNC: 21 MG/DL (ref 8.4–25.7)
CALCIUM SERPL-MCNC: 8.1 MG/DL (ref 8.8–10)
CHLORIDE SERPL-SCNC: 95 MMOL/L (ref 98–107)
CO2 SERPL-SCNC: 25 MMOL/L (ref 23–31)
CREAT SERPL-MCNC: 1.53 MG/DL (ref 0.73–1.18)
EOSINOPHIL # BLD AUTO: 0.09 X10(3)/MCL (ref 0–0.9)
EOSINOPHIL NFR BLD AUTO: 0.8 %
ERYTHROCYTE [DISTWIDTH] IN BLOOD BY AUTOMATED COUNT: 14.6 % (ref 11.5–17)
GFR SERPLBLD CREATININE-BSD FMLA CKD-EPI: 50 MLS/MIN/1.73/M2
GLOBULIN SER-MCNC: 3.5 GM/DL (ref 2.4–3.5)
GLUCOSE SERPL-MCNC: 162 MG/DL (ref 82–115)
HCT VFR BLD AUTO: 26.5 % (ref 42–52)
HGB BLD-MCNC: 8.5 G/DL (ref 14–18)
IMM GRANULOCYTES # BLD AUTO: 0.06 X10(3)/MCL (ref 0–0.04)
IMM GRANULOCYTES NFR BLD AUTO: 0.5 %
LYMPHOCYTES # BLD AUTO: 1.2 X10(3)/MCL (ref 0.6–4.6)
LYMPHOCYTES NFR BLD AUTO: 10.9 %
MAGNESIUM SERPL-MCNC: 2.3 MG/DL (ref 1.6–2.6)
MCH RBC QN AUTO: 30.1 PG (ref 27–31)
MCHC RBC AUTO-ENTMCNC: 32.1 G/DL (ref 33–36)
MCV RBC AUTO: 94 FL (ref 80–94)
MONOCYTES # BLD AUTO: 0.91 X10(3)/MCL (ref 0.1–1.3)
MONOCYTES NFR BLD AUTO: 8.3 %
NEUTROPHILS # BLD AUTO: 8.71 X10(3)/MCL (ref 2.1–9.2)
NEUTROPHILS NFR BLD AUTO: 79.2 %
NRBC BLD AUTO-RTO: 0 %
PLATELET # BLD AUTO: 96 X10(3)/MCL (ref 130–400)
PLATELETS.RETICULATED NFR BLD AUTO: 18.1 % (ref 0.9–11.2)
PMV BLD AUTO: 13.2 FL (ref 7.4–10.4)
POTASSIUM SERPL-SCNC: 4.5 MMOL/L (ref 3.5–5.1)
PROT SERPL-MCNC: 6.6 GM/DL (ref 5.8–7.6)
RBC # BLD AUTO: 2.82 X10(6)/MCL (ref 4.7–6.1)
SODIUM SERPL-SCNC: 133 MMOL/L (ref 136–145)
WBC # SPEC AUTO: 11 X10(3)/MCL (ref 4.5–11.5)

## 2024-02-03 PROCEDURE — 21400001 HC TELEMETRY ROOM

## 2024-02-03 PROCEDURE — 80053 COMPREHEN METABOLIC PANEL: CPT | Performed by: SPECIALIST

## 2024-02-03 PROCEDURE — 83735 ASSAY OF MAGNESIUM: CPT | Performed by: SPECIALIST

## 2024-02-03 PROCEDURE — 85025 COMPLETE CBC W/AUTO DIFF WBC: CPT | Performed by: SPECIALIST

## 2024-02-03 PROCEDURE — 25000003 PHARM REV CODE 250: Performed by: PHYSICIAN ASSISTANT

## 2024-02-03 PROCEDURE — 99024 POSTOP FOLLOW-UP VISIT: CPT | Mod: POP,,,

## 2024-02-03 PROCEDURE — 63600175 PHARM REV CODE 636 W HCPCS: Performed by: PHYSICIAN ASSISTANT

## 2024-02-03 PROCEDURE — 25000003 PHARM REV CODE 250: Performed by: NURSE PRACTITIONER

## 2024-02-03 PROCEDURE — 25000003 PHARM REV CODE 250

## 2024-02-03 PROCEDURE — 25000003 PHARM REV CODE 250: Performed by: SPECIALIST

## 2024-02-03 PROCEDURE — 63600175 PHARM REV CODE 636 W HCPCS: Performed by: NURSE PRACTITIONER

## 2024-02-03 PROCEDURE — 97110 THERAPEUTIC EXERCISES: CPT

## 2024-02-03 PROCEDURE — 63600175 PHARM REV CODE 636 W HCPCS: Performed by: INTERNAL MEDICINE

## 2024-02-03 PROCEDURE — 94760 N-INVAS EAR/PLS OXIMETRY 1: CPT

## 2024-02-03 PROCEDURE — 63600175 PHARM REV CODE 636 W HCPCS

## 2024-02-03 RX ORDER — OXYCODONE HYDROCHLORIDE 5 MG/1
5 TABLET ORAL EVERY 4 HOURS PRN
Status: DISCONTINUED | OUTPATIENT
Start: 2024-02-03 | End: 2024-02-06 | Stop reason: HOSPADM

## 2024-02-03 RX ADMIN — MUPIROCIN: 20 OINTMENT TOPICAL at 08:02

## 2024-02-03 RX ADMIN — FAMOTIDINE 20 MG: 20 TABLET, FILM COATED ORAL at 09:02

## 2024-02-03 RX ADMIN — OXYCODONE HYDROCHLORIDE 5 MG: 5 TABLET ORAL at 09:02

## 2024-02-03 RX ADMIN — FOLIC ACID 1 MG: 1 TABLET ORAL at 09:02

## 2024-02-03 RX ADMIN — OXYCODONE HYDROCHLORIDE 5 MG: 5 TABLET ORAL at 04:02

## 2024-02-03 RX ADMIN — TACROLIMUS 0.5 MG: 0.5 CAPSULE ORAL at 05:02

## 2024-02-03 RX ADMIN — ASPIRIN 81 MG: 81 TABLET, COATED ORAL at 08:02

## 2024-02-03 RX ADMIN — OXYCODONE HYDROCHLORIDE 5 MG: 5 TABLET ORAL at 05:02

## 2024-02-03 RX ADMIN — ENOXAPARIN SODIUM 40 MG: 40 INJECTION SUBCUTANEOUS at 04:02

## 2024-02-03 RX ADMIN — OXYCODONE HYDROCHLORIDE 10 MG: 5 TABLET ORAL at 12:02

## 2024-02-03 RX ADMIN — SUCRALFATE 1 G: 1 TABLET ORAL at 08:02

## 2024-02-03 RX ADMIN — FUROSEMIDE 40 MG: 10 INJECTION, SOLUTION INTRAVENOUS at 08:02

## 2024-02-03 RX ADMIN — TACROLIMUS 1 MG: 1 CAPSULE ORAL at 09:02

## 2024-02-03 RX ADMIN — SUCRALFATE 1 G: 1 TABLET ORAL at 04:02

## 2024-02-03 RX ADMIN — FAMOTIDINE 20 MG: 20 TABLET, FILM COATED ORAL at 08:02

## 2024-02-03 RX ADMIN — ATORVASTATIN CALCIUM 20 MG: 10 TABLET, FILM COATED ORAL at 08:02

## 2024-02-03 RX ADMIN — DOCUSATE SODIUM 100 MG: 100 CAPSULE, LIQUID FILLED ORAL at 08:02

## 2024-02-03 RX ADMIN — MUPIROCIN: 20 OINTMENT TOPICAL at 09:02

## 2024-02-03 RX ADMIN — ESCITALOPRAM OXALATE 10 MG: 10 TABLET ORAL at 08:02

## 2024-02-03 NOTE — NURSING
Nurses Note -- 4 Eyes      2/2/2024   10:11 PM      Skin assessed during: Transfer      [x] No Altered Skin Integrity Present    [x]Prevention Measures Documented      [] Yes- Altered Skin Integrity Present or Discovered   [] LDA Added if Not in Epic (Describe Wound)   [] New Altered Skin Integrity was Present on Admit and Documented in LDA   [] Wound Image Taken    Wound Care Consulted? No    Attending Nurse:  Humphrey Foster LPN    Second RN/Staff Member:  Jm WOODWARD

## 2024-02-03 NOTE — PROGRESS NOTES
"  [facility]    Cardiology  Progress Note    Patient Name: Radu Rock  MRN: [id number]  Admission Date: 1/31/2024  Hospital Length of Stay: 3 days  Code Status: Full Code   Attending Provider: Juventino Ferrell IV, MD   Consulting Provider: FATOU Muse  Primary Care Physician: Mariella Huntley MD  Principal Problem:<principal problem not specified>    Patient information was obtained from patient, past medical records, and ER records.     Subjective:     Chief Complaint: Reason for Consult: Post CABG     HPI: Mr. Rock is a 63 y/o male who is known to [facility], Dr. Carlos. The patient presented to [facility] on 1.31.24 for a Planned CABG with CV Surgery/Dr. Ferrell. He recently underwent a workup with Nuclear PET which was abnormal, prompting a C with noted MVCAD. He was referred to CV Surgery for CABG Evaluation and she was deemed a candidate for CABG. On 1.31.24 he underwent a CABG with results of LIMA to LAD, rSVG to OM, rSVG to PDA and Bilateral EVH. He tolerated the procedure and was admitted for observation in the ICU and further management. [facility] has been consulted for Post Operative Medical Management.     2.1.24: NAD. "I am good." Sitting in Bedside Chair. Denies SOB and Palps. + CP/Incisional.   2.2.24: NAD. "I am a tad SOB." + SOB, + CP/Incisional. Denies Palps. SBP 90s.HR 100s.  2.3.24: NAD. "I am good." + CP/Incisional. Denies Palps and SOB. + SMITH Only. Remains in SR. Fluid Balance Net Negative 2340mL. Labs Ordered/Not Drawn.     PMH: HLD, HTN, MVCAD/CABG, Liver Transplant, Liver Transplant/Cirrhosis, Liver Cancer, HCV/Hepatitis C Virus, MI, Obesity, Osteoporosis, TAY/CPAP  PSH: Liver Transplant, CABG, Angiogram, Cholecystectomy, Appendectomy, LE Surgery, Shoulder Surgery, EGD  Family History: Denies Family History of Heart Disease  Social History: Denies Illicit Drug, ETOH and Tobacco Use    Previous Cardiac Diagnostics:   CABG 1.31.24:  LIMA to LAD, rSVG to OM, rSVG to PDA, " Bilateral EVH.    [facility] 1.22.24:  Procedures performed:  1. Ultrasound-guided right radial access  2. Coronary angiography  3. Left ventricular hemodynamics  4. IFR interrogation  -LAD 0.82  -circumflex 0.88  -PDA 0.91  5. LV EDP-19  6. Lima-angio  Findings:  1. Left main-normal  2. Lad-60-70% lad mid ISR involving large diagonal, IFR positive, 0.82 in LAD  3. LCX-non dominant, 80% proximal lesion extending into OM2, OM1 70% ostial lesion  4. RCA-dominant, 60% proximal lesion, 80% ostial PDA  5. LIMA patent  Plan:  1. Maximize medical management  2. Consult CTS for bypass evaluation.  If patient is not a candidate will need Laser atherectomy of LAD stent and PCI of Mid LAD, Circumlex, and possibly PDA.  Would re-evalaute PDA at time of PCI, but I think it is bad enough for intervention.  Inferoapical walls are involved on stress test.    PET 1.19.24:  This is an abnormal perfusion study.   This scan is suggestive of moderate to high risk for future cardiovascular events.   Small partially reversible perfusion abnormality of severe intensity in the apical segment. Small reversible perfusion abnormality of moderate intensity in the apical anterior segment. Small partially reversible perfusion abnormality of severe intensity in the apical septal segment. Small partially reversible perfusion abnormality of severe intensity in the apical inferior segment.   The left ventricular cavity is noted to be normal on the stress studies. The stress left ventricular ejection fraction was calculated to be 64% and left ventricular global function is normal. The rest left ventricular cavity is noted to be normal. The rest left ventricular ejection fraction was calculated to be 64% and rest left ventricular global function is normal.   Compared with rest and stress studies the ejection fraction remains unchanged (64).   The study quality is average.   There was a rise in myocardial blood flow between rest and stress.  Global myocardial  blood flow reserve was 3.01.  Normal global coronary flow reserve is suggestive of low coronary event risk.    ECHO 1.10.24:  The left ventricle is normal in size with moderate to severe, asymmetric septal left ventricular hypertrophy and mild, asymmetric posterior left ventricular hypertrophy and normal left ventricular systolic function. The left ventricular ejection fraction is 65%. Left ventricular diastolic function is normal. Bradycardia is noted.  Mild dilatation of the aortic root is noted at 3.9 cms. The aortic arch is normal at 3.1 cms.   Unable to adequately visualize and assess the pulmonary artery.  Poor short axis echo windows.  The study quality is average.     Review of patient's allergies indicates:  No Known Allergies    No current facility-administered medications on file prior to encounter.     Current Outpatient Medications on File Prior to Encounter   Medication Sig    alendronate (FOSAMAX) 35 MG tablet Take 35 mg by mouth every 7 days. Mondays    amLODIPine (NORVASC) 5 MG tablet Take 5 mg by mouth nightly.    aspirin (ECOTRIN) 81 MG EC tablet Take 81 mg by mouth once.    atorvastatin (LIPITOR) 20 MG tablet Take 20 mg by mouth every evening.    EScitalopram oxalate (LEXAPRO) 10 MG tablet Take 10 mg by mouth every morning.    magnesium oxide (MAG-OX) 400 mg (241.3 mg magnesium) tablet Take 400 mg by mouth 2 (two) times daily.    metoprolol tartrate (LOPRESSOR) 25 MG tablet Take 25 mg by mouth 2 (two) times daily.    olmesartan (BENICAR) 20 MG tablet Take 20 mg by mouth once daily.    rifAXIMin (XIFAXAN) 550 mg Tab Take 550 mg by mouth 2 (two) times daily.    tacrolimus (PROGRAF) 1 MG Cap Take 1 mg by mouth. 1 mg in the am and 0.5mg in the pm    cholecalciferol, vitamin D3, 1,250 mcg (50,000 unit) capsule Take 50,000 Units by mouth every 7 days. Monday    clopidogreL (PLAVIX) 75 mg tablet Take 75 mg by mouth once daily.     Review of Systems   Constitutional:  Positive for fatigue.   Respiratory:   Positive for shortness of breath.    Cardiovascular:  Positive for chest pain (Incisional). Negative for palpitations and leg swelling.   All other systems reviewed and are negative.    Objective:     Vital Signs (Most Recent):  Temp: 99.3 °F (37.4 °C) (02/03/24 1107)  Pulse: 94 (02/03/24 1107)  Resp: 18 (02/03/24 0959)  BP: 113/73 (02/03/24 1107)  SpO2: (!) 91 % (02/03/24 1107) Vital Signs (24h Range):  Temp:  [98.6 °F (37 °C)-100.2 °F (37.9 °C)] 99.3 °F (37.4 °C)  Pulse:  [] 94  Resp:  [15-32] 18  SpO2:  [86 %-97 %] 91 %  BP: (102-131)/(64-73) 113/73     Weight: 106.8 kg (235 lb 6.4 oz)  Body mass index is 33.78 kg/m².    SpO2: (!) 91 %         Intake/Output Summary (Last 24 hours) at 2/3/2024 1142  Last data filed at 2/3/2024 0433  Gross per 24 hour   Intake 200 ml   Output 2540 ml   Net -2340 ml       Lines/Drains/Airways       Drain  Duration                  Chest Tube 01/31/24 0933 Mediastinal 24 Fr. 3 days              Peripheral Intravenous Line  Duration                  Peripheral IV - Single Lumen 01/31/24 0540 18 G Anterior;Distal;Left Forearm 3 days                  Significant Labs:  Recent Results (from the past 72 hour(s))   POCT glucose    Collection Time: 01/31/24 12:58 PM   Result Value Ref Range    POCT Glucose 131 (H) 70 - 110 mg/dL   POCT glucose    Collection Time: 01/31/24  3:06 PM   Result Value Ref Range    POCT Glucose 109 70 - 110 mg/dL   Protime-INR    Collection Time: 01/31/24  3:08 PM   Result Value Ref Range    PT 15.3 (H) 12.5 - 14.5 seconds    INR 1.2 <=1.3   Potassium    Collection Time: 01/31/24  3:08 PM   Result Value Ref Range    Potassium Level 4.1 3.5 - 5.1 mmol/L   Hemoglobin and Hematocrit    Collection Time: 01/31/24  3:08 PM   Result Value Ref Range    Hgb 10.2 (L) 14.0 - 18.0 g/dL    Hct 30.1 (L) 42.0 - 52.0 %   APTT    Collection Time: 01/31/24  3:08 PM   Result Value Ref Range    PTT 29.4 23.2 - 33.7 seconds   POCT glucose    Collection Time: 01/31/24  4:07 PM    Result Value Ref Range    POCT Glucose 108 70 - 110 mg/dL   Transesophageal echo (LALITA)    Collection Time: 01/31/24  4:50 PM   Result Value Ref Range    BSA 2.23 m2   POCT glucose    Collection Time: 01/31/24  5:15 PM   Result Value Ref Range    POCT Glucose 122 (H) 70 - 110 mg/dL   POCT glucose    Collection Time: 01/31/24  6:18 PM   Result Value Ref Range    POCT Glucose 117 (H) 70 - 110 mg/dL   POCT glucose    Collection Time: 01/31/24  7:49 PM   Result Value Ref Range    POCT Glucose 138 (H) 70 - 110 mg/dL   POCT glucose    Collection Time: 01/31/24 10:13 PM   Result Value Ref Range    POCT Glucose 129 (H) 70 - 110 mg/dL   POCT glucose    Collection Time: 02/01/24 12:02 AM   Result Value Ref Range    POCT Glucose 124 (H) 70 - 110 mg/dL   POCT glucose    Collection Time: 02/01/24  1:55 AM   Result Value Ref Range    POCT Glucose 129 (H) 70 - 110 mg/dL   POCT glucose    Collection Time: 02/01/24  4:18 AM   Result Value Ref Range    POCT Glucose 134 (H) 70 - 110 mg/dL   CBC Without Differential    Collection Time: 02/01/24  4:19 AM   Result Value Ref Range    WBC 12.06 (H) 4.50 - 11.50 x10(3)/mcL    RBC 3.30 (L) 4.70 - 6.10 x10(6)/mcL    Hgb 9.9 (L) 14.0 - 18.0 g/dL    Hct 30.1 (L) 42.0 - 52.0 %    MCV 91.2 80.0 - 94.0 fL    MCH 30.0 27.0 - 31.0 pg    MCHC 32.9 (L) 33.0 - 36.0 g/dL    RDW 14.6 11.5 - 17.0 %    Platelet 93 (L) 130 - 400 x10(3)/mcL    MPV 12.9 (H) 7.4 - 10.4 fL    NRBC% 0.0 %   Comprehensive Metabolic Panel    Collection Time: 02/01/24  4:19 AM   Result Value Ref Range    Sodium Level 137 136 - 145 mmol/L    Potassium Level 4.4 3.5 - 5.1 mmol/L    Chloride 109 (H) 98 - 107 mmol/L    Carbon Dioxide 21 (L) 23 - 31 mmol/L    Glucose Level 129 (H) 82 - 115 mg/dL    Blood Urea Nitrogen 14.9 8.4 - 25.7 mg/dL    Creatinine 1.32 (H) 0.73 - 1.18 mg/dL    Calcium Level Total 7.9 (L) 8.8 - 10.0 mg/dL    Protein Total 5.3 (L) 5.8 - 7.6 gm/dL    Albumin Level 3.3 (L) 3.4 - 4.8 g/dL    Globulin 2.0 (L) 2.4 - 3.5  gm/dL    Albumin/Globulin Ratio 1.7 1.1 - 2.0 ratio    Bilirubin Total 0.6 <=1.5 mg/dL    Alkaline Phosphatase 32 (L) 40 - 150 unit/L    Alanine Aminotransferase 25 0 - 55 unit/L    Aspartate Aminotransferase 28 5 - 34 unit/L    eGFR 59 mls/min/1.73/m2   POCT glucose    Collection Time: 02/01/24  6:09 AM   Result Value Ref Range    POCT Glucose 125 (H) 70 - 110 mg/dL   Magnesium    Collection Time: 02/02/24  2:17 AM   Result Value Ref Range    Magnesium Level 2.10 1.60 - 2.60 mg/dL   CBC Without Differential    Collection Time: 02/02/24  2:17 AM   Result Value Ref Range    WBC 14.67 (H) 4.50 - 11.50 x10(3)/mcL    RBC 2.96 (L) 4.70 - 6.10 x10(6)/mcL    Hgb 8.9 (L) 14.0 - 18.0 g/dL    Hct 28.3 (L) 42.0 - 52.0 %    MCV 95.6 (H) 80.0 - 94.0 fL    MCH 30.1 27.0 - 31.0 pg    MCHC 31.4 (L) 33.0 - 36.0 g/dL    RDW 14.9 11.5 - 17.0 %    Platelet 82 (L) 130 - 400 x10(3)/mcL    MPV 13.0 (H) 7.4 - 10.4 fL    IPF 17.1 (H) 0.9 - 11.2 %    NRBC% 0.0 %   Comprehensive Metabolic Panel    Collection Time: 02/02/24  2:17 AM   Result Value Ref Range    Sodium Level 136 136 - 145 mmol/L    Potassium Level 4.6 3.5 - 5.1 mmol/L    Chloride 102 98 - 107 mmol/L    Carbon Dioxide 23 23 - 31 mmol/L    Glucose Level 114 82 - 115 mg/dL    Blood Urea Nitrogen 19.7 8.4 - 25.7 mg/dL    Creatinine 1.75 (H) 0.73 - 1.18 mg/dL    Calcium Level Total 7.6 (L) 8.8 - 10.0 mg/dL    Protein Total 5.3 (L) 5.8 - 7.6 gm/dL    Albumin Level 3.3 (L) 3.4 - 4.8 g/dL    Globulin 2.0 (L) 2.4 - 3.5 gm/dL    Albumin/Globulin Ratio 1.7 1.1 - 2.0 ratio    Bilirubin Total 1.1 <=1.5 mg/dL    Alkaline Phosphatase 45 40 - 150 unit/L    Alanine Aminotransferase 21 0 - 55 unit/L    Aspartate Aminotransferase 33 5 - 34 unit/L    eGFR 42 mls/min/1.73/m2     Telemetry: SR    Physical Exam  Constitutional:       General: He is not in acute distress.     Appearance: Normal appearance.   HENT:      Head: Normocephalic.      Mouth/Throat:      Mouth: Mucous membranes are moist.    Eyes:      Conjunctiva/sclera: Conjunctivae normal.   Cardiovascular:      Rate and Rhythm: Normal rate and regular rhythm.      Pulses: Normal pulses.      Heart sounds: Normal heart sounds. No murmur heard.  Pulmonary:      Effort: Pulmonary effort is normal.      Breath sounds: Rhonchi and rales present.      Comments: NC O2  Abdominal:      Palpations: Abdomen is soft.   Musculoskeletal:      Right lower leg: No edema.      Left lower leg: No edema.   Skin:     General: Skin is warm and dry.      Comments: Midline Sternotomy Dressing C/D/I. + CTs    Neurological:      General: No focal deficit present.      Mental Status: He is alert and oriented to person, place, and time. Mental status is at baseline.   Psychiatric:         Mood and Affect: Mood normal.         Behavior: Behavior normal.       Home Medications:   No current facility-administered medications on file prior to encounter.     Current Outpatient Medications on File Prior to Encounter   Medication Sig Dispense Refill    alendronate (FOSAMAX) 35 MG tablet Take 35 mg by mouth every 7 days. Mondays      amLODIPine (NORVASC) 5 MG tablet Take 5 mg by mouth nightly.      aspirin (ECOTRIN) 81 MG EC tablet Take 81 mg by mouth once.      atorvastatin (LIPITOR) 20 MG tablet Take 20 mg by mouth every evening.      EScitalopram oxalate (LEXAPRO) 10 MG tablet Take 10 mg by mouth every morning.      magnesium oxide (MAG-OX) 400 mg (241.3 mg magnesium) tablet Take 400 mg by mouth 2 (two) times daily.      metoprolol tartrate (LOPRESSOR) 25 MG tablet Take 25 mg by mouth 2 (two) times daily.      olmesartan (BENICAR) 20 MG tablet Take 20 mg by mouth once daily.      rifAXIMin (XIFAXAN) 550 mg Tab Take 550 mg by mouth 2 (two) times daily.      tacrolimus (PROGRAF) 1 MG Cap Take 1 mg by mouth. 1 mg in the am and 0.5mg in the pm      cholecalciferol, vitamin D3, 1,250 mcg (50,000 unit) capsule Take 50,000 Units by mouth every 7 days. Monday      clopidogreL (PLAVIX)  75 mg tablet Take 75 mg by mouth once daily.       Current Inpatient Medications:    Current Facility-Administered Medications:     acetaminophen oral solution 650 mg, 650 mg, Per OG tube, Q6H PRN, Theron Morris PA-C    albumin human 5% bottle 12.5 g, 12.5 g, Intravenous, PRN, Theron Morris PA-C, Stopped at 01/31/24 1302    aspirin EC tablet 81 mg, 81 mg, Oral, Daily, Theron Morris PA-C, 81 mg at 02/03/24 0858    atorvastatin tablet 20 mg, 20 mg, Oral, QHS, Johan Ball ANP, 20 mg at 02/02/24 2010    calcium gluconate 1 g in NS IVPB (premixed), 1 g, Intravenous, PRN, Theron Morris PA-C    calcium gluconate 1 g in NS IVPB (premixed), 2 g, Intravenous, PRN, Theron Morris PA-C    calcium gluconate 1 g in NS IVPB (premixed), 3 g, Intravenous, PRN, Theron Morris PA-C    dextrose 10% bolus 125 mL 125 mL, 12.5 g, Intravenous, PRN, Theron Morris PA-C    dextrose 10% bolus 250 mL 250 mL, 25 g, Intravenous, PRN, Theron Morris PA-C    docusate sodium capsule 100 mg, 100 mg, Oral, BID, Theron Morris PA-C, 100 mg at 02/03/24 0858    enoxaparin injection 40 mg, 40 mg, Subcutaneous, Daily, Theron Morris PA-C, 40 mg at 02/02/24 1711    EScitalopram oxalate tablet 10 mg, 10 mg, Oral, Daily, Juventino Ferrell IV, MD, 10 mg at 02/03/24 0859    famotidine tablet 20 mg, 20 mg, Oral, BID, Kristie Cook MD, 20 mg at 02/03/24 0900    folic acid tablet 1 mg, 1 mg, Oral, Daily, Theron Morris PA-C, 1 mg at 02/03/24 0900    furosemide injection 40 mg, 40 mg, Intravenous, Q12H, Johan Ball ANP, 40 mg at 02/03/24 0858    HYDROcodone-acetaminophen 5-325 mg per tablet 1 tablet, 1 tablet, Oral, Q4H PRN, Theron Morris PA-C, 1 tablet at 02/01/24 0412    ketorolac injection 30 mg, 30 mg, Intravenous, Q6H PRN, Josh Solano MD, 30 mg at 02/01/24 1724    lactulose 10 gram/15 ml solution 20 g, 20 g, Oral, Q6H PRN, Theron Morris PA-C    loperamide capsule 2 mg, 2 mg, Oral, Continuous PRN,  Theron Morris PA-C    magnesium sulfate 2g in water 50mL IVPB (premix), 2 g, Intravenous, PRN, Theron Morris PA-C    magnesium sulfate 2g in water 50mL IVPB (premix), 4 g, Intravenous, PRN, Theron Morris PA-C    metoclopramide injection 5 mg, 5 mg, Intravenous, Q6H PRN, Theron Morris PA-C    morphine injection 4 mg, 4 mg, Intravenous, Q4H PRN, Theron Morris PA-C, 4 mg at 01/31/24 1051    mupirocin 2 % ointment, , Nasal, BID, Theron Morris PA-C, Given at 02/03/24 0900    ondansetron injection 4 mg, 4 mg, Intravenous, Q4H PRN, Theron Morris PA-C, 4 mg at 01/31/24 2044    oxyCODONE immediate release tablet 5 mg, 5 mg, Oral, Q4H PRN, Juventino Ferrell IV, MD, 5 mg at 02/03/24 0959    potassium chloride 20 mEq in 100 mL IVPB (FOR CENTRAL LINE ADMINISTRATION ONLY), 20 mEq, Intravenous, PRN, Theron Morris PA-C    potassium chloride 20 mEq in 100 mL IVPB (FOR CENTRAL LINE ADMINISTRATION ONLY), 40 mEq, Intravenous, PRN, Theron Morris PA-C    potassium chloride 20 mEq in 100 mL IVPB (FOR CENTRAL LINE ADMINISTRATION ONLY), 60 mEq, Intravenous, PRN, Theron Morris PA-C    sodium phosphate 15 mmol in dextrose 5 % (D5W) 250 mL IVPB, 15 mmol, Intravenous, PRN, Theron Morris PA-EDWAR    sodium phosphate 20.01 mmol in dextrose 5 % (D5W) 250 mL IVPB, 20.01 mmol, Intravenous, PRN, Theron Morris PA-EDWAR    sodium phosphate 30 mmol in dextrose 5 % (D5W) 250 mL IVPB, 30 mmol, Intravenous, PRN, Theron Morris PA-C    sucralfate tablet 1 g, 1 g, Oral, QID (AC & HS), Theron Morris PA-C, 1 g at 02/02/24 2011    tacrolimus capsule 0.5 mg, 0.5 mg, Oral, Daily PM, Jacklyn Sam FNP, 0.5 mg at 02/02/24 1749    tacrolimus capsule 1 mg, 1 mg, Oral, Daily AM, Josh Solano MD, 1 mg at 02/03/24 0900    VTE Risk Mitigation (From admission, onward)           Ordered     enoxaparin injection 40 mg  Daily         01/31/24 1008     Place sequential compression device  Until discontinued          01/31/24 1008     IP VTE HIGH RISK PATIENT  Once         01/31/24 0840                  Assessment:   MVCAD    - CABG (1.31.24) - LIMA to LAD, rSVG to OM, rSVG to PDA, Bilateral EVH    - LHC (1.22.24) - 1. Left main-normal 2. Lad-60-70% lad mid ISR involving large diagonal, IFR positive, 0.82 in LAD 3. LCX-non dominant, 80% proximal lesion extending into OM2, OM1 70% ostial lesion 4. RCA-dominant, 60% proximal lesion, 80% ostial PDA 5. LIMA patent    - ECHO 1.10.24 - LVEF 65%  Acute on Chronic Diastolic HF/EF 65%     - CXR (2.2.24) - Pulmonary Vascular Congestion   Febrile   BRIAN   HTN - Borderline BP   HLD  Cirrhosis/Liver Cancer/HCV s/p Liver Transplant  Anemia   Hx of MI  Obesity  Osteoporosis  TAY/CPAP  Leukocytosis   Thrombocytopenia   No Hx of GIB     Plan:   Continue ASA and Statin  Hx of Liver Transplant/Cautious Use of Statin - Monitor LFTs  D/C BB to Give Room for Diuresis/Will Add GDMT Back when BP Allows  Pending Morning Lab Draw   Lasix 40mg IVP Q12HRs with Possible Transition to Oral in AM if Approaches Euvolemia   Accurate I&Os and Daily Weights   Encourage Aggressive Mobilization of PT and Q1HR IS  Labs in AM: CBC, CMP and Mg    FATOU Muse  Cardiology  [facility]  02/03/2024     I agree with the findings of the complexity of problems addressed and take responsibility for the plan's risks and complications. I approved the plan documented by Johan Ball NP.  Patient is post CABG, continue IV diuretics.  Beta-blocker on hold due to soft blood pressure.  Closely monitor hemoglobin and platelet count, hemoglobin 8.9 and platelet count is 82.  No overt bleeding

## 2024-02-03 NOTE — NURSING
Nurses Note -- 4 Eyes      2/2/2024   9:15 PM      Skin assessed during: Daily Assessment      [x] No Altered Skin Integrity Present    [x]Prevention Measures Documented      [] Yes- Altered Skin Integrity Present or Discovered   [] LDA Added if Not in Epic (Describe Wound)   [] New Altered Skin Integrity was Present on Admit and Documented in LDA   [] Wound Image Taken    Wound Care Consulted? No    Attending Nurse:  Lissy Sow RN/Staff Member:  Anaid

## 2024-02-03 NOTE — PROGRESS NOTES
02/03/24 1020   Pre Exercise Vitals   /67   Pulse 91   Supplemental O2? Yes   O2 Device high-flow nasal cannula   O2 Flow (L/min) 4   During Exercise Vitals   Pulse 114   Supplemental O2? Yes   O2 Device high-flow nasal cannula   O2 Flow (L/min) 5   SpO2 91 %  (88-91)   Distance Walked 125 feet   Post Exercise Vitals   /76   Pulse 99   Supplemental O2? Yes   O2 Device high-flow nasal cannula   O2 Flow (L/min) 5   SpO2 95 %   Modality   Modality   (rollator)     Min assist x1 from sitting to standing. Sternal precautions maintained. Gait steady and slow. One brief standing rest break for mild sob. Encouraged incentive spirometer q 1 hour and increase activity. Performed incentive spirometer max 1000. Communicated with nurse pre and post walk.

## 2024-02-03 NOTE — PROGRESS NOTES
CT SURGERY PROGRESS NOTE  Radu Rock  64 y.o.  1960    Patients Procedure: Procedure(s) (LRB):  CORONARY ARTERY BYPASS GRAFT (CABG) (N/A)  SURGICAL PROCUREMENT, VEIN, ENDOSCOPIC (Bilateral)    Subjective  Interval History: Patient is postoperative day 3 from CABG.  No acute events overnight.  Up in chair, multiple family members at bedside.  Complaining of some incisional chest pain and dyspnea on exertion.  Remains on supplemental oxygen - 4L oxymizer.        Review of Systems   Constitutional:  Positive for malaise/fatigue. Negative for chills and fever.   Respiratory:  Positive for shortness of breath (on exertion). Negative for cough and wheezing.    Cardiovascular:  Positive for chest pain (incisional). Negative for palpitations and leg swelling.       Medication List  Infusions   loperamide       Scheduled   aspirin  81 mg Oral Daily    atorvastatin  20 mg Oral QHS    docusate sodium  100 mg Oral BID    enoxparin  40 mg Subcutaneous Daily    EScitalopram oxalate  10 mg Oral Daily    famotidine  20 mg Oral BID    folic acid  1 mg Oral Daily    furosemide (LASIX) injection  40 mg Intravenous Q12H    mupirocin   Nasal BID    sucralfate  1 g Oral QID (AC & HS)    tacrolimus  0.5 mg Oral Daily PM    tacrolimus  1 mg Oral Daily AM       Objective:  Recent Vitals:  Temp:  [98.6 °F (37 °C)-100.2 °F (37.9 °C)] 99.3 °F (37.4 °C)  Pulse:  [] 94  Resp:  [15-29] 18  SpO2:  [86 %-94 %] 91 %  BP: (102-131)/(64-73) 113/73    Physical Exam  Constitutional:       General: He is not in acute distress.     Appearance: He is obese. He is not ill-appearing.   Cardiovascular:      Rate and Rhythm: Normal rate and regular rhythm.      Pulses: Normal pulses.      Heart sounds: Normal heart sounds. No murmur heard.     No friction rub. No gallop.   Pulmonary:      Effort: Pulmonary effort is normal. No respiratory distress.      Breath sounds: Rhonchi present. No rales.      Comments: Chest tube in place  Abdominal:       General: There is no distension.      Palpations: Abdomen is soft.   Musculoskeletal:      Right lower leg: Edema present.      Left lower leg: Edema present.   Skin:     General: Skin is warm and dry.      Comments: Median sternotomy incision c/d/i   Neurological:      General: No focal deficit present.      Mental Status: He is alert and oriented to person, place, and time.   Psychiatric:         Mood and Affect: Mood normal.         Behavior: Behavior normal.          I/O last 24 hrs:  Intake/Output - Last 3 Shifts         02/01 0700 02/02 0659 02/02 0700 02/03 0659 02/03 0700 02/04 0659    P.O. 600 200 720    I.V. (mL/kg) 210.7 (2)      IV Piggyback 50      Total Intake(mL/kg) 860.7 (8) 200 (1.9) 720 (6.7)    Urine (mL/kg/hr) 725 (0.3) 2400 (0.9) 750 (0.9)    Stool   0    Chest Tube 170 140     Total Output 895 2540 750    Net -34.3 -2340 -30           Stool Occurrence   0 x            Labs  CBC:   Recent Labs   Lab 02/03/24  1237   WBC 11.00   RBC 2.82*   HGB 8.5*   HCT 26.5*   PLT 96*   MCV 94.0   MCH 30.1   MCHC 32.1*     CMP:   Recent Labs   Lab 02/03/24  1237   CALCIUM 8.1*   ALBUMIN 3.1*   *   K 4.5   CO2 25   BUN 21.0   CREATININE 1.53*   ALKPHOS 70   ALT 20   AST 30   BILITOT 1.1     LFTs:   Recent Labs   Lab 02/03/24  1237   ALT 20   AST 30   ALKPHOS 70   BILITOT 1.1   ALBUMIN 3.1*     All pertinent labs from the last 24 hours have been reviewed.      Imaging:   CXR: X-Ray Chest AP Portable    Result Date: 2/3/2024  Stable exam without significant interval change. Electronically signed by: Jayshree Carrasco Date:    02/03/2024 Time:    10:45   I have reviewed all pertinent imaging results/findings within the past 24 hours.        ASSESSMENT/PLAN:    Coronary artery disease  -s/p CABG  Acute on chronic diastolic heart failure   HTN  BRIAN  Cirrhosis / Liver cancer / HCV s/p Liver transplant    -Ct output: 140 cc / 24 hrs.  Serosanguinous.  Continue to water seal as patient has not ambulated  much.  -CXR with small left pleural effusion with basilar airspace opacities.  Otherwise, stable  -H&H stable 8.5 /26.5  -Renal indices stable.  Cr down to 1.53 this morning  -LFTs normal - will continue to closely monitor.  -Continue BID IV lasix  -Progressive mobilization and regular IS use  -Wean oxygen as tolerated     Case and plan of care discussed with Dr. Aki Ash, BECKAP

## 2024-02-04 LAB
ALBUMIN SERPL-MCNC: 3 G/DL (ref 3.4–4.8)
ALBUMIN/GLOB SERPL: 1.1 RATIO (ref 1.1–2)
ALP SERPL-CCNC: 89 UNIT/L (ref 40–150)
ALT SERPL-CCNC: 21 UNIT/L (ref 0–55)
AST SERPL-CCNC: 34 UNIT/L (ref 5–34)
BASOPHILS # BLD AUTO: 0.02 X10(3)/MCL
BASOPHILS NFR BLD AUTO: 0.2 %
BILIRUB SERPL-MCNC: 1 MG/DL
BUN SERPL-MCNC: 22 MG/DL (ref 8.4–25.7)
CALCIUM SERPL-MCNC: 7.7 MG/DL (ref 8.8–10)
CHLORIDE SERPL-SCNC: 98 MMOL/L (ref 98–107)
CO2 SERPL-SCNC: 28 MMOL/L (ref 23–31)
CREAT SERPL-MCNC: 1.41 MG/DL (ref 0.73–1.18)
EOSINOPHIL # BLD AUTO: 0.17 X10(3)/MCL (ref 0–0.9)
EOSINOPHIL NFR BLD AUTO: 2 %
ERYTHROCYTE [DISTWIDTH] IN BLOOD BY AUTOMATED COUNT: 14.8 % (ref 11.5–17)
GFR SERPLBLD CREATININE-BSD FMLA CKD-EPI: 55 MLS/MIN/1.73/M2
GLOBULIN SER-MCNC: 2.7 GM/DL (ref 2.4–3.5)
GLUCOSE SERPL-MCNC: 121 MG/DL (ref 82–115)
HCT VFR BLD AUTO: 25.2 % (ref 42–52)
HGB BLD-MCNC: 8.5 G/DL (ref 14–18)
IMM GRANULOCYTES # BLD AUTO: 0.05 X10(3)/MCL (ref 0–0.04)
IMM GRANULOCYTES NFR BLD AUTO: 0.6 %
LYMPHOCYTES # BLD AUTO: 1.16 X10(3)/MCL (ref 0.6–4.6)
LYMPHOCYTES NFR BLD AUTO: 13.7 %
MAGNESIUM SERPL-MCNC: 2.5 MG/DL (ref 1.6–2.6)
MCH RBC QN AUTO: 30.6 PG (ref 27–31)
MCHC RBC AUTO-ENTMCNC: 33.7 G/DL (ref 33–36)
MCV RBC AUTO: 90.6 FL (ref 80–94)
MONOCYTES # BLD AUTO: 0.94 X10(3)/MCL (ref 0.1–1.3)
MONOCYTES NFR BLD AUTO: 11.1 %
NEUTROPHILS # BLD AUTO: 6.13 X10(3)/MCL (ref 2.1–9.2)
NEUTROPHILS NFR BLD AUTO: 72.4 %
NRBC BLD AUTO-RTO: 0 %
PLATELET # BLD AUTO: 124 X10(3)/MCL (ref 130–400)
PMV BLD AUTO: 12.6 FL (ref 7.4–10.4)
POTASSIUM SERPL-SCNC: 4.4 MMOL/L (ref 3.5–5.1)
PROT SERPL-MCNC: 5.7 GM/DL (ref 5.8–7.6)
RBC # BLD AUTO: 2.78 X10(6)/MCL (ref 4.7–6.1)
SODIUM SERPL-SCNC: 135 MMOL/L (ref 136–145)
WBC # SPEC AUTO: 8.47 X10(3)/MCL (ref 4.5–11.5)

## 2024-02-04 PROCEDURE — 80053 COMPREHEN METABOLIC PANEL: CPT | Performed by: NURSE PRACTITIONER

## 2024-02-04 PROCEDURE — 97110 THERAPEUTIC EXERCISES: CPT

## 2024-02-04 PROCEDURE — 63600175 PHARM REV CODE 636 W HCPCS: Performed by: INTERNAL MEDICINE

## 2024-02-04 PROCEDURE — 25000003 PHARM REV CODE 250: Performed by: PHYSICIAN ASSISTANT

## 2024-02-04 PROCEDURE — 21400001 HC TELEMETRY ROOM

## 2024-02-04 PROCEDURE — 25000003 PHARM REV CODE 250: Performed by: NURSE PRACTITIONER

## 2024-02-04 PROCEDURE — 99024 POSTOP FOLLOW-UP VISIT: CPT | Mod: POP,,,

## 2024-02-04 PROCEDURE — 63600175 PHARM REV CODE 636 W HCPCS: Performed by: NURSE PRACTITIONER

## 2024-02-04 PROCEDURE — 63600175 PHARM REV CODE 636 W HCPCS

## 2024-02-04 PROCEDURE — 27000221 HC OXYGEN, UP TO 24 HOURS

## 2024-02-04 PROCEDURE — 63600175 PHARM REV CODE 636 W HCPCS: Performed by: PHYSICIAN ASSISTANT

## 2024-02-04 PROCEDURE — 94761 N-INVAS EAR/PLS OXIMETRY MLT: CPT

## 2024-02-04 PROCEDURE — 25000003 PHARM REV CODE 250: Performed by: SPECIALIST

## 2024-02-04 PROCEDURE — 83735 ASSAY OF MAGNESIUM: CPT | Performed by: NURSE PRACTITIONER

## 2024-02-04 PROCEDURE — 94760 N-INVAS EAR/PLS OXIMETRY 1: CPT

## 2024-02-04 PROCEDURE — 85025 COMPLETE CBC W/AUTO DIFF WBC: CPT | Performed by: NURSE PRACTITIONER

## 2024-02-04 PROCEDURE — 25000003 PHARM REV CODE 250

## 2024-02-04 RX ORDER — FUROSEMIDE 10 MG/ML
80 INJECTION INTRAMUSCULAR; INTRAVENOUS EVERY 12 HOURS
Status: DISCONTINUED | OUTPATIENT
Start: 2024-02-04 | End: 2024-02-05

## 2024-02-04 RX ADMIN — MUPIROCIN: 20 OINTMENT TOPICAL at 08:02

## 2024-02-04 RX ADMIN — FUROSEMIDE 80 MG: 10 INJECTION, SOLUTION INTRAMUSCULAR; INTRAVENOUS at 08:02

## 2024-02-04 RX ADMIN — OXYCODONE HYDROCHLORIDE 5 MG: 5 TABLET ORAL at 03:02

## 2024-02-04 RX ADMIN — TACROLIMUS 1 MG: 1 CAPSULE ORAL at 09:02

## 2024-02-04 RX ADMIN — OXYCODONE HYDROCHLORIDE 5 MG: 5 TABLET ORAL at 02:02

## 2024-02-04 RX ADMIN — FUROSEMIDE 40 MG: 10 INJECTION, SOLUTION INTRAVENOUS at 10:02

## 2024-02-04 RX ADMIN — FAMOTIDINE 20 MG: 20 TABLET, FILM COATED ORAL at 08:02

## 2024-02-04 RX ADMIN — ATORVASTATIN CALCIUM 20 MG: 10 TABLET, FILM COATED ORAL at 08:02

## 2024-02-04 RX ADMIN — SUCRALFATE 1 G: 1 TABLET ORAL at 03:02

## 2024-02-04 RX ADMIN — MUPIROCIN: 20 OINTMENT TOPICAL at 10:02

## 2024-02-04 RX ADMIN — SUCRALFATE 1 G: 1 TABLET ORAL at 06:02

## 2024-02-04 RX ADMIN — SUCRALFATE 1 G: 1 TABLET ORAL at 08:02

## 2024-02-04 RX ADMIN — DOCUSATE SODIUM 100 MG: 100 CAPSULE, LIQUID FILLED ORAL at 09:02

## 2024-02-04 RX ADMIN — ESCITALOPRAM OXALATE 10 MG: 10 TABLET ORAL at 09:02

## 2024-02-04 RX ADMIN — FOLIC ACID 1 MG: 1 TABLET ORAL at 09:02

## 2024-02-04 RX ADMIN — HYDROCODONE BITARTRATE AND ACETAMINOPHEN 1 TABLET: 5; 325 TABLET ORAL at 06:02

## 2024-02-04 RX ADMIN — TACROLIMUS 0.5 MG: 0.5 CAPSULE ORAL at 05:02

## 2024-02-04 RX ADMIN — ENOXAPARIN SODIUM 40 MG: 40 INJECTION SUBCUTANEOUS at 05:02

## 2024-02-04 RX ADMIN — HYDROCODONE BITARTRATE AND ACETAMINOPHEN 1 TABLET: 5; 325 TABLET ORAL at 08:02

## 2024-02-04 RX ADMIN — DOCUSATE SODIUM 100 MG: 100 CAPSULE, LIQUID FILLED ORAL at 08:02

## 2024-02-04 RX ADMIN — FAMOTIDINE 20 MG: 20 TABLET, FILM COATED ORAL at 09:02

## 2024-02-04 RX ADMIN — ASPIRIN 81 MG: 81 TABLET, COATED ORAL at 09:02

## 2024-02-04 RX ADMIN — OXYCODONE HYDROCHLORIDE 5 MG: 5 TABLET ORAL at 09:02

## 2024-02-04 NOTE — PROGRESS NOTES
"  [facility]    Cardiology  Progress Note    Patient Name: Radu Rock  MRN: [id number]  Admission Date: 1/31/2024  Hospital Length of Stay: 4 days  Code Status: Full Code   Attending Provider: Juventino Ferrell IV, MD   Consulting Provider: FATOU Muse  Primary Care Physician: Mariella Huntley MD  Principal Problem:<principal problem not specified>    Patient information was obtained from patient, past medical records, and ER records.     Subjective:     Chief Complaint: Reason for Consult: Post CABG     HPI: Mr. Rock is a 65 y/o male who is known to [facility], Dr. Carlos. The patient presented to [facility] on 1.31.24 for a Planned CABG with CV Surgery/Dr. Ferrell. He recently underwent a workup with Nuclear PET which was abnormal, prompting a C with noted MVCAD. He was referred to CV Surgery for CABG Evaluation and she was deemed a candidate for CABG. On 1.31.24 he underwent a CABG with results of LIMA to LAD, rSVG to OM, rSVG to PDA and Bilateral EVH. He tolerated the procedure and was admitted for observation in the ICU and further management. [facility] has been consulted for Post Operative Medical Management.     2.1.24: NAD. "I am good." Sitting in Bedside Chair. Denies SOB and Palps. + CP/Incisional.   2.2.24: NAD. "I am a tad SOB." + SOB, + CP/Incisional. Denies Palps. SBP 90s.HR 100s.  2.3.24: NAD. "I am good." + CP/Incisional. Denies Palps and SOB. + SMITH Only. Remains in SR. Fluid Balance Net Negative 2340mL. Labs Ordered/Not Drawn.   2.4.24: NAD. "I am SOB." + CP/Incisional. + SOB. Remains in SR. Fluid Balance Net Negative 3170mL.    PMH: HLD, HTN, MVCAD/CABG, Liver Transplant, Liver Transplant/Cirrhosis, Liver Cancer, HCV/Hepatitis C Virus, MI, Obesity, Osteoporosis, TAY/CPAP  PSH: Liver Transplant, CABG, Angiogram, Cholecystectomy, Appendectomy, LE Surgery, Shoulder Surgery, EGD  Family History: Denies Family History of Heart Disease  Social History: Denies Illicit Drug, ETOH and Tobacco " Use    Previous Cardiac Diagnostics:   CABG 1.31.24:  LIMA to LAD, rSVG to OM, rSVG to PDA, Bilateral EVH.    C 1.22.24:  Procedures performed:  1. Ultrasound-guided right radial access  2. Coronary angiography  3. Left ventricular hemodynamics  4. IFR interrogation  -LAD 0.82  -circumflex 0.88  -PDA 0.91  5. LV EDP-19  6. Lima-angio  Findings:  1. Left main-normal  2. Lad-60-70% lad mid ISR involving large diagonal, IFR positive, 0.82 in LAD  3. LCX-non dominant, 80% proximal lesion extending into OM2, OM1 70% ostial lesion  4. RCA-dominant, 60% proximal lesion, 80% ostial PDA  5. LIMA patent  Plan:  1. Maximize medical management  2. Consult CTS for bypass evaluation.  If patient is not a candidate will need Laser atherectomy of LAD stent and PCI of Mid LAD, Circumlex, and possibly PDA.  Would re-evalaute PDA at time of PCI, but I think it is bad enough for intervention.  Inferoapical walls are involved on stress test.    PET 1.19.24:  This is an abnormal perfusion study.   This scan is suggestive of moderate to high risk for future cardiovascular events.   Small partially reversible perfusion abnormality of severe intensity in the apical segment. Small reversible perfusion abnormality of moderate intensity in the apical anterior segment. Small partially reversible perfusion abnormality of severe intensity in the apical septal segment. Small partially reversible perfusion abnormality of severe intensity in the apical inferior segment.   The left ventricular cavity is noted to be normal on the stress studies. The stress left ventricular ejection fraction was calculated to be 64% and left ventricular global function is normal. The rest left ventricular cavity is noted to be normal. The rest left ventricular ejection fraction was calculated to be 64% and rest left ventricular global function is normal.   Compared with rest and stress studies the ejection fraction remains unchanged (64).   The study quality is  average.   There was a rise in myocardial blood flow between rest and stress.  Global myocardial blood flow reserve was 3.01.  Normal global coronary flow reserve is suggestive of low coronary event risk.    ECHO 1.10.24:  The left ventricle is normal in size with moderate to severe, asymmetric septal left ventricular hypertrophy and mild, asymmetric posterior left ventricular hypertrophy and normal left ventricular systolic function. The left ventricular ejection fraction is 65%. Left ventricular diastolic function is normal. Bradycardia is noted.  Mild dilatation of the aortic root is noted at 3.9 cms. The aortic arch is normal at 3.1 cms.   Unable to adequately visualize and assess the pulmonary artery.  Poor short axis echo windows.  The study quality is average.     Review of patient's allergies indicates:  No Known Allergies    No current facility-administered medications on file prior to encounter.     Current Outpatient Medications on File Prior to Encounter   Medication Sig    alendronate (FOSAMAX) 35 MG tablet Take 35 mg by mouth every 7 days. Mondays    amLODIPine (NORVASC) 5 MG tablet Take 5 mg by mouth nightly.    aspirin (ECOTRIN) 81 MG EC tablet Take 81 mg by mouth once.    atorvastatin (LIPITOR) 20 MG tablet Take 20 mg by mouth every evening.    EScitalopram oxalate (LEXAPRO) 10 MG tablet Take 10 mg by mouth every morning.    magnesium oxide (MAG-OX) 400 mg (241.3 mg magnesium) tablet Take 400 mg by mouth 2 (two) times daily.    metoprolol tartrate (LOPRESSOR) 25 MG tablet Take 25 mg by mouth 2 (two) times daily.    olmesartan (BENICAR) 20 MG tablet Take 20 mg by mouth once daily.    rifAXIMin (XIFAXAN) 550 mg Tab Take 550 mg by mouth 2 (two) times daily.    tacrolimus (PROGRAF) 1 MG Cap Take 1 mg by mouth. 1 mg in the am and 0.5mg in the pm    cholecalciferol, vitamin D3, 1,250 mcg (50,000 unit) capsule Take 50,000 Units by mouth every 7 days. Monday    clopidogreL (PLAVIX) 75 mg tablet Take 75 mg  by mouth once daily.     Review of Systems   Constitutional:  Positive for fatigue.   Respiratory:  Positive for shortness of breath.    Cardiovascular:  Positive for chest pain (Incisional). Negative for palpitations and leg swelling.   All other systems reviewed and are negative.    Objective:     Vital Signs (Most Recent):  Temp: 98.1 °F (36.7 °C) (02/04/24 1221)  Pulse: 92 (02/04/24 1221)  Resp: 17 (02/04/24 1221)  BP: 107/72 (02/04/24 1221)  SpO2: (!) 94 % (02/04/24 1221) Vital Signs (24h Range):  Temp:  [98.1 °F (36.7 °C)-99.5 °F (37.5 °C)] 98.1 °F (36.7 °C)  Pulse:  [] 92  Resp:  [17-20] 17  SpO2:  [80 %-94 %] 94 %  BP: ()/(65-72) 107/72     Weight: 103.5 kg (228 lb 2.8 oz)  Body mass index is 32.74 kg/m².    SpO2: (!) 94 %         Intake/Output Summary (Last 24 hours) at 2/4/2024 1303  Last data filed at 2/4/2024 0957  Gross per 24 hour   Intake 1437 ml   Output 2870 ml   Net -1433 ml       Lines/Drains/Airways       Drain  Duration                  Chest Tube 01/31/24 0933 Mediastinal 24 Fr. 4 days              Peripheral Intravenous Line  Duration                  Peripheral IV - Single Lumen 01/31/24 0540 18 G Anterior;Distal;Left Forearm 4 days                  Significant Labs:  Recent Results (from the past 72 hour(s))   Magnesium    Collection Time: 02/02/24  2:17 AM   Result Value Ref Range    Magnesium Level 2.10 1.60 - 2.60 mg/dL   CBC Without Differential    Collection Time: 02/02/24  2:17 AM   Result Value Ref Range    WBC 14.67 (H) 4.50 - 11.50 x10(3)/mcL    RBC 2.96 (L) 4.70 - 6.10 x10(6)/mcL    Hgb 8.9 (L) 14.0 - 18.0 g/dL    Hct 28.3 (L) 42.0 - 52.0 %    MCV 95.6 (H) 80.0 - 94.0 fL    MCH 30.1 27.0 - 31.0 pg    MCHC 31.4 (L) 33.0 - 36.0 g/dL    RDW 14.9 11.5 - 17.0 %    Platelet 82 (L) 130 - 400 x10(3)/mcL    MPV 13.0 (H) 7.4 - 10.4 fL    IPF 17.1 (H) 0.9 - 11.2 %    NRBC% 0.0 %   Comprehensive Metabolic Panel    Collection Time: 02/02/24  2:17 AM   Result Value Ref Range     Sodium Level 136 136 - 145 mmol/L    Potassium Level 4.6 3.5 - 5.1 mmol/L    Chloride 102 98 - 107 mmol/L    Carbon Dioxide 23 23 - 31 mmol/L    Glucose Level 114 82 - 115 mg/dL    Blood Urea Nitrogen 19.7 8.4 - 25.7 mg/dL    Creatinine 1.75 (H) 0.73 - 1.18 mg/dL    Calcium Level Total 7.6 (L) 8.8 - 10.0 mg/dL    Protein Total 5.3 (L) 5.8 - 7.6 gm/dL    Albumin Level 3.3 (L) 3.4 - 4.8 g/dL    Globulin 2.0 (L) 2.4 - 3.5 gm/dL    Albumin/Globulin Ratio 1.7 1.1 - 2.0 ratio    Bilirubin Total 1.1 <=1.5 mg/dL    Alkaline Phosphatase 45 40 - 150 unit/L    Alanine Aminotransferase 21 0 - 55 unit/L    Aspartate Aminotransferase 33 5 - 34 unit/L    eGFR 42 mls/min/1.73/m2   Comprehensive Metabolic Panel    Collection Time: 02/03/24 12:37 PM   Result Value Ref Range    Sodium Level 133 (L) 136 - 145 mmol/L    Potassium Level 4.5 3.5 - 5.1 mmol/L    Chloride 95 (L) 98 - 107 mmol/L    Carbon Dioxide 25 23 - 31 mmol/L    Glucose Level 162 (H) 82 - 115 mg/dL    Blood Urea Nitrogen 21.0 8.4 - 25.7 mg/dL    Creatinine 1.53 (H) 0.73 - 1.18 mg/dL    Calcium Level Total 8.1 (L) 8.8 - 10.0 mg/dL    Protein Total 6.6 5.8 - 7.6 gm/dL    Albumin Level 3.1 (L) 3.4 - 4.8 g/dL    Globulin 3.5 2.4 - 3.5 gm/dL    Albumin/Globulin Ratio 0.9 (L) 1.1 - 2.0 ratio    Bilirubin Total 1.1 <=1.5 mg/dL    Alkaline Phosphatase 70 40 - 150 unit/L    Alanine Aminotransferase 20 0 - 55 unit/L    Aspartate Aminotransferase 30 5 - 34 unit/L    eGFR 50 mls/min/1.73/m2   Magnesium    Collection Time: 02/03/24 12:37 PM   Result Value Ref Range    Magnesium Level 2.30 1.60 - 2.60 mg/dL   CBC with Differential    Collection Time: 02/03/24 12:37 PM   Result Value Ref Range    WBC 11.00 4.50 - 11.50 x10(3)/mcL    RBC 2.82 (L) 4.70 - 6.10 x10(6)/mcL    Hgb 8.5 (L) 14.0 - 18.0 g/dL    Hct 26.5 (L) 42.0 - 52.0 %    MCV 94.0 80.0 - 94.0 fL    MCH 30.1 27.0 - 31.0 pg    MCHC 32.1 (L) 33.0 - 36.0 g/dL    RDW 14.6 11.5 - 17.0 %    Platelet 96 (L) 130 - 400 x10(3)/mcL     MPV 13.2 (H) 7.4 - 10.4 fL    Neut % 79.2 %    Lymph % 10.9 %    Mono % 8.3 %    Eos % 0.8 %    Basophil % 0.3 %    Lymph # 1.20 0.6 - 4.6 x10(3)/mcL    Neut # 8.71 2.1 - 9.2 x10(3)/mcL    Mono # 0.91 0.1 - 1.3 x10(3)/mcL    Eos # 0.09 0 - 0.9 x10(3)/mcL    Baso # 0.03 <=0.2 x10(3)/mcL    IG# 0.06 (H) 0 - 0.04 x10(3)/mcL    IG% 0.5 %    NRBC% 0.0 %    IPF 18.1 (H) 0.9 - 11.2 %   Comprehensive Metabolic Panel    Collection Time: 02/04/24  7:12 AM   Result Value Ref Range    Sodium Level 135 (L) 136 - 145 mmol/L    Potassium Level 4.4 3.5 - 5.1 mmol/L    Chloride 98 98 - 107 mmol/L    Carbon Dioxide 28 23 - 31 mmol/L    Glucose Level 121 (H) 82 - 115 mg/dL    Blood Urea Nitrogen 22.0 8.4 - 25.7 mg/dL    Creatinine 1.41 (H) 0.73 - 1.18 mg/dL    Calcium Level Total 7.7 (L) 8.8 - 10.0 mg/dL    Protein Total 5.7 (L) 5.8 - 7.6 gm/dL    Albumin Level 3.0 (L) 3.4 - 4.8 g/dL    Globulin 2.7 2.4 - 3.5 gm/dL    Albumin/Globulin Ratio 1.1 1.1 - 2.0 ratio    Bilirubin Total 1.0 <=1.5 mg/dL    Alkaline Phosphatase 89 40 - 150 unit/L    Alanine Aminotransferase 21 0 - 55 unit/L    Aspartate Aminotransferase 34 5 - 34 unit/L    eGFR 55 mls/min/1.73/m2   Magnesium    Collection Time: 02/04/24  7:12 AM   Result Value Ref Range    Magnesium Level 2.50 1.60 - 2.60 mg/dL   CBC with Differential    Collection Time: 02/04/24  7:12 AM   Result Value Ref Range    WBC 8.47 4.50 - 11.50 x10(3)/mcL    RBC 2.78 (L) 4.70 - 6.10 x10(6)/mcL    Hgb 8.5 (L) 14.0 - 18.0 g/dL    Hct 25.2 (L) 42.0 - 52.0 %    MCV 90.6 80.0 - 94.0 fL    MCH 30.6 27.0 - 31.0 pg    MCHC 33.7 33.0 - 36.0 g/dL    RDW 14.8 11.5 - 17.0 %    Platelet 124 (L) 130 - 400 x10(3)/mcL    MPV 12.6 (H) 7.4 - 10.4 fL    Neut % 72.4 %    Lymph % 13.7 %    Mono % 11.1 %    Eos % 2.0 %    Basophil % 0.2 %    Lymph # 1.16 0.6 - 4.6 x10(3)/mcL    Neut # 6.13 2.1 - 9.2 x10(3)/mcL    Mono # 0.94 0.1 - 1.3 x10(3)/mcL    Eos # 0.17 0 - 0.9 x10(3)/mcL    Baso # 0.02 <=0.2 x10(3)/mcL    IG# 0.05  (H) 0 - 0.04 x10(3)/mcL    IG% 0.6 %    NRBC% 0.0 %     Telemetry: SR    Physical Exam  Constitutional:       General: He is not in acute distress.     Appearance: Normal appearance.   HENT:      Head: Normocephalic.      Mouth/Throat:      Mouth: Mucous membranes are moist.   Eyes:      Conjunctiva/sclera: Conjunctivae normal.   Cardiovascular:      Rate and Rhythm: Normal rate and regular rhythm.      Pulses: Normal pulses.      Heart sounds: Normal heart sounds. No murmur heard.  Pulmonary:      Effort: Pulmonary effort is normal.      Breath sounds: Rhonchi and rales present.      Comments: NC O2  Abdominal:      Palpations: Abdomen is soft.   Musculoskeletal:      Right lower leg: No edema.      Left lower leg: No edema.   Skin:     General: Skin is warm and dry.      Comments: Midline Sternotomy Dressing C/D/I   Neurological:      General: No focal deficit present.      Mental Status: He is alert and oriented to person, place, and time. Mental status is at baseline.   Psychiatric:         Mood and Affect: Mood normal.         Behavior: Behavior normal.       Home Medications:   No current facility-administered medications on file prior to encounter.     Current Outpatient Medications on File Prior to Encounter   Medication Sig Dispense Refill    alendronate (FOSAMAX) 35 MG tablet Take 35 mg by mouth every 7 days. Mondays      amLODIPine (NORVASC) 5 MG tablet Take 5 mg by mouth nightly.      aspirin (ECOTRIN) 81 MG EC tablet Take 81 mg by mouth once.      atorvastatin (LIPITOR) 20 MG tablet Take 20 mg by mouth every evening.      EScitalopram oxalate (LEXAPRO) 10 MG tablet Take 10 mg by mouth every morning.      magnesium oxide (MAG-OX) 400 mg (241.3 mg magnesium) tablet Take 400 mg by mouth 2 (two) times daily.      metoprolol tartrate (LOPRESSOR) 25 MG tablet Take 25 mg by mouth 2 (two) times daily.      olmesartan (BENICAR) 20 MG tablet Take 20 mg by mouth once daily.      rifAXIMin (XIFAXAN) 550 mg Tab Take  550 mg by mouth 2 (two) times daily.      tacrolimus (PROGRAF) 1 MG Cap Take 1 mg by mouth. 1 mg in the am and 0.5mg in the pm      cholecalciferol, vitamin D3, 1,250 mcg (50,000 unit) capsule Take 50,000 Units by mouth every 7 days. Monday      clopidogreL (PLAVIX) 75 mg tablet Take 75 mg by mouth once daily.       Current Inpatient Medications:    Current Facility-Administered Medications:     acetaminophen oral solution 650 mg, 650 mg, Per OG tube, Q6H PRN, Theron Morris PA-C    albumin human 5% bottle 12.5 g, 12.5 g, Intravenous, PRN, Theron Morris PA-C, Stopped at 01/31/24 1302    aspirin EC tablet 81 mg, 81 mg, Oral, Daily, Theron Morris PA-C, 81 mg at 02/04/24 0951    atorvastatin tablet 20 mg, 20 mg, Oral, QHS, Johan Ball ANP, 20 mg at 02/03/24 2015    calcium gluconate 1 g in NS IVPB (premixed), 1 g, Intravenous, PRN, Theron Morris PA-C    calcium gluconate 1 g in NS IVPB (premixed), 2 g, Intravenous, PRN, Theron Morris PA-C    calcium gluconate 1 g in NS IVPB (premixed), 3 g, Intravenous, PRN, Theron Morris PA-C    dextrose 10% bolus 125 mL 125 mL, 12.5 g, Intravenous, PRN, Theron Morris PA-C    dextrose 10% bolus 250 mL 250 mL, 25 g, Intravenous, PRN, Theron Morris PA-C    docusate sodium capsule 100 mg, 100 mg, Oral, BID, Theron Morris PA-C, 100 mg at 02/04/24 0951    enoxaparin injection 40 mg, 40 mg, Subcutaneous, Daily, Theron Morris PA-C, 40 mg at 02/03/24 1610    EScitalopram oxalate tablet 10 mg, 10 mg, Oral, Daily, Juventino Ferrell IV, MD, 10 mg at 02/04/24 0951    famotidine tablet 20 mg, 20 mg, Oral, BID, Kristie Cook MD, 20 mg at 02/04/24 0951    folic acid tablet 1 mg, 1 mg, Oral, Daily, Theron Morris PA-C, 1 mg at 02/04/24 0951    furosemide injection 80 mg, 80 mg, Intravenous, Q12H, Johan Ball, ANP    HYDROcodone-acetaminophen 5-325 mg per tablet 1 tablet, 1 tablet, Oral, Q4H PRN, Theron Morris PA-C, 1 tablet at 02/04/24 0609     lactulose 10 gram/15 ml solution 20 g, 20 g, Oral, Q6H PRN, Theron Morris PA-C    loperamide capsule 2 mg, 2 mg, Oral, Continuous PRN, Theron Morris PA-C    magnesium sulfate 2g in water 50mL IVPB (premix), 2 g, Intravenous, PRN, Theron Morris PA-C    magnesium sulfate 2g in water 50mL IVPB (premix), 4 g, Intravenous, PRN, Theron Morris PA-C    metoclopramide injection 5 mg, 5 mg, Intravenous, Q6H PRN, Theron Morris PA-C    morphine injection 4 mg, 4 mg, Intravenous, Q4H PRN, Theron Morris PA-C, 4 mg at 01/31/24 1051    mupirocin 2 % ointment, , Nasal, BID, Theron Morris PA-C, Given at 02/04/24 1004    ondansetron injection 4 mg, 4 mg, Intravenous, Q4H PRN, Theron Morris PA-C, 4 mg at 01/31/24 2044    oxyCODONE immediate release tablet 5 mg, 5 mg, Oral, Q4H PRN, Juventino Ferrell IV, MD, 5 mg at 02/04/24 0951    potassium chloride 20 mEq in 100 mL IVPB (FOR CENTRAL LINE ADMINISTRATION ONLY), 20 mEq, Intravenous, PRN, Theron Morris PA-C    potassium chloride 20 mEq in 100 mL IVPB (FOR CENTRAL LINE ADMINISTRATION ONLY), 40 mEq, Intravenous, PRN, Theron Morris PA-C    potassium chloride 20 mEq in 100 mL IVPB (FOR CENTRAL LINE ADMINISTRATION ONLY), 60 mEq, Intravenous, PRN, Theron Morris PA-C    sodium phosphate 15 mmol in dextrose 5 % (D5W) 250 mL IVPB, 15 mmol, Intravenous, PRN, Theron Morris PA-C    sodium phosphate 20.01 mmol in dextrose 5 % (D5W) 250 mL IVPB, 20.01 mmol, Intravenous, PRN, Theron Morris PA-C    sodium phosphate 30 mmol in dextrose 5 % (D5W) 250 mL IVPB, 30 mmol, Intravenous, PRN, Theron Morris PA-C    sucralfate tablet 1 g, 1 g, Oral, QID (AC & HS), Theron Morris PA-C, 1 g at 02/04/24 0609    tacrolimus capsule 0.5 mg, 0.5 mg, Oral, Daily PM, Jacklyn Sam FNP, 0.5 mg at 02/03/24 1748    tacrolimus capsule 1 mg, 1 mg, Oral, Daily AM, Josh Solano MD, 1 mg at 02/04/24 0950    VTE Risk Mitigation (From admission, onward)            Ordered     enoxaparin injection 40 mg  Daily         01/31/24 1008     Place sequential compression device  Until discontinued         01/31/24 1008     IP VTE HIGH RISK PATIENT  Once         01/31/24 0840                  Assessment:   MVCAD    - CABG (1.31.24) - LIMA to LAD, rSVG to OM, rSVG to PDA, Bilateral EVH    - LHC (1.22.24) - 1. Left main-normal 2. Lad-60-70% lad mid ISR involving large diagonal, IFR positive, 0.82 in LAD 3. LCX-non dominant, 80% proximal lesion extending into OM2, OM1 70% ostial lesion 4. RCA-dominant, 60% proximal lesion, 80% ostial PDA 5. LIMA patent    - ECHO 1.10.24 - LVEF 65%  Acute on Chronic Diastolic HF/EF 65%     - CXR (2.2.24) - Pulmonary Vascular Congestion   Febrile   BRIAN   HTN - Borderline BP   HLD  Cirrhosis/Liver Cancer/HCV s/p Liver Transplant  Anemia   Hx of MI  Obesity  Osteoporosis  TAY/CPAP  Leukocytosis   Thrombocytopenia   No Hx of GIB     Plan:   Continue ASA and Statin  Hx of Liver Transplant/Cautious Use of Statin - Monitor LFTs  D/C BB to Give Room for Diuresis/Will Add GDMT Back when BP Allows  Pending Morning Lab Draw   Increase Lasix to 80mg IVP BID   Accurate I&Os and Daily Weights   Encourage Aggressive Mobilization of PT and Q1HR IS  Labs in AM: CBC, CMP and Mg    FATOU Muse  Cardiology  [facility]  02/04/2024     I agree with the findings of the complexity of problems addressed and take responsibility for the plan's risks and complications. I approved the plan documented by Johan Ball NP.  Chest tube removed this morning  Patient remains fluid overloaded  We will increase Lasix to 80 mg twice daily intravenously, we will switch to oral when euvolemic  Tentative discharge in the next 2-3 days, once euvolemic  Continue to closely monitor renal function and electrolytes as the patient is on IV diuretics

## 2024-02-04 NOTE — PROGRESS NOTES
CT SURGERY PROGRESS NOTE  Radu Rock  64 y.o.  1960    Patients Procedure: Procedure(s) (LRB):  CORONARY ARTERY BYPASS GRAFT (CABG) (N/A)  SURGICAL PROCUREMENT, VEIN, ENDOSCOPIC (Bilateral)    Subjective  Interval History: Patient is postoperative day 4 from CABG.  No acute events overnight.  Up in chair, wife at bedside.  Remains on supplemental oxygen - currently on 5 L oxymask.  He denies any shortness of breath at rest but does endorse some mild dyspnea on exertion.  Also continues to complain of some mild chest discomfort.      Review of Systems   Constitutional:  Positive for malaise/fatigue. Negative for chills and fever.   Respiratory:  Positive for shortness of breath (on exertion). Negative for cough and wheezing.    Cardiovascular:  Positive for chest pain (incisional) and leg swelling. Negative for palpitations.   Gastrointestinal:  Negative for nausea and vomiting.       Medication List  Infusions   loperamide       Scheduled   aspirin  81 mg Oral Daily    atorvastatin  20 mg Oral QHS    docusate sodium  100 mg Oral BID    enoxparin  40 mg Subcutaneous Daily    EScitalopram oxalate  10 mg Oral Daily    famotidine  20 mg Oral BID    folic acid  1 mg Oral Daily    furosemide (LASIX) injection  80 mg Intravenous Q12H    mupirocin   Nasal BID    sucralfate  1 g Oral QID (AC & HS)    tacrolimus  0.5 mg Oral Daily PM    tacrolimus  1 mg Oral Daily AM       Objective:  Recent Vitals:  Temp:  [98.1 °F (36.7 °C)-99.5 °F (37.5 °C)] 98.1 °F (36.7 °C)  Pulse:  [] 92  Resp:  [17-20] 17  SpO2:  [80 %-94 %] 94 %  BP: ()/(65-72) 107/72    Physical Exam  Constitutional:       General: He is not in acute distress.     Appearance: He is obese. He is not ill-appearing.   Cardiovascular:      Rate and Rhythm: Normal rate and regular rhythm.      Pulses: Normal pulses.      Heart sounds: Normal heart sounds. No murmur heard.     No friction rub. No gallop.   Pulmonary:      Effort: Pulmonary effort  is normal. No respiratory distress.      Breath sounds: Rhonchi present. No rales.      Comments: Chest tube in place  Abdominal:      General: There is no distension.      Palpations: Abdomen is soft.   Musculoskeletal: Generalized swelling     Right lower leg: Edema present.      Left lower leg: Edema present.   Skin:     General: Skin is warm and dry.      Comments: Median sternotomy incision c/d/i   Neurological:      General: No focal deficit present.      Mental Status: He is alert and oriented to person, place, and time.   Psychiatric:         Mood and Affect: Mood normal.         Behavior: Behavior normal.     I/O last 24 hrs:  Intake/Output - Last 3 Shifts         02/02 0700  02/03 0659 02/03 0700 02/04 0659 02/04 0700 02/05 0659    P.O. 200 1200 237    I.V. (mL/kg)       IV Piggyback       Total Intake(mL/kg) 200 (1.9) 1200 (11.6) 237 (2.3)    Urine (mL/kg/hr) 2400 (0.9) 4350 (1.8) 400 (0.6)    Stool  0     Chest Tube 140 20     Total Output 2540 4370 400    Net -2340 -3170 -163           Stool Occurrence  0 x             Labs  CBC:   Recent Labs   Lab 02/04/24  0712   WBC 8.47   RBC 2.78*   HGB 8.5*   HCT 25.2*   *   MCV 90.6   MCH 30.6   MCHC 33.7     CMP:   Recent Labs   Lab 02/04/24  0712   CALCIUM 7.7*   ALBUMIN 3.0*   *   K 4.4   CO2 28   BUN 22.0   CREATININE 1.41*   ALKPHOS 89   ALT 21   AST 34   BILITOT 1.0     LFTs:   Recent Labs   Lab 02/04/24  0712   ALT 21   AST 34   ALKPHOS 89   BILITOT 1.0   ALBUMIN 3.0*     All pertinent labs from the last 24 hours have been reviewed.      Imaging:   CXR: X-Ray Chest 1 View    Result Date: 2/4/2024  No significant interval changes. Electronically signed by: Carlos Burgess MD Date:    02/04/2024 Time:    08:16   I have reviewed all pertinent imaging results/findings within the past 24 hours.        ASSESSMENT/PLAN:    Coronary artery disease  -s/p CABG  Acute on chronic diastolic heart failure   HTN  BRIAN  Cirrhosis / Liver cancer / HCV s/p  Liver transplant    -Ct output: 30 cc / 24 hrs.  Serosanguinous.  Discontinue chest tube  -CXR with small left pleural effusion; otherwise stable.  Repeat tomorrow morning.   -H&H 8.5 / 25.2.  Patient edematous. Likely related to volume overload.  Discussed with [facility] - considering increasing lasix dose to 80 mg BID. Appreciate their assistance   -Renal indices stable - Cr 1.41  -LFTs also remain stable.  Will continue to follow.  -Continue progressive mobilization and regular IS use  -Wean oxygen as tolerated     Case and plan of care discussed with Dr. Aki Ash, F[facility]

## 2024-02-04 NOTE — PROGRESS NOTES
02/04/24 0920   Pre Exercise Vitals   /87   Pulse 84   Supplemental O2? Yes   O2 Device oxymask   O2 Flow (L/min) 5   SpO2 94 %   During Exercise Vitals   Pulse 102   Supplemental O2? Yes   O2 Device oxymask   O2 Flow (L/min) 6   SpO2   (88-91)   Distance Walked 150 feet   Post Exercise Vitals   /71   Pulse 96   Supplemental O2? Yes   O2 Device oxymask   O2 Flow (L/min) 5   SpO2 96 %   Modality   Modality   (rollator)     Standby assist from sitting to standing. Sternal precautions maintained. Gait steady and strong. Encouraged brief standing rest break for o2 sat 88%. Tolerated well. Encouraged incentive spirometer q 1 hour. Communicated with nurse pre and post walk.

## 2024-02-04 NOTE — PROGRESS NOTES
02/04/24 1410   Pre Exercise Vitals   /68   Pulse 84   Supplemental O2? Yes   O2 Device oxymask   O2 Flow (L/min) 5   SpO2 97 %   During Exercise Vitals   Pulse 114   Supplemental O2? Yes   O2 Device oxymask   O2 Flow (L/min) 4   SpO2   (88-92)   Distance Walked 200 feet   Post Exercise Vitals   /76   Pulse 96   Supplemental O2? Yes   O2 Device oxymask   O2 Flow (L/min) 5   SpO2 95 %   Modality   Modality   (rollator)     Communicated with nurse pre and post walk. Min assist x1 from sitting to standing. Sternal precautions maintained. Gait steady and strong. One brief standing rest break needed for mild sob. Encouraged incentive spirometer q 1 hour. Performed max 1500.

## 2024-02-05 LAB
ABO + RH BLD: NORMAL
ALBUMIN SERPL-MCNC: 3.2 G/DL (ref 3.4–4.8)
ALBUMIN/GLOB SERPL: 1.1 RATIO (ref 1.1–2)
ALP SERPL-CCNC: 129 UNIT/L (ref 40–150)
ALT SERPL-CCNC: 47 UNIT/L (ref 0–55)
AST SERPL-CCNC: 47 UNIT/L (ref 5–34)
BASOPHILS # BLD AUTO: 0.05 X10(3)/MCL
BASOPHILS NFR BLD AUTO: 0.5 %
BILIRUB SERPL-MCNC: 1.2 MG/DL
BLD PROD TYP BPU: NORMAL
BLOOD UNIT EXPIRATION DATE: NORMAL
BLOOD UNIT TYPE CODE: 5100
BUN SERPL-MCNC: 23.6 MG/DL (ref 8.4–25.7)
CALCIUM SERPL-MCNC: 8.1 MG/DL (ref 8.8–10)
CHLORIDE SERPL-SCNC: 97 MMOL/L (ref 98–107)
CO2 SERPL-SCNC: 30 MMOL/L (ref 23–31)
CREAT SERPL-MCNC: 1.57 MG/DL (ref 0.73–1.18)
CROSSMATCH INTERPRETATION: NORMAL
DISPENSE STATUS: NORMAL
EOSINOPHIL # BLD AUTO: 0.37 X10(3)/MCL (ref 0–0.9)
EOSINOPHIL NFR BLD AUTO: 3.9 %
ERYTHROCYTE [DISTWIDTH] IN BLOOD BY AUTOMATED COUNT: 14.6 % (ref 11.5–17)
GFR SERPLBLD CREATININE-BSD FMLA CKD-EPI: 48 MLS/MIN/1.73/M2
GLOBULIN SER-MCNC: 3 GM/DL (ref 2.4–3.5)
GLUCOSE SERPL-MCNC: 125 MG/DL (ref 82–115)
HCT VFR BLD AUTO: 30.1 % (ref 42–52)
HGB BLD-MCNC: 9.7 G/DL (ref 14–18)
IMM GRANULOCYTES # BLD AUTO: 0.06 X10(3)/MCL (ref 0–0.04)
IMM GRANULOCYTES NFR BLD AUTO: 0.6 %
LYMPHOCYTES # BLD AUTO: 1.69 X10(3)/MCL (ref 0.6–4.6)
LYMPHOCYTES NFR BLD AUTO: 17.6 %
MAGNESIUM SERPL-MCNC: 2.5 MG/DL (ref 1.6–2.6)
MCH RBC QN AUTO: 29.8 PG (ref 27–31)
MCHC RBC AUTO-ENTMCNC: 32.2 G/DL (ref 33–36)
MCV RBC AUTO: 92.6 FL (ref 80–94)
MONOCYTES # BLD AUTO: 1.06 X10(3)/MCL (ref 0.1–1.3)
MONOCYTES NFR BLD AUTO: 11 %
NEUTROPHILS # BLD AUTO: 6.37 X10(3)/MCL (ref 2.1–9.2)
NEUTROPHILS NFR BLD AUTO: 66.4 %
NRBC BLD AUTO-RTO: 0.2 %
OHS QRS DURATION: 90 MS
OHS QRS DURATION: 94 MS
OHS QTC CALCULATION: 510 MS
OHS QTC CALCULATION: 525 MS
PLATELET # BLD AUTO: 173 X10(3)/MCL (ref 130–400)
PMV BLD AUTO: 11.6 FL (ref 7.4–10.4)
POTASSIUM SERPL-SCNC: 4.1 MMOL/L (ref 3.5–5.1)
PROT SERPL-MCNC: 6.2 GM/DL (ref 5.8–7.6)
RBC # BLD AUTO: 3.25 X10(6)/MCL (ref 4.7–6.1)
SODIUM SERPL-SCNC: 139 MMOL/L (ref 136–145)
UNIT NUMBER: NORMAL
WBC # SPEC AUTO: 9.6 X10(3)/MCL (ref 4.5–11.5)

## 2024-02-05 PROCEDURE — 63600175 PHARM REV CODE 636 W HCPCS

## 2024-02-05 PROCEDURE — 97110 THERAPEUTIC EXERCISES: CPT

## 2024-02-05 PROCEDURE — 21400001 HC TELEMETRY ROOM

## 2024-02-05 PROCEDURE — 25000003 PHARM REV CODE 250

## 2024-02-05 PROCEDURE — 80053 COMPREHEN METABOLIC PANEL: CPT | Performed by: NURSE PRACTITIONER

## 2024-02-05 PROCEDURE — 85025 COMPLETE CBC W/AUTO DIFF WBC: CPT | Performed by: NURSE PRACTITIONER

## 2024-02-05 PROCEDURE — 63600175 PHARM REV CODE 636 W HCPCS: Performed by: NURSE PRACTITIONER

## 2024-02-05 PROCEDURE — 94760 N-INVAS EAR/PLS OXIMETRY 1: CPT

## 2024-02-05 PROCEDURE — 27000221 HC OXYGEN, UP TO 24 HOURS

## 2024-02-05 PROCEDURE — 83735 ASSAY OF MAGNESIUM: CPT | Performed by: NURSE PRACTITIONER

## 2024-02-05 PROCEDURE — 25000003 PHARM REV CODE 250: Performed by: SPECIALIST

## 2024-02-05 PROCEDURE — 63600175 PHARM REV CODE 636 W HCPCS: Performed by: INTERNAL MEDICINE

## 2024-02-05 PROCEDURE — 25000003 PHARM REV CODE 250: Performed by: PHYSICIAN ASSISTANT

## 2024-02-05 PROCEDURE — 93005 ELECTROCARDIOGRAM TRACING: CPT

## 2024-02-05 PROCEDURE — 93010 ELECTROCARDIOGRAM REPORT: CPT | Mod: 76,,, | Performed by: STUDENT IN AN ORGANIZED HEALTH CARE EDUCATION/TRAINING PROGRAM

## 2024-02-05 PROCEDURE — 99024 POSTOP FOLLOW-UP VISIT: CPT | Mod: POP,,,

## 2024-02-05 RX ORDER — DIGOXIN 0.25 MG/ML
500 INJECTION INTRAMUSCULAR; INTRAVENOUS ONCE
Status: DISCONTINUED | OUTPATIENT
Start: 2024-02-05 | End: 2024-02-05

## 2024-02-05 RX ORDER — FUROSEMIDE 40 MG/1
40 TABLET ORAL DAILY
Status: DISCONTINUED | OUTPATIENT
Start: 2024-02-06 | End: 2024-02-06 | Stop reason: HOSPADM

## 2024-02-05 RX ORDER — METOPROLOL TARTRATE 25 MG/1
25 TABLET, FILM COATED ORAL 2 TIMES DAILY
Status: DISCONTINUED | OUTPATIENT
Start: 2024-02-05 | End: 2024-02-06

## 2024-02-05 RX ADMIN — SUCRALFATE 1 G: 1 TABLET ORAL at 05:02

## 2024-02-05 RX ADMIN — ESCITALOPRAM OXALATE 10 MG: 10 TABLET ORAL at 09:02

## 2024-02-05 RX ADMIN — METOPROLOL TARTRATE 25 MG: 25 TABLET, FILM COATED ORAL at 08:02

## 2024-02-05 RX ADMIN — APIXABAN 5 MG: 5 TABLET, FILM COATED ORAL at 01:02

## 2024-02-05 RX ADMIN — HYDROCODONE BITARTRATE AND ACETAMINOPHEN 1 TABLET: 5; 325 TABLET ORAL at 07:02

## 2024-02-05 RX ADMIN — HYDROCODONE BITARTRATE AND ACETAMINOPHEN 1 TABLET: 5; 325 TABLET ORAL at 05:02

## 2024-02-05 RX ADMIN — ASPIRIN 81 MG: 81 TABLET, COATED ORAL at 09:02

## 2024-02-05 RX ADMIN — HYDROCODONE BITARTRATE AND ACETAMINOPHEN 1 TABLET: 5; 325 TABLET ORAL at 09:02

## 2024-02-05 RX ADMIN — FAMOTIDINE 20 MG: 20 TABLET, FILM COATED ORAL at 08:02

## 2024-02-05 RX ADMIN — TACROLIMUS 1 MG: 1 CAPSULE ORAL at 09:02

## 2024-02-05 RX ADMIN — SUCRALFATE 1 G: 1 TABLET ORAL at 09:02

## 2024-02-05 RX ADMIN — FOLIC ACID 1 MG: 1 TABLET ORAL at 09:02

## 2024-02-05 RX ADMIN — FAMOTIDINE 20 MG: 20 TABLET, FILM COATED ORAL at 09:02

## 2024-02-05 RX ADMIN — FUROSEMIDE 80 MG: 10 INJECTION, SOLUTION INTRAMUSCULAR; INTRAVENOUS at 09:02

## 2024-02-05 RX ADMIN — SUCRALFATE 1 G: 1 TABLET ORAL at 08:02

## 2024-02-05 RX ADMIN — METOPROLOL TARTRATE 25 MG: 25 TABLET, FILM COATED ORAL at 01:02

## 2024-02-05 RX ADMIN — TACROLIMUS 0.5 MG: 0.5 CAPSULE ORAL at 05:02

## 2024-02-05 RX ADMIN — DOCUSATE SODIUM 100 MG: 100 CAPSULE, LIQUID FILLED ORAL at 09:02

## 2024-02-05 RX ADMIN — APIXABAN 5 MG: 5 TABLET, FILM COATED ORAL at 08:02

## 2024-02-05 RX ADMIN — DOCUSATE SODIUM 100 MG: 100 CAPSULE, LIQUID FILLED ORAL at 08:02

## 2024-02-05 NOTE — PROGRESS NOTES
"CT SURGERY PROGRESS NOTE  Radu Rock  64 y.o.  1960    Patients Procedure: Procedure(s) (LRB):  CORONARY ARTERY BYPASS GRAFT (CABG) (N/A)  SURGICAL PROCUREMENT, VEIN, ENDOSCOPIC (Bilateral)    Subjective  Interval History: Patient is postoperative day 5 from CABG.  No acute events overnight.  Sitting up in bed, no distress.  Wife at bedside.  He has no major complaints other than feeling "depressed" because he is ready to go home.  Initially on 5 L oxymask but turned down to 3 L - saturation 93-99%.  Chest tube removed yesterday - CXR stable this morning.     Review of Systems   Constitutional:  Negative for chills, fever and malaise/fatigue.   Respiratory:  Negative for cough, shortness of breath and wheezing.    Cardiovascular:  Positive for chest pain (incisional). Negative for palpitations and leg swelling.   Gastrointestinal:  Negative for nausea and vomiting.   Psychiatric/Behavioral:  Positive for depression.        Medication List  Infusions   loperamide       Scheduled   aspirin  81 mg Oral Daily    atorvastatin  20 mg Oral QHS    docusate sodium  100 mg Oral BID    enoxparin  40 mg Subcutaneous Daily    EScitalopram oxalate  10 mg Oral Daily    famotidine  20 mg Oral BID    folic acid  1 mg Oral Daily    furosemide (LASIX) injection  80 mg Intravenous Q12H    mupirocin   Nasal BID    sucralfate  1 g Oral QID (AC & HS)    tacrolimus  0.5 mg Oral Daily PM    tacrolimus  1 mg Oral Daily AM       Objective:  Recent Vitals:  Temp:  [97.9 °F (36.6 °C)-99.2 °F (37.3 °C)] 98.6 °F (37 °C)  Pulse:  [] 85  Resp:  [17-18] 18  SpO2:  [94 %-97 %] 94 %  BP: (107-143)/(70-82) 116/72    Physical Exam  Constitutional:       General: He is not in acute distress.     Appearance: He is obese. He is not ill-appearing.   Cardiovascular:      Rate and Rhythm: Normal rate and regular rhythm.      Pulses: Normal pulses.      Heart sounds: Normal heart sounds.   Pulmonary:      Effort: Pulmonary effort is normal. " No respiratory distress.      Breath sounds: Rhonchi present. No wheezing or rales.   Abdominal:      General: Bowel sounds are normal. There is no distension.      Palpations: Abdomen is soft.   Skin:     General: Skin is warm and dry.      Comments: Median sternotomy incision teresa - c/d/i   Neurological:      General: No focal deficit present.      Mental Status: He is alert and oriented to person, place, and time.   Psychiatric:         Mood and Affect: Mood normal.         Behavior: Behavior normal.          I/O last 24 hrs:  Intake/Output - Last 3 Shifts         02/03 0700  02/04 0659 02/04 0700  02/05 0659 02/05 0700  02/06 0659    P.O. 1200 717     Total Intake(mL/kg) 1200 (11.6) 717 (7.2)     Urine (mL/kg/hr) 4350 (1.8) 2900 (1.2)     Stool 0      Chest Tube 20      Total Output 4370 2900     Net -3170 -2183            Stool Occurrence 0 x              Labs  CBC:   Recent Labs   Lab 02/05/24  0456   WBC 9.60   RBC 3.25*   HGB 9.7*   HCT 30.1*      MCV 92.6   MCH 29.8   MCHC 32.2*     CMP:   Recent Labs   Lab 02/05/24  0456   CALCIUM 8.1*   ALBUMIN 3.2*      K 4.1   CO2 30   BUN 23.6   CREATININE 1.57*   ALKPHOS 129   ALT 47   AST 47*   BILITOT 1.2     LFTs:   Recent Labs   Lab 02/05/24  0456   ALT 47   AST 47*   ALKPHOS 129   BILITOT 1.2   ALBUMIN 3.2*     All pertinent labs from the last 24 hours have been reviewed.      Imaging:   CXR: X-Ray Chest PA And Lateral    Result Date: 2/5/2024  No acute cardiopulmonary process.  Interval removal of mediastinal drains. Electronically signed by: Nj Daly Date:    02/05/2024 Time:    07:11   I have reviewed all pertinent imaging results/findings within the past 24 hours.        ASSESSMENT/PLAN:    Coronary artery disease  -s/p CABG  Acute on chronic diastolic heart failure   HTN  BRIAN  Cirrhosis / Liver cancer / HCV s/p Liver transplant    -CXR stable  -Renal indices slightly more elevated this morning - Cr 1.57 / BUN 23.6  -Liver enzymes also  slightly elevated this morning - AST / ALT 47.  Will defer management of statin to cardiology, appreciate their assistance.  Continue to closely monitor LFTs.  -Continue to wean oxygen as able.  Frequent IS use, deep breathing, and mobilization encouraged.  -Discharge planning per case management.     Case and plan of care discussed with Dr. Ghassan Ash, [facility]

## 2024-02-05 NOTE — PROGRESS NOTES
02/05/24 1615   Pre Exercise Vitals   BP 96/68   Pulse 83   Supplemental O2? Yes   O2 Device oxymask   O2 Flow (L/min) 2   SpO2 94 %   During Exercise Vitals   Pulse 90   Supplemental O2? Yes   O2 Device oxymask   O2 Flow (L/min) 2   SpO2 91 %   Distance Walked 200 feet   Post Exercise Vitals   /72   Pulse 80   Supplemental O2? Yes   O2 Device nasal cannula w/ humidification   O2 Flow (L/min) 2   SpO2 98 %   Modality   Modality   (rollator)     Standby assist. Sternal precautions maintained. Gait steady and strong. Tolerated well. EKG NSR. Communicated with nurse pre and post walk.

## 2024-02-05 NOTE — PLAN OF CARE
[facility] noted as office ordered. [facility].     Informed pt that  referral sent, pt would like to use [facility]  [phone].  Referral sent to [facility] . I spoke to Tonya and they do follow Cardiac protocols.      [facility] states that they will accept the pt .

## 2024-02-05 NOTE — PROGRESS NOTES
"  [facility]    [facility]  [facility] Note    Patient Name: Radu Rock  MRN: [id number]  Admission Date: 1/31/2024  Hospital Length of Stay: 5 days  Code Status: Full Code   Attending Provider: Juventino Ferrell IV, MD   Consulting Provider: [facility] [place]  Primary Care Physician: Mariella Huntley MD  Principal Problem:<principal problem not specified>    Patient information was obtained from patient, past medical records, and ER records.     Subjective:     Chief Complaint: Reason for Consult: Post CABG     HPI: Mr. Rock is a 65 y/o male who is known to [facility], Dr. Carlos. The patient presented to [facility] on 1.31.24 for a Planned CABG with CV Surgery/Dr. Ferrell. He recently underwent a workup with Nuclear PET which was abnormal, prompting a C with noted MVCAD. He was referred to CV Surgery for CABG Evaluation and she was deemed a candidate for CABG. On 1.31.24 he underwent a CABG with results of LIMA to LAD, rSVG to OM, rSVG to PDA and Bilateral EVH. He tolerated the procedure and was admitted for observation in the ICU and further management. [facility] has been consulted for Post Operative Medical Management.     2.1.24: NAD. "I am good." Sitting in Bedside Chair. Denies SOB and Palps. + CP/Incisional.   2.2.24: NAD. "I am a tad SOB." + SOB, + CP/Incisional. Denies Palps. SBP 90s.HR 100s.  2.3.24: NAD. "I am good." + CP/Incisional. Denies Palps and SOB. + SMITH Only. Remains in SR. Fluid Balance Net Negative 2340mL. Labs Ordered/Not Drawn.   2.4.24: NAD. "I am SOB." + CP/Incisional. + SOB. Remains in SR. Fluid Balance Net Negative 3170mL.  2.5.24: NAD. "I feel so much better." + Incisional CP. Denies SOB. Went into afib RVR. Voided 2900 mL/24 hours.     PMH: HLD, HTN, MVCAD/CABG, Liver Transplant, Liver Transplant/Cirrhosis, Liver Cancer, HCV/Hepatitis C Virus, MI, Obesity, Osteoporosis, TAY/CPAP  PSH: Liver Transplant, CABG, Angiogram, Cholecystectomy, Appendectomy, LE Surgery, Shoulder " Surgery, EGD  Family History: Denies Family History of Heart Disease  Social History: Denies Illicit Drug, ETOH and Tobacco Use    Previous Cardiac Diagnostics:   CABG 1.31.24:  LIMA to LAD, rSVG to OM, rSVG to PDA, Bilateral EVH.    [facility] 1.22.24:  Procedures performed:  1. Ultrasound-guided right radial access  2. Coronary angiography  3. Left ventricular hemodynamics  4. IFR interrogation  -LAD 0.82  -circumflex 0.88  -PDA 0.91  5. LV EDP-19  6. Lima-angio  Findings:  1. Left main-normal  2. Lad-60-70% lad mid ISR involving large diagonal, IFR positive, 0.82 in LAD  3. LCX-non dominant, 80% proximal lesion extending into OM2, OM1 70% ostial lesion  4. RCA-dominant, 60% proximal lesion, 80% ostial PDA  5. LIMA patent  Plan:  1. Maximize medical management  2. Consult CTS for bypass evaluation.  If patient is not a candidate will need Laser atherectomy of LAD stent and PCI of Mid LAD, Circumlex, and possibly PDA.  Would re-evalaute PDA at time of PCI, but I think it is bad enough for intervention.  Inferoapical walls are involved on stress test.    PET 1.19.24:  This is an abnormal perfusion study.   This scan is suggestive of moderate to high risk for future cardiovascular events.   Small partially reversible perfusion abnormality of severe intensity in the apical segment. Small reversible perfusion abnormality of moderate intensity in the apical anterior segment. Small partially reversible perfusion abnormality of severe intensity in the apical septal segment. Small partially reversible perfusion abnormality of severe intensity in the apical inferior segment.   The left ventricular cavity is noted to be normal on the stress studies. The stress left ventricular ejection fraction was calculated to be 64% and left ventricular global function is normal. The rest left ventricular cavity is noted to be normal. The rest left ventricular ejection fraction was calculated to be 64% and rest left ventricular global function  is normal.   Compared with rest and stress studies the ejection fraction remains unchanged (64).   The study quality is average.   There was a rise in myocardial blood flow between rest and stress.  Global myocardial blood flow reserve was 3.01.  Normal global coronary flow reserve is suggestive of low coronary event risk.    ECHO 1.10.24:  The left ventricle is normal in size with moderate to severe, asymmetric septal left ventricular hypertrophy and mild, asymmetric posterior left ventricular hypertrophy and normal left ventricular systolic function. The left ventricular ejection fraction is 65%. Left ventricular diastolic function is normal. Bradycardia is noted.  Mild dilatation of the aortic root is noted at 3.9 cms. The aortic arch is normal at 3.1 cms.   Unable to adequately visualize and assess the pulmonary artery.  Poor short axis echo windows.  The study quality is average.     Review of patient's allergies indicates:  No Known Allergies    No current facility-administered medications on file prior to encounter.     Current Outpatient Medications on File Prior to Encounter   Medication Sig    alendronate (FOSAMAX) 35 MG tablet Take 35 mg by mouth every 7 days. Mondays    amLODIPine (NORVASC) 5 MG tablet Take 5 mg by mouth nightly.    aspirin (ECOTRIN) 81 MG EC tablet Take 81 mg by mouth once.    atorvastatin (LIPITOR) 20 MG tablet Take 20 mg by mouth every evening.    EScitalopram oxalate (LEXAPRO) 10 MG tablet Take 10 mg by mouth every morning.    magnesium oxide (MAG-OX) 400 mg (241.3 mg magnesium) tablet Take 400 mg by mouth 2 (two) times daily.    metoprolol tartrate (LOPRESSOR) 25 MG tablet Take 25 mg by mouth 2 (two) times daily.    olmesartan (BENICAR) 20 MG tablet Take 20 mg by mouth once daily.    rifAXIMin (XIFAXAN) 550 mg Tab Take 550 mg by mouth 2 (two) times daily.    tacrolimus (PROGRAF) 1 MG Cap Take 1 mg by mouth. 1 mg in the am and 0.5mg in the pm    cholecalciferol, vitamin D3, 1,250 mcg  (50,000 unit) capsule Take 50,000 Units by mouth every 7 days. Monday    clopidogreL (PLAVIX) 75 mg tablet Take 75 mg by mouth once daily.     Review of Systems   Constitutional:  Positive for fatigue.   Respiratory:  Negative for shortness of breath.    Cardiovascular:  Positive for chest pain (Incisional). Negative for palpitations and leg swelling.   All other systems reviewed and are negative.    Objective:     Vital Signs (Most Recent):  Temp: 98.4 °F (36.9 °C) (02/05/24 1120)  Pulse: 95 (02/05/24 1251)  Resp: 18 (02/05/24 0945)  BP: 112/71 (02/05/24 1120)  SpO2: 97 % (02/05/24 1200) Vital Signs (24h Range):  Temp:  [97.9 °F (36.6 °C)-99.2 °F (37.3 °C)] 98.4 °F (36.9 °C)  Pulse:  [] 95  Resp:  [18] 18  SpO2:  [94 %-97 %] 97 %  BP: (108-143)/(70-82) 112/71     Weight: 100.1 kg (220 lb 10.9 oz)  Body mass index is 31.66 kg/m².    SpO2: 97 %         Intake/Output Summary (Last 24 hours) at 2/5/2024 1321  Last data filed at 2/4/2024 2300  Gross per 24 hour   Intake 480 ml   Output 2500 ml   Net -2020 ml       Lines/Drains/Airways       Drain  Duration                  Chest Tube 01/31/24 0933 Mediastinal 24 Fr. 5 days              Peripheral Intravenous Line  Duration                  Peripheral IV - Single Lumen 01/31/24 0540 18 G Anterior;Distal;Left Forearm 5 days                  Significant Labs:  Recent Results (from the past 72 hour(s))   Comprehensive Metabolic Panel    Collection Time: 02/03/24 12:37 PM   Result Value Ref Range    Sodium Level 133 (L) 136 - 145 mmol/L    Potassium Level 4.5 3.5 - 5.1 mmol/L    Chloride 95 (L) 98 - 107 mmol/L    Carbon Dioxide 25 23 - 31 mmol/L    Glucose Level 162 (H) 82 - 115 mg/dL    Blood Urea Nitrogen 21.0 8.4 - 25.7 mg/dL    Creatinine 1.53 (H) 0.73 - 1.18 mg/dL    Calcium Level Total 8.1 (L) 8.8 - 10.0 mg/dL    Protein Total 6.6 5.8 - 7.6 gm/dL    Albumin Level 3.1 (L) 3.4 - 4.8 g/dL    Globulin 3.5 2.4 - 3.5 gm/dL    Albumin/Globulin Ratio 0.9 (L) 1.1 - 2.0  ratio    Bilirubin Total 1.1 <=1.5 mg/dL    Alkaline Phosphatase 70 40 - 150 unit/L    Alanine Aminotransferase 20 0 - 55 unit/L    Aspartate Aminotransferase 30 5 - 34 unit/L    eGFR 50 mls/min/1.73/m2   Magnesium    Collection Time: 02/03/24 12:37 PM   Result Value Ref Range    Magnesium Level 2.30 1.60 - 2.60 mg/dL   CBC with Differential    Collection Time: 02/03/24 12:37 PM   Result Value Ref Range    WBC 11.00 4.50 - 11.50 x10(3)/mcL    RBC 2.82 (L) 4.70 - 6.10 x10(6)/mcL    Hgb 8.5 (L) 14.0 - 18.0 g/dL    Hct 26.5 (L) 42.0 - 52.0 %    MCV 94.0 80.0 - 94.0 fL    MCH 30.1 27.0 - 31.0 pg    MCHC 32.1 (L) 33.0 - 36.0 g/dL    RDW 14.6 11.5 - 17.0 %    Platelet 96 (L) 130 - 400 x10(3)/mcL    MPV 13.2 (H) 7.4 - 10.4 fL    Neut % 79.2 %    Lymph % 10.9 %    Mono % 8.3 %    Eos % 0.8 %    Basophil % 0.3 %    Lymph # 1.20 0.6 - 4.6 x10(3)/mcL    Neut # 8.71 2.1 - 9.2 x10(3)/mcL    Mono # 0.91 0.1 - 1.3 x10(3)/mcL    Eos # 0.09 0 - 0.9 x10(3)/mcL    Baso # 0.03 <=0.2 x10(3)/mcL    IG# 0.06 (H) 0 - 0.04 x10(3)/mcL    IG% 0.5 %    NRBC% 0.0 %    IPF 18.1 (H) 0.9 - 11.2 %   Comprehensive Metabolic Panel    Collection Time: 02/04/24  7:12 AM   Result Value Ref Range    Sodium Level 135 (L) 136 - 145 mmol/L    Potassium Level 4.4 3.5 - 5.1 mmol/L    Chloride 98 98 - 107 mmol/L    Carbon Dioxide 28 23 - 31 mmol/L    Glucose Level 121 (H) 82 - 115 mg/dL    Blood Urea Nitrogen 22.0 8.4 - 25.7 mg/dL    Creatinine 1.41 (H) 0.73 - 1.18 mg/dL    Calcium Level Total 7.7 (L) 8.8 - 10.0 mg/dL    Protein Total 5.7 (L) 5.8 - 7.6 gm/dL    Albumin Level 3.0 (L) 3.4 - 4.8 g/dL    Globulin 2.7 2.4 - 3.5 gm/dL    Albumin/Globulin Ratio 1.1 1.1 - 2.0 ratio    Bilirubin Total 1.0 <=1.5 mg/dL    Alkaline Phosphatase 89 40 - 150 unit/L    Alanine Aminotransferase 21 0 - 55 unit/L    Aspartate Aminotransferase 34 5 - 34 unit/L    eGFR 55 mls/min/1.73/m2   Magnesium    Collection Time: 02/04/24  7:12 AM   Result Value Ref Range    Magnesium Level  2.50 1.60 - 2.60 mg/dL   CBC with Differential    Collection Time: 02/04/24  7:12 AM   Result Value Ref Range    WBC 8.47 4.50 - 11.50 x10(3)/mcL    RBC 2.78 (L) 4.70 - 6.10 x10(6)/mcL    Hgb 8.5 (L) 14.0 - 18.0 g/dL    Hct 25.2 (L) 42.0 - 52.0 %    MCV 90.6 80.0 - 94.0 fL    MCH 30.6 27.0 - 31.0 pg    MCHC 33.7 33.0 - 36.0 g/dL    RDW 14.8 11.5 - 17.0 %    Platelet 124 (L) 130 - 400 x10(3)/mcL    MPV 12.6 (H) 7.4 - 10.4 fL    Neut % 72.4 %    Lymph % 13.7 %    Mono % 11.1 %    Eos % 2.0 %    Basophil % 0.2 %    Lymph # 1.16 0.6 - 4.6 x10(3)/mcL    Neut # 6.13 2.1 - 9.2 x10(3)/mcL    Mono # 0.94 0.1 - 1.3 x10(3)/mcL    Eos # 0.17 0 - 0.9 x10(3)/mcL    Baso # 0.02 <=0.2 x10(3)/mcL    IG# 0.05 (H) 0 - 0.04 x10(3)/mcL    IG% 0.6 %    NRBC% 0.0 %   Comprehensive Metabolic Panel    Collection Time: 02/05/24  4:56 AM   Result Value Ref Range    Sodium Level 139 136 - 145 mmol/L    Potassium Level 4.1 3.5 - 5.1 mmol/L    Chloride 97 (L) 98 - 107 mmol/L    Carbon Dioxide 30 23 - 31 mmol/L    Glucose Level 125 (H) 82 - 115 mg/dL    Blood Urea Nitrogen 23.6 8.4 - 25.7 mg/dL    Creatinine 1.57 (H) 0.73 - 1.18 mg/dL    Calcium Level Total 8.1 (L) 8.8 - 10.0 mg/dL    Protein Total 6.2 5.8 - 7.6 gm/dL    Albumin Level 3.2 (L) 3.4 - 4.8 g/dL    Globulin 3.0 2.4 - 3.5 gm/dL    Albumin/Globulin Ratio 1.1 1.1 - 2.0 ratio    Bilirubin Total 1.2 <=1.5 mg/dL    Alkaline Phosphatase 129 40 - 150 unit/L    Alanine Aminotransferase 47 0 - 55 unit/L    Aspartate Aminotransferase 47 (H) 5 - 34 unit/L    eGFR 48 mls/min/1.73/m2   Magnesium    Collection Time: 02/05/24  4:56 AM   Result Value Ref Range    Magnesium Level 2.50 1.60 - 2.60 mg/dL   CBC with Differential    Collection Time: 02/05/24  4:56 AM   Result Value Ref Range    WBC 9.60 4.50 - 11.50 x10(3)/mcL    RBC 3.25 (L) 4.70 - 6.10 x10(6)/mcL    Hgb 9.7 (L) 14.0 - 18.0 g/dL    Hct 30.1 (L) 42.0 - 52.0 %    MCV 92.6 80.0 - 94.0 fL    MCH 29.8 27.0 - 31.0 pg    MCHC 32.2 (L) 33.0 -  36.0 g/dL    RDW 14.6 11.5 - 17.0 %    Platelet 173 130 - 400 x10(3)/mcL    MPV 11.6 (H) 7.4 - 10.4 fL    Neut % 66.4 %    Lymph % 17.6 %    Mono % 11.0 %    Eos % 3.9 %    Basophil % 0.5 %    Lymph # 1.69 0.6 - 4.6 x10(3)/mcL    Neut # 6.37 2.1 - 9.2 x10(3)/mcL    Mono # 1.06 0.1 - 1.3 x10(3)/mcL    Eos # 0.37 0 - 0.9 x10(3)/mcL    Baso # 0.05 <=0.2 x10(3)/mcL    IG# 0.06 (H) 0 - 0.04 x10(3)/mcL    IG% 0.6 %    NRBC% 0.2 %     Telemetry: SR    Physical Exam  Constitutional:       General: He is not in acute distress.     Appearance: Normal appearance.   HENT:      Head: Normocephalic.      Mouth/Throat:      Mouth: Mucous membranes are moist.   Eyes:      Conjunctiva/sclera: Conjunctivae normal.   Cardiovascular:      Rate and Rhythm: Tachycardia present. Rhythm irregular.      Pulses: Normal pulses.      Heart sounds: Normal heart sounds. No murmur heard.  Pulmonary:      Effort: Pulmonary effort is normal.      Breath sounds: Normal breath sounds.      Comments: RA  Abdominal:      Palpations: Abdomen is soft.   Musculoskeletal:      Right lower leg: No edema.      Left lower leg: No edema.   Skin:     General: Skin is warm and dry.      Comments: Midline Sternotomy Dressing C/D/I   Neurological:      General: No focal deficit present.      Mental Status: He is alert and oriented to person, place, and time. Mental status is at baseline.   Psychiatric:         Mood and Affect: Mood normal.         Behavior: Behavior normal.       Home Medications:   No current facility-administered medications on file prior to encounter.     Current Outpatient Medications on File Prior to Encounter   Medication Sig Dispense Refill    alendronate (FOSAMAX) 35 MG tablet Take 35 mg by mouth every 7 days. Mondays      amLODIPine (NORVASC) 5 MG tablet Take 5 mg by mouth nightly.      aspirin (ECOTRIN) 81 MG EC tablet Take 81 mg by mouth once.      atorvastatin (LIPITOR) 20 MG tablet Take 20 mg by mouth every evening.      EScitalopram  oxalate (LEXAPRO) 10 MG tablet Take 10 mg by mouth every morning.      magnesium oxide (MAG-OX) 400 mg (241.3 mg magnesium) tablet Take 400 mg by mouth 2 (two) times daily.      metoprolol tartrate (LOPRESSOR) 25 MG tablet Take 25 mg by mouth 2 (two) times daily.      olmesartan (BENICAR) 20 MG tablet Take 20 mg by mouth once daily.      rifAXIMin (XIFAXAN) 550 mg Tab Take 550 mg by mouth 2 (two) times daily.      tacrolimus (PROGRAF) 1 MG Cap Take 1 mg by mouth. 1 mg in the am and 0.5mg in the pm      cholecalciferol, vitamin D3, 1,250 mcg (50,000 unit) capsule Take 50,000 Units by mouth every 7 days. Monday      clopidogreL (PLAVIX) 75 mg tablet Take 75 mg by mouth once daily.       Current Inpatient Medications:    Current Facility-Administered Medications:     acetaminophen oral solution 650 mg, 650 mg, Per OG tube, Q6H PRN, Theron Morris PA-C    albumin human 5% bottle 12.5 g, 12.5 g, Intravenous, PRN, Theron Morris PA-C, Stopped at 01/31/24 1302    apixaban tablet 5 mg, 5 mg, Oral, BID, Rosenda Kolb FNP    aspirin EC tablet 81 mg, 81 mg, Oral, Daily, Theron Morris PA-C, 81 mg at 02/05/24 0932    calcium gluconate 1 g in NS IVPB (premixed), 1 g, Intravenous, PRN, Theron Morris PA-C    calcium gluconate 1 g in NS IVPB (premixed), 2 g, Intravenous, PRN, Theron Morris PA-C    calcium gluconate 1 g in NS IVPB (premixed), 3 g, Intravenous, PRN, Theron Morris PA-C    dextrose 10% bolus 125 mL 125 mL, 12.5 g, Intravenous, PRNArturo John C, PA-C    dextrose 10% bolus 250 mL 250 mL, 25 g, Intravenous, PRN, Theron Morris PA-C    digoxin injection 500 mcg, 500 mcg, Intravenous, Once, Rosenda Kolb FNP    diltiaZEM 125 mg in dextrose 5 % 125 mL IVPB (ready to mix) (non-titrating), 5 mg/hr, Intravenous, Continuous, Rosenda Kolb, BECKAP    docusate sodium capsule 100 mg, 100 mg, Oral, BID, Theron Morris PA-C, 100 mg at 02/05/24 0932    EScitalopram oxalate tablet 10 mg, 10  mg, Oral, Daily, Juventino Ferrell IV, MD, 10 mg at 02/05/24 0932    famotidine tablet 20 mg, 20 mg, Oral, BID, Kristie Cook MD, 20 mg at 02/05/24 0932    folic acid tablet 1 mg, 1 mg, Oral, Daily, Theron Morris PA-C, 1 mg at 02/05/24 0932    [START ON 2/6/2024] furosemide tablet 40 mg, 40 mg, Oral, Daily, Rosenda Kolb FNP    HYDROcodone-acetaminophen 5-325 mg per tablet 1 tablet, 1 tablet, Oral, Q4H PRN, Theron Morris PA-C, 1 tablet at 02/05/24 0945    lactulose 10 gram/15 ml solution 20 g, 20 g, Oral, Q6H PRN, Theron Morris PA-C    loperamide capsule 2 mg, 2 mg, Oral, Continuous PRN, Theron Morris PA-C    magnesium sulfate 2g in water 50mL IVPB (premix), 2 g, Intravenous, PRN, Theron Morris PA-C    magnesium sulfate 2g in water 50mL IVPB (premix), 4 g, Intravenous, PRN, Theron Morris PA-C    metoclopramide injection 5 mg, 5 mg, Intravenous, Q6H PRN, Theron Morris PA-C    morphine injection 4 mg, 4 mg, Intravenous, Q4H PRN, Theron Morris, PA-C, 4 mg at 01/31/24 1051    ondansetron injection 4 mg, 4 mg, Intravenous, Q4H PRN, Theron oMrris PA-C, 4 mg at 01/31/24 2044    oxyCODONE immediate release tablet 5 mg, 5 mg, Oral, Q4H PRN, Juventino Ferrell IV, MD, 5 mg at 02/04/24 1546    potassium chloride 20 mEq in 100 mL IVPB (FOR CENTRAL LINE ADMINISTRATION ONLY), 20 mEq, Intravenous, PRN, Theron Morris PA-C    potassium chloride 20 mEq in 100 mL IVPB (FOR CENTRAL LINE ADMINISTRATION ONLY), 40 mEq, Intravenous, PRN, Theron Morris PA-C    potassium chloride 20 mEq in 100 mL IVPB (FOR CENTRAL LINE ADMINISTRATION ONLY), 60 mEq, Intravenous, PRN, Theron Morirs PA-C    sodium phosphate 15 mmol in dextrose 5 % (D5W) 250 mL IVPB, 15 mmol, Intravenous, PRN, Theron Morris PA-C    sodium phosphate 20.01 mmol in dextrose 5 % (D5W) 250 mL IVPB, 20.01 mmol, Intravenous, PRN, Theron Morris PA-C    sodium phosphate 30 mmol in dextrose 5 % (D5W) 250 mL IVPB, 30 mmol,  Intravenous, PRN, Theron Morris PA-C    sucralfate tablet 1 g, 1 g, Oral, QID (AC & HS), Theron Morris PA-C, 1 g at 02/05/24 0937    tacrolimus capsule 0.5 mg, 0.5 mg, Oral, Daily PM, Sky Samn DOMINGO, BECKAP, 0.5 mg at 02/04/24 1725    tacrolimus capsule 1 mg, 1 mg, Oral, Daily AM, Josh Solano MD, 1 mg at 02/05/24 0932    VTE Risk Mitigation (From admission, onward)           Ordered     apixaban tablet 5 mg  2 times daily         02/05/24 1321     Place sequential compression device  Until discontinued         01/31/24 1008     IP VTE HIGH RISK PATIENT  Once         01/31/24 0840                  Assessment:   MVCAD    - CABG (1.31.24) - LIMA to LAD, rSVG to OM, rSVG to PDA, Bilateral EVH    - LHC (1.22.24) - 1. Left main-normal 2. Lad-60-70% lad mid ISR involving large diagonal, IFR positive, 0.82 in LAD 3. LCX-non dominant, 80% proximal lesion extending into OM2, OM1 70% ostial lesion 4. RCA-dominant, 60% proximal lesion, 80% ostial PDA 5. LIMA patent    - ECHO 1.10.24 - LVEF 65%  Newly Diagnosed Afib RVR (Post-op)    - CHADSVASC Score 2 Points  Acute on Chronic Diastolic HF/EF 65% - Clinically Improving    - CXR (2.2.24) - Pulmonary Vascular Congestion   Febrile - Resolved   BRIAN - Worsening   HTN - Borderline BP   HLD  Cirrhosis/Liver Cancer/HCV s/p Liver Transplant  Anemia   Hx of MI  Obesity  Osteoporosis  TAY/CPAP  Leukocytosis - Resolved  Thrombocytopenia - Resolved  No Hx of GIB     Plan:   Continue ASA   Hold Statin  Hx of Liver Transplant/Cautious Use of Statin - LFT's slightly increased today. Will hold statin.  He went into afib RVR. Resume metoprolol tartrate 25 mg BID. Unable to start amio gtt s/t liver CA/liver transplant. He spontaneously went back into NSR.   Discontinue IV lasix and start Lasix 40 mg PO daily.   Accurate I&Os and Daily Weights   Encourage Aggressive Mobilization of PT and Q1HR IS  Labs in AM: CBC, CMP and Mg    Rosenda Kolb, BECKAP  Cardiology  [facility]  General  02/05/2024     I agree with the findings of the complexity of problems addressed and take responsibility for the plan's risks and complications. I approved the plan documented by Rosenda Kolb NP.  Renal function and liver function tests are slightly worse today  The patient went briefly into AFib with rapid ventricular response but he converted back spontaneously to normal sinus rhythm  Switch IV Lasix to oral 40 mg daily  Restart metoprolol tartrate 25 mg twice a day  Hold statin due to worsening liver function tests

## 2024-02-06 VITALS
OXYGEN SATURATION: 93 % | TEMPERATURE: 98 F | SYSTOLIC BLOOD PRESSURE: 100 MMHG | BODY MASS INDEX: 31.37 KG/M2 | RESPIRATION RATE: 16 BRPM | WEIGHT: 219.13 LBS | DIASTOLIC BLOOD PRESSURE: 65 MMHG | HEART RATE: 70 BPM | HEIGHT: 70 IN

## 2024-02-06 PROBLEM — I25.10 CAD (CORONARY ARTERY DISEASE): Status: ACTIVE | Noted: 2024-02-06

## 2024-02-06 LAB
ALBUMIN SERPL-MCNC: 3 G/DL (ref 3.4–4.8)
ALBUMIN/GLOB SERPL: 1.1 RATIO (ref 1.1–2)
ALP SERPL-CCNC: 140 UNIT/L (ref 40–150)
ALT SERPL-CCNC: 69 UNIT/L (ref 0–55)
AST SERPL-CCNC: 53 UNIT/L (ref 5–34)
BASOPHILS # BLD AUTO: 0.04 X10(3)/MCL
BASOPHILS NFR BLD AUTO: 0.5 %
BILIRUB SERPL-MCNC: 0.9 MG/DL
BUN SERPL-MCNC: 20.5 MG/DL (ref 8.4–25.7)
CALCIUM SERPL-MCNC: 8.3 MG/DL (ref 8.8–10)
CHLORIDE SERPL-SCNC: 98 MMOL/L (ref 98–107)
CO2 SERPL-SCNC: 30 MMOL/L (ref 23–31)
CREAT SERPL-MCNC: 1.41 MG/DL (ref 0.73–1.18)
EOSINOPHIL # BLD AUTO: 0.28 X10(3)/MCL (ref 0–0.9)
EOSINOPHIL NFR BLD AUTO: 3.5 %
ERYTHROCYTE [DISTWIDTH] IN BLOOD BY AUTOMATED COUNT: 14.8 % (ref 11.5–17)
GFR SERPLBLD CREATININE-BSD FMLA CKD-EPI: 55 MLS/MIN/1.73/M2
GLOBULIN SER-MCNC: 2.8 GM/DL (ref 2.4–3.5)
GLUCOSE SERPL-MCNC: 113 MG/DL (ref 82–115)
HCT VFR BLD AUTO: 28.5 % (ref 42–52)
HGB BLD-MCNC: 9.2 G/DL (ref 14–18)
IMM GRANULOCYTES # BLD AUTO: 0.04 X10(3)/MCL (ref 0–0.04)
IMM GRANULOCYTES NFR BLD AUTO: 0.5 %
LYMPHOCYTES # BLD AUTO: 1.38 X10(3)/MCL (ref 0.6–4.6)
LYMPHOCYTES NFR BLD AUTO: 17.4 %
MAGNESIUM SERPL-MCNC: 2.5 MG/DL (ref 1.6–2.6)
MCH RBC QN AUTO: 29.8 PG (ref 27–31)
MCHC RBC AUTO-ENTMCNC: 32.3 G/DL (ref 33–36)
MCV RBC AUTO: 92.2 FL (ref 80–94)
MONOCYTES # BLD AUTO: 0.88 X10(3)/MCL (ref 0.1–1.3)
MONOCYTES NFR BLD AUTO: 11.1 %
NEUTROPHILS # BLD AUTO: 5.32 X10(3)/MCL (ref 2.1–9.2)
NEUTROPHILS NFR BLD AUTO: 67 %
NRBC BLD AUTO-RTO: 0 %
PLATELET # BLD AUTO: 198 X10(3)/MCL (ref 130–400)
PMV BLD AUTO: 11.6 FL (ref 7.4–10.4)
POTASSIUM SERPL-SCNC: 3.6 MMOL/L (ref 3.5–5.1)
PROT SERPL-MCNC: 5.8 GM/DL (ref 5.8–7.6)
RBC # BLD AUTO: 3.09 X10(6)/MCL (ref 4.7–6.1)
SODIUM SERPL-SCNC: 137 MMOL/L (ref 136–145)
WBC # SPEC AUTO: 7.94 X10(3)/MCL (ref 4.5–11.5)

## 2024-02-06 PROCEDURE — 99024 POSTOP FOLLOW-UP VISIT: CPT | Mod: POP,,,

## 2024-02-06 PROCEDURE — 85025 COMPLETE CBC W/AUTO DIFF WBC: CPT

## 2024-02-06 PROCEDURE — 25000003 PHARM REV CODE 250

## 2024-02-06 PROCEDURE — 25000003 PHARM REV CODE 250: Performed by: SPECIALIST

## 2024-02-06 PROCEDURE — 83735 ASSAY OF MAGNESIUM: CPT

## 2024-02-06 PROCEDURE — 80053 COMPREHEN METABOLIC PANEL: CPT

## 2024-02-06 PROCEDURE — 25000003 PHARM REV CODE 250: Performed by: PHYSICIAN ASSISTANT

## 2024-02-06 PROCEDURE — 94760 N-INVAS EAR/PLS OXIMETRY 1: CPT

## 2024-02-06 PROCEDURE — 63600175 PHARM REV CODE 636 W HCPCS: Performed by: INTERNAL MEDICINE

## 2024-02-06 PROCEDURE — 97110 THERAPEUTIC EXERCISES: CPT

## 2024-02-06 RX ORDER — METOPROLOL TARTRATE 50 MG/1
50 TABLET ORAL 2 TIMES DAILY
Status: DISCONTINUED | OUTPATIENT
Start: 2024-02-06 | End: 2024-02-06 | Stop reason: HOSPADM

## 2024-02-06 RX ORDER — METOPROLOL TARTRATE 25 MG/1
25 TABLET, FILM COATED ORAL ONCE
Status: COMPLETED | OUTPATIENT
Start: 2024-02-06 | End: 2024-02-06

## 2024-02-06 RX ORDER — TACROLIMUS 0.5 MG/1
0.5 CAPSULE ORAL NIGHTLY
Qty: 30 CAPSULE | Refills: 0 | Status: SHIPPED | OUTPATIENT
Start: 2024-02-06 | End: 2025-02-05

## 2024-02-06 RX ORDER — HYDROCODONE BITARTRATE AND ACETAMINOPHEN 5; 325 MG/1; MG/1
1 TABLET ORAL EVERY 6 HOURS PRN
Qty: 30 TABLET | Refills: 0 | Status: SHIPPED | OUTPATIENT
Start: 2024-02-06

## 2024-02-06 RX ORDER — METOPROLOL TARTRATE 50 MG/1
50 TABLET ORAL 2 TIMES DAILY
Qty: 60 TABLET | Refills: 11 | Status: SHIPPED | OUTPATIENT
Start: 2024-02-06 | End: 2025-02-05

## 2024-02-06 RX ORDER — TACROLIMUS 1 MG/1
1 CAPSULE ORAL EVERY MORNING
Qty: 30 CAPSULE | Refills: 11 | Status: SHIPPED | OUTPATIENT
Start: 2024-02-07 | End: 2025-02-06

## 2024-02-06 RX ADMIN — APIXABAN 5 MG: 5 TABLET, FILM COATED ORAL at 09:02

## 2024-02-06 RX ADMIN — FOLIC ACID 1 MG: 1 TABLET ORAL at 09:02

## 2024-02-06 RX ADMIN — METOPROLOL TARTRATE 25 MG: 25 TABLET, FILM COATED ORAL at 09:02

## 2024-02-06 RX ADMIN — METOPROLOL TARTRATE 25 MG: 25 TABLET, FILM COATED ORAL at 10:02

## 2024-02-06 RX ADMIN — SUCRALFATE 1 G: 1 TABLET ORAL at 09:02

## 2024-02-06 RX ADMIN — HYDROCODONE BITARTRATE AND ACETAMINOPHEN 1 TABLET: 5; 325 TABLET ORAL at 10:02

## 2024-02-06 RX ADMIN — FUROSEMIDE 40 MG: 40 TABLET ORAL at 09:02

## 2024-02-06 RX ADMIN — SUCRALFATE 1 G: 1 TABLET ORAL at 05:02

## 2024-02-06 RX ADMIN — TACROLIMUS 1 MG: 1 CAPSULE ORAL at 09:02

## 2024-02-06 RX ADMIN — ESCITALOPRAM OXALATE 10 MG: 10 TABLET ORAL at 09:02

## 2024-02-06 RX ADMIN — FAMOTIDINE 20 MG: 20 TABLET, FILM COATED ORAL at 09:02

## 2024-02-06 RX ADMIN — DOCUSATE SODIUM 100 MG: 100 CAPSULE, LIQUID FILLED ORAL at 09:02

## 2024-02-06 RX ADMIN — HYDROCODONE BITARTRATE AND ACETAMINOPHEN 1 TABLET: 5; 325 TABLET ORAL at 05:02

## 2024-02-06 RX ADMIN — ASPIRIN 81 MG: 81 TABLET, COATED ORAL at 09:02

## 2024-02-06 NOTE — PROGRESS NOTES
02/06/24 1045   Pre Exercise Vitals   /72   Pulse 68  (NSR)   Supplemental O2? No   SpO2 96 %   During Exercise Vitals   Pulse 87  (NSR)   Supplemental O2? No   SpO2 93 %   Distance Walked 300 ft   Post Exercise Vitals   /75   Pulse 91  (NSR)   Supplemental O2? No   SpO2 94 %   Modality   Modality   (rollator)     Communicated with nurse prior and post activity. Standby assist sit to stand, maintains sternal precautions. Complaints of shoulder discomfort. Encouraged increased activity and use of Incentive spirometry - performs to 1500 ml. Post op discharge instructions reinforced with patient / wife - questions answered. Handout given on how to get out of bed.

## 2024-02-06 NOTE — DISCHARGE SUMMARY
[facility] - [facility] Tele[facility]  Cardiothoracic Surgery  Discharge Summary      Patient Name: Radu Rock  MRN: [id number]  Admission Date: 1/31/2024  Hospital Length of Stay: 6 days  Discharge Date and Time:  02/06/2024 12:07 PM  Attending Physician: Juventino Owen IV, MD   Discharging Provider: FER Joy  Primary Care Provider: Mariella Huntley MD    HPI:   No notes on file    Procedure(s) (LRB):  CORONARY ARTERY BYPASS GRAFT (CABG) (N/A)  SURGICAL PROCUREMENT, VEIN, ENDOSCOPIC (Bilateral)      Indwelling Lines/Drains at time of discharge:   Lines/Drains/Airways       Drain  Duration                  Chest Tube 01/31/24 0933 Mediastinal 24 Fr. 6 days                  Hospital Course: No notes on file    Goals of Care Treatment Preferences:  Code Status: Full Code      Consults (From admission, onward)          Status Ordering Provider     Inpatient consult to Cardiology  Once        Provider:  Frank Carlos MD    Completed JUVENTINO OWEN IV            Significant Diagnostic Studies: Labs: CMP   Recent Labs   Lab 02/05/24  0456 02/06/24  0602    137   K 4.1 3.6   CO2 30 30   BUN 23.6 20.5   CREATININE 1.57* 1.41*   CALCIUM 8.1* 8.3*   ALBUMIN 3.2* 3.0*   BILITOT 1.2 0.9   ALKPHOS 129 140   AST 47* 53*   ALT 47 69*    and CBC   Recent Labs   Lab 02/05/24  0456 02/06/24  0602   WBC 9.60 7.94   HGB 9.7* 9.2*   HCT 30.1* 28.5*    198       Pending Diagnostic Studies:       None            No new Assessment & Plan notes have been filed under this hospital service since the last note was generated.  Service: Cardiovascular Surgery    There are no hospital problems to display for this patient.     Discharged Condition: good    Disposition: Home or Self Care    Follow Up:   [facility]    Specialty: Home Health Services  Why: [facility] will call you to schedule follow up appt  Contact information:  [address]  [place] [id number]  [phone]               Frank Carlos MD Follow up on 2/15/2024.    Specialty: Cardiology  Why: @ 8:00AM for lab work  Contact information:  [address] [place] [address]  [facility] [place] [address]  [phone]               Frank Carlos MD Follow up on 2/22/2024.    Specialty: Cardiology  Why: @ 9:50AM for follow up.  Contact information:  [address] [place] [address]  [phone]               Juventino Ferrell IV, MD Follow up on 2/27/2024.    Specialty: Cardiothoracic Surgery  Why: @9:40AM  Contact information:  [address] [place] [address] Drive  [address]  [place] [address]  [phone]                           Patient Instructions:   No discharge procedures on file.  Medications:  Reconciled Home Medications:      Medication List        START taking these medications      apixaban 5 mg Tab  Commonly known as: ELIQUIS  Take 1 tablet (5 mg total) by mouth 2 (two) times daily.     HYDROcodone-acetaminophen 5-325 mg per tablet  Commonly known as: NORCO  Take 1 tablet by mouth every 6 (six) hours as needed for Pain.            CHANGE how you take these medications      metoprolol tartrate 50 MG tablet  Commonly known as: LOPRESSOR  Take 1 tablet (50 mg total) by mouth 2 (two) times daily.  What changed:   medication strength  how much to take     * tacrolimus 1 MG Cap  Commonly known as: PROGRAF  Take 1 mg by mouth. 1 mg in the am and 0.5mg in the pm  What changed: Another medication with the same name was added. Make sure you understand how and when to take each.     * tacrolimus 0.5 MG Cap  Commonly known as: PROGRAF  Take 1 capsule (0.5 mg total) by mouth every evening.  What changed: You were already taking a medication with the same name, and this prescription was added. Make sure you understand how and when to take each.     * tacrolimus 1 MG Cap  Commonly known as: PROGRAF  Take 1 capsule (1 mg total)  by mouth every morning.  Start taking on: February 7, 2024  What changed: You were already taking a medication with the same name, and this prescription was added. Make sure you understand how and when to take each.           * This list has 3 medication(s) that are the same as other medications prescribed for you. Read the directions carefully, and ask your doctor or other care provider to review them with you.                CONTINUE taking these medications      alendronate 35 MG tablet  Commonly known as: FOSAMAX  Take 35 mg by mouth every 7 days. Mondays     aspirin 81 MG EC tablet  Commonly known as: ECOTRIN  Take 81 mg by mouth once.     atorvastatin 20 MG tablet  Commonly known as: LIPITOR  Take 20 mg by mouth every evening.     cholecalciferol (vitamin D3) 1,250 mcg (50,000 unit) capsule  Take 50,000 Units by mouth every 7 days. Monday     EScitalopram oxalate 10 MG tablet  Commonly known as: LEXAPRO  Take 10 mg by mouth every morning.     magnesium oxide 400 mg (241.3 mg magnesium) tablet  Commonly known as: MAG-OX  Take 400 mg by mouth 2 (two) times daily.     rifAXIMin 550 mg Tab  Commonly known as: XIFAXAN  Take 550 mg by mouth 2 (two) times daily.            STOP taking these medications      amLODIPine 5 MG tablet  Commonly known as: NORVASC     clopidogreL 75 mg tablet  Commonly known as: PLAVIX     olmesartan 20 MG tablet  Commonly known as: BENICAR            Time spent on the discharge of patient: 30 minutes    FER Joy  Cardiothoracic Surgery  [facility] - [facility] Tele[facility]

## 2024-02-06 NOTE — PROGRESS NOTES
CT SURGERY PROGRESS NOTE  Radu Rock  64 y.o.  1960    Patients Procedure: Procedure(s) (LRB):  CORONARY ARTERY BYPASS GRAFT (CABG) (N/A)  SURGICAL PROCUREMENT, VEIN, ENDOSCOPIC (Bilateral)    Subjective  Interval History: Patient is postoperative day 6 from CABG.  No acute events overnight.  Sitting up in bed, no distress.  Wife at bedside.  Reports feeling much better today.  Oxygenating well on room air.  Afib RVR for a brief period yesterday around noon.  CIS aware and managing.     Review of Systems   Constitutional:  Negative for chills, fever and malaise/fatigue.   Respiratory:  Negative for cough, shortness of breath and wheezing.    Cardiovascular:  Positive for chest pain (incisional). Negative for palpitations and leg swelling.   Gastrointestinal:  Negative for nausea and vomiting.    Medication List  Infusions   loperamide       Scheduled   apixaban  5 mg Oral BID    aspirin  81 mg Oral Daily    docusate sodium  100 mg Oral BID    EScitalopram oxalate  10 mg Oral Daily    famotidine  20 mg Oral BID    folic acid  1 mg Oral Daily    furosemide  40 mg Oral Daily    metoprolol tartrate  50 mg Oral BID    sucralfate  1 g Oral QID (AC & HS)    tacrolimus  0.5 mg Oral Daily PM    tacrolimus  1 mg Oral Daily AM       Objective:  Recent Vitals:  Temp:  [98.4 °F (36.9 °C)-99.1 °F (37.3 °C)] 98.4 °F (36.9 °C)  Pulse:  [] 67  Resp:  [16-18] 16  SpO2:  [91 %-98 %] 93 %  BP: ()/(60-72) 112/71    Physical Exam  Constitutional:       General: He is not in acute distress.     Appearance: He is obese. He is not ill-appearing.   Cardiovascular:      Rate and Rhythm: Normal rate and regular rhythm.      Pulses: Normal pulses.      Heart sounds: Normal heart sounds.   Pulmonary:      Effort: Pulmonary effort is normal. No respiratory distress.      Breath sounds: No wheezing, rales, or rhonchi.  Breath sound clear to auscultation bilaterally.   Abdominal:      General: Bowel sounds are normal. There  is no distension.      Palpations: Abdomen is soft.   Skin:     General: Skin is warm and dry.      Comments: Median sternotomy incision teresa - c/d/i   Neurological:      General: No focal deficit present.      Mental Status: He is alert and oriented to person, place, and time.   Psychiatric:         Mood and Affect: Mood normal.         Behavior: Behavior normal.     I/O last 24 hrs:  Intake/Output - Last 3 Shifts         02/04 0700  02/05 0659 02/05 0700 02/06 0659 02/06 0700  02/07 0659    P.O. 717 240     Total Intake(mL/kg) 717 (7.2) 240 (2.4)     Urine (mL/kg/hr) 2900 (1.2) 1000 (0.4)     Stool  0     Chest Tube       Total Output 2900 1000     Net -2183 -760            Stool Occurrence  0 x             Labs  CBC:   Recent Labs   Lab 02/06/24 0602   WBC 7.94   RBC 3.09*   HGB 9.2*   HCT 28.5*      MCV 92.2   MCH 29.8   MCHC 32.3*     CMP:   Recent Labs   Lab 02/06/24 0602   CALCIUM 8.3*   ALBUMIN 3.0*      K 3.6   CO2 30   BUN 20.5   CREATININE 1.41*   ALKPHOS 140   ALT 69*   AST 53*   BILITOT 0.9     LFTs:   Recent Labs   Lab 02/06/24 0602   ALT 69*   AST 53*   ALKPHOS 140   BILITOT 0.9   ALBUMIN 3.0*     All pertinent labs from the last 24 hours have been reviewed.      ASSESSMENT/PLAN:    Coronary artery disease  -s/p CABG  Acute on chronic diastolic heart failure   HTN  BRIAN  Cirrhosis / Liver cancer / HCV s/p Liver transplant    -LFTs slightly more elevated again today.  Discussed with CIS, will continue to hold statin.    -Renal indices better this morning - Cr 1.41.  Adequate urine output.  -Afib management per cardiology  -OK to d/c from CT surgery standpoint.  Keep scheduled follow up appointment with Dr. Carlos.  Follow up with Dr. Ferrell in 3 weeks.     Case and plan of care discussed with Dr. Ghassan Ash, BECKAP

## 2024-02-06 NOTE — PLAN OF CARE
02/06/24 1221   Final Note   Assessment Type Final Discharge Note   Anticipated Discharge Disposition Home-Health   Post-Acute Status   Post-Acute Authorization Home Health   Home Health Status Set-up Complete/Auth obtained     Dc to home with pt choice- [facility]

## 2024-02-06 NOTE — PROGRESS NOTES
"  [facility]    Cardiology  Progress Note    Patient Name: Radu Rock  MRN: [id number]  Admission Date: 1/31/2024  Hospital Length of Stay: 6 days  Code Status: Full Code   Attending Provider: Juventino Ferrell IV, MD   Consulting Provider: [facility] [place]  Primary Care Physician: Mariella Huntley MD  Principal Problem:<principal problem not specified>    Patient information was obtained from patient, past medical records, and ER records.     Subjective:     Chief Complaint: Reason for Consult: Post CABG     HPI: Mr. Rock is a 63 y/o male who is known to [facility], Dr. Carlos. The patient presented to [facility] on 1.31.24 for a Planned CABG with CV Surgery/Dr. Ferrell. He recently underwent a workup with Nuclear PET which was abnormal, prompting a C with noted MVCAD. He was referred to CV Surgery for CABG Evaluation and she was deemed a candidate for CABG. On 1.31.24 he underwent a CABG with results of LIMA to LAD, rSVG to OM, rSVG to PDA and Bilateral EVH. He tolerated the procedure and was admitted for observation in the ICU and further management. [facility] has been consulted for Post Operative Medical Management.     2.1.24: NAD. "I am good." Sitting in Bedside Chair. Denies SOB and Palps. + CP/Incisional.   2.2.24: NAD. "I am a tad SOB." + SOB, + CP/Incisional. Denies Palps. SBP 90s.HR 100s.  2.3.24: NAD. "I am good." + CP/Incisional. Denies Palps and SOB. + SIMTH Only. Remains in SR. Fluid Balance Net Negative 2340mL. Labs Ordered/Not Drawn.   2.4.24: NAD. "I am SOB." + CP/Incisional. + SOB. Remains in SR. Fluid Balance Net Negative 3170mL.  2.5.24: NAD. "I feel so much better." + Incisional CP. Denies SOB. Went into afib RVR. Voided 2900 mL/24 hours.   2.6.24: NAD. Denies SOB or palps. + Incisional CP. "I feel good." SR on tele. Mild RVR this am.     PMH: HLD, HTN, MVCAD/CABG, Liver Transplant, Liver Transplant/Cirrhosis, Liver Cancer, HCV/Hepatitis C Virus, MI, Obesity, Osteoporosis, " TAY/CPAP  PSH: Liver Transplant, CABG, Angiogram, Cholecystectomy, Appendectomy, LE Surgery, Shoulder Surgery, EGD  Family History: Denies Family History of Heart Disease  Social History: Denies Illicit Drug, ETOH and Tobacco Use    Previous Cardiac Diagnostics:   CABG 1.31.24:  LIMA to LAD, rSVG to OM, rSVG to PDA, Bilateral EVH.    [facility] 1.22.24:  Procedures performed:  1. Ultrasound-guided right radial access  2. Coronary angiography  3. Left ventricular hemodynamics  4. IFR interrogation  -LAD 0.82  -circumflex 0.88  -PDA 0.91  5. LV EDP-19  6. Lima-angio  Findings:  1. Left main-normal  2. Lad-60-70% lad mid ISR involving large diagonal, IFR positive, 0.82 in LAD  3. LCX-non dominant, 80% proximal lesion extending into OM2, OM1 70% ostial lesion  4. RCA-dominant, 60% proximal lesion, 80% ostial PDA  5. LIMA patent  Plan:  1. Maximize medical management  2. Consult CTS for bypass evaluation.  If patient is not a candidate will need Laser atherectomy of LAD stent and PCI of Mid LAD, Circumlex, and possibly PDA.  Would re-evalaute PDA at time of PCI, but I think it is bad enough for intervention.  Inferoapical walls are involved on stress test.    PET 1.19.24:  This is an abnormal perfusion study.   This scan is suggestive of moderate to high risk for future cardiovascular events.   Small partially reversible perfusion abnormality of severe intensity in the apical segment. Small reversible perfusion abnormality of moderate intensity in the apical anterior segment. Small partially reversible perfusion abnormality of severe intensity in the apical septal segment. Small partially reversible perfusion abnormality of severe intensity in the apical inferior segment.   The left ventricular cavity is noted to be normal on the stress studies. The stress left ventricular ejection fraction was calculated to be 64% and left ventricular global function is normal. The rest left ventricular cavity is noted to be normal. The rest  left ventricular ejection fraction was calculated to be 64% and rest left ventricular global function is normal.   Compared with rest and stress studies the ejection fraction remains unchanged (64).   The study quality is average.   There was a rise in myocardial blood flow between rest and stress.  Global myocardial blood flow reserve was 3.01.  Normal global coronary flow reserve is suggestive of low coronary event risk.    ECHO 1.10.24:  The left ventricle is normal in size with moderate to severe, asymmetric septal left ventricular hypertrophy and mild, asymmetric posterior left ventricular hypertrophy and normal left ventricular systolic function. The left ventricular ejection fraction is 65%. Left ventricular diastolic function is normal. Bradycardia is noted.  Mild dilatation of the aortic root is noted at 3.9 cms. The aortic arch is normal at 3.1 cms.   Unable to adequately visualize and assess the pulmonary artery.  Poor short axis echo windows.  The study quality is average.     Review of patient's allergies indicates:  No Known Allergies    No current facility-administered medications on file prior to encounter.     Current Outpatient Medications on File Prior to Encounter   Medication Sig    alendronate (FOSAMAX) 35 MG tablet Take 35 mg by mouth every 7 days. Mondays    amLODIPine (NORVASC) 5 MG tablet Take 5 mg by mouth nightly.    aspirin (ECOTRIN) 81 MG EC tablet Take 81 mg by mouth once.    atorvastatin (LIPITOR) 20 MG tablet Take 20 mg by mouth every evening.    EScitalopram oxalate (LEXAPRO) 10 MG tablet Take 10 mg by mouth every morning.    magnesium oxide (MAG-OX) 400 mg (241.3 mg magnesium) tablet Take 400 mg by mouth 2 (two) times daily.    metoprolol tartrate (LOPRESSOR) 25 MG tablet Take 25 mg by mouth 2 (two) times daily.    olmesartan (BENICAR) 20 MG tablet Take 20 mg by mouth once daily.    rifAXIMin (XIFAXAN) 550 mg Tab Take 550 mg by mouth 2 (two) times daily.    tacrolimus (PROGRAF) 1  MG Cap Take 1 mg by mouth. 1 mg in the am and 0.5mg in the pm    cholecalciferol, vitamin D3, 1,250 mcg (50,000 unit) capsule Take 50,000 Units by mouth every 7 days. Monday    clopidogreL (PLAVIX) 75 mg tablet Take 75 mg by mouth once daily.     Review of Systems   Constitutional:  Positive for fatigue.   Respiratory:  Negative for shortness of breath.    Cardiovascular:  Positive for chest pain (Incisional). Negative for palpitations and leg swelling.   All other systems reviewed and are negative.    Objective:     Vital Signs (Most Recent):  Temp: 98.4 °F (36.9 °C) (02/06/24 0917)  Pulse: 67 (02/06/24 1042)  Resp: 16 (02/06/24 1041)  BP: 112/71 (02/06/24 1042)  SpO2: (!) 93 % (02/06/24 0917) Vital Signs (24h Range):  Temp:  [98.4 °F (36.9 °C)-99.1 °F (37.3 °C)] 98.4 °F (36.9 °C)  Pulse:  [] 67  Resp:  [16-18] 16  SpO2:  [91 %-98 %] 93 %  BP: ()/(60-72) 112/71     Weight: 99.4 kg (219 lb 2.2 oz)  Body mass index is 31.44 kg/m².    SpO2: (!) 93 %         Intake/Output Summary (Last 24 hours) at 2/6/2024 1046  Last data filed at 2/6/2024 0616  Gross per 24 hour   Intake 240 ml   Output 1000 ml   Net -760 ml       Lines/Drains/Airways       Drain  Duration                  Chest Tube 01/31/24 0933 Mediastinal 24 Fr. 6 days              Peripheral Intravenous Line  Duration                  Peripheral IV - Single Lumen 01/31/24 0540 18 G Anterior;Distal;Left Forearm 6 days                  Significant Labs:  Recent Results (from the past 72 hour(s))   Comprehensive Metabolic Panel    Collection Time: 02/03/24 12:37 PM   Result Value Ref Range    Sodium Level 133 (L) 136 - 145 mmol/L    Potassium Level 4.5 3.5 - 5.1 mmol/L    Chloride 95 (L) 98 - 107 mmol/L    Carbon Dioxide 25 23 - 31 mmol/L    Glucose Level 162 (H) 82 - 115 mg/dL    Blood Urea Nitrogen 21.0 8.4 - 25.7 mg/dL    Creatinine 1.53 (H) 0.73 - 1.18 mg/dL    Calcium Level Total 8.1 (L) 8.8 - 10.0 mg/dL    Protein Total 6.6 5.8 - 7.6 gm/dL     Albumin Level 3.1 (L) 3.4 - 4.8 g/dL    Globulin 3.5 2.4 - 3.5 gm/dL    Albumin/Globulin Ratio 0.9 (L) 1.1 - 2.0 ratio    Bilirubin Total 1.1 <=1.5 mg/dL    Alkaline Phosphatase 70 40 - 150 unit/L    Alanine Aminotransferase 20 0 - 55 unit/L    Aspartate Aminotransferase 30 5 - 34 unit/L    eGFR 50 mls/min/1.73/m2   Magnesium    Collection Time: 02/03/24 12:37 PM   Result Value Ref Range    Magnesium Level 2.30 1.60 - 2.60 mg/dL   CBC with Differential    Collection Time: 02/03/24 12:37 PM   Result Value Ref Range    WBC 11.00 4.50 - 11.50 x10(3)/mcL    RBC 2.82 (L) 4.70 - 6.10 x10(6)/mcL    Hgb 8.5 (L) 14.0 - 18.0 g/dL    Hct 26.5 (L) 42.0 - 52.0 %    MCV 94.0 80.0 - 94.0 fL    MCH 30.1 27.0 - 31.0 pg    MCHC 32.1 (L) 33.0 - 36.0 g/dL    RDW 14.6 11.5 - 17.0 %    Platelet 96 (L) 130 - 400 x10(3)/mcL    MPV 13.2 (H) 7.4 - 10.4 fL    Neut % 79.2 %    Lymph % 10.9 %    Mono % 8.3 %    Eos % 0.8 %    Basophil % 0.3 %    Lymph # 1.20 0.6 - 4.6 x10(3)/mcL    Neut # 8.71 2.1 - 9.2 x10(3)/mcL    Mono # 0.91 0.1 - 1.3 x10(3)/mcL    Eos # 0.09 0 - 0.9 x10(3)/mcL    Baso # 0.03 <=0.2 x10(3)/mcL    IG# 0.06 (H) 0 - 0.04 x10(3)/mcL    IG% 0.5 %    NRBC% 0.0 %    IPF 18.1 (H) 0.9 - 11.2 %   Comprehensive Metabolic Panel    Collection Time: 02/04/24  7:12 AM   Result Value Ref Range    Sodium Level 135 (L) 136 - 145 mmol/L    Potassium Level 4.4 3.5 - 5.1 mmol/L    Chloride 98 98 - 107 mmol/L    Carbon Dioxide 28 23 - 31 mmol/L    Glucose Level 121 (H) 82 - 115 mg/dL    Blood Urea Nitrogen 22.0 8.4 - 25.7 mg/dL    Creatinine 1.41 (H) 0.73 - 1.18 mg/dL    Calcium Level Total 7.7 (L) 8.8 - 10.0 mg/dL    Protein Total 5.7 (L) 5.8 - 7.6 gm/dL    Albumin Level 3.0 (L) 3.4 - 4.8 g/dL    Globulin 2.7 2.4 - 3.5 gm/dL    Albumin/Globulin Ratio 1.1 1.1 - 2.0 ratio    Bilirubin Total 1.0 <=1.5 mg/dL    Alkaline Phosphatase 89 40 - 150 unit/L    Alanine Aminotransferase 21 0 - 55 unit/L    Aspartate Aminotransferase 34 5 - 34 unit/L    eGFR  55 mls/min/1.73/m2   Magnesium    Collection Time: 02/04/24  7:12 AM   Result Value Ref Range    Magnesium Level 2.50 1.60 - 2.60 mg/dL   CBC with Differential    Collection Time: 02/04/24  7:12 AM   Result Value Ref Range    WBC 8.47 4.50 - 11.50 x10(3)/mcL    RBC 2.78 (L) 4.70 - 6.10 x10(6)/mcL    Hgb 8.5 (L) 14.0 - 18.0 g/dL    Hct 25.2 (L) 42.0 - 52.0 %    MCV 90.6 80.0 - 94.0 fL    MCH 30.6 27.0 - 31.0 pg    MCHC 33.7 33.0 - 36.0 g/dL    RDW 14.8 11.5 - 17.0 %    Platelet 124 (L) 130 - 400 x10(3)/mcL    MPV 12.6 (H) 7.4 - 10.4 fL    Neut % 72.4 %    Lymph % 13.7 %    Mono % 11.1 %    Eos % 2.0 %    Basophil % 0.2 %    Lymph # 1.16 0.6 - 4.6 x10(3)/mcL    Neut # 6.13 2.1 - 9.2 x10(3)/mcL    Mono # 0.94 0.1 - 1.3 x10(3)/mcL    Eos # 0.17 0 - 0.9 x10(3)/mcL    Baso # 0.02 <=0.2 x10(3)/mcL    IG# 0.05 (H) 0 - 0.04 x10(3)/mcL    IG% 0.6 %    NRBC% 0.0 %   Comprehensive Metabolic Panel    Collection Time: 02/05/24  4:56 AM   Result Value Ref Range    Sodium Level 139 136 - 145 mmol/L    Potassium Level 4.1 3.5 - 5.1 mmol/L    Chloride 97 (L) 98 - 107 mmol/L    Carbon Dioxide 30 23 - 31 mmol/L    Glucose Level 125 (H) 82 - 115 mg/dL    Blood Urea Nitrogen 23.6 8.4 - 25.7 mg/dL    Creatinine 1.57 (H) 0.73 - 1.18 mg/dL    Calcium Level Total 8.1 (L) 8.8 - 10.0 mg/dL    Protein Total 6.2 5.8 - 7.6 gm/dL    Albumin Level 3.2 (L) 3.4 - 4.8 g/dL    Globulin 3.0 2.4 - 3.5 gm/dL    Albumin/Globulin Ratio 1.1 1.1 - 2.0 ratio    Bilirubin Total 1.2 <=1.5 mg/dL    Alkaline Phosphatase 129 40 - 150 unit/L    Alanine Aminotransferase 47 0 - 55 unit/L    Aspartate Aminotransferase 47 (H) 5 - 34 unit/L    eGFR 48 mls/min/1.73/m2   Magnesium    Collection Time: 02/05/24  4:56 AM   Result Value Ref Range    Magnesium Level 2.50 1.60 - 2.60 mg/dL   CBC with Differential    Collection Time: 02/05/24  4:56 AM   Result Value Ref Range    WBC 9.60 4.50 - 11.50 x10(3)/mcL    RBC 3.25 (L) 4.70 - 6.10 x10(6)/mcL    Hgb 9.7 (L) 14.0 - 18.0  g/dL    Hct 30.1 (L) 42.0 - 52.0 %    MCV 92.6 80.0 - 94.0 fL    MCH 29.8 27.0 - 31.0 pg    MCHC 32.2 (L) 33.0 - 36.0 g/dL    RDW 14.6 11.5 - 17.0 %    Platelet 173 130 - 400 x10(3)/mcL    MPV 11.6 (H) 7.4 - 10.4 fL    Neut % 66.4 %    Lymph % 17.6 %    Mono % 11.0 %    Eos % 3.9 %    Basophil % 0.5 %    Lymph # 1.69 0.6 - 4.6 x10(3)/mcL    Neut # 6.37 2.1 - 9.2 x10(3)/mcL    Mono # 1.06 0.1 - 1.3 x10(3)/mcL    Eos # 0.37 0 - 0.9 x10(3)/mcL    Baso # 0.05 <=0.2 x10(3)/mcL    IG# 0.06 (H) 0 - 0.04 x10(3)/mcL    IG% 0.6 %    NRBC% 0.2 %   EKG 12-lead    Collection Time: 02/05/24 11:46 AM   Result Value Ref Range    QRS Duration 94 ms    [facility] QTC Calculation 525 ms   EKG 12-lead    Collection Time: 02/05/24  1:55 PM   Result Value Ref Range    QRS Duration 90 ms    [facility] QTC Calculation 510 ms   Comprehensive Metabolic Panel    Collection Time: 02/06/24  6:02 AM   Result Value Ref Range    Sodium Level 137 136 - 145 mmol/L    Potassium Level 3.6 3.5 - 5.1 mmol/L    Chloride 98 98 - 107 mmol/L    Carbon Dioxide 30 23 - 31 mmol/L    Glucose Level 113 82 - 115 mg/dL    Blood Urea Nitrogen 20.5 8.4 - 25.7 mg/dL    Creatinine 1.41 (H) 0.73 - 1.18 mg/dL    Calcium Level Total 8.3 (L) 8.8 - 10.0 mg/dL    Protein Total 5.8 5.8 - 7.6 gm/dL    Albumin Level 3.0 (L) 3.4 - 4.8 g/dL    Globulin 2.8 2.4 - 3.5 gm/dL    Albumin/Globulin Ratio 1.1 1.1 - 2.0 ratio    Bilirubin Total 0.9 <=1.5 mg/dL    Alkaline Phosphatase 140 40 - 150 unit/L    Alanine Aminotransferase 69 (H) 0 - 55 unit/L    Aspartate Aminotransferase 53 (H) 5 - 34 unit/L    eGFR 55 mls/min/1.73/m2   Magnesium    Collection Time: 02/06/24  6:02 AM   Result Value Ref Range    Magnesium Level 2.50 1.60 - 2.60 mg/dL   CBC with Differential    Collection Time: 02/06/24  6:02 AM   Result Value Ref Range    WBC 7.94 4.50 - 11.50 x10(3)/mcL    RBC 3.09 (L) 4.70 - 6.10 x10(6)/mcL    Hgb 9.2 (L) 14.0 - 18.0 g/dL    Hct 28.5 (L) 42.0 - 52.0 %    MCV 92.2 80.0 - 94.0 fL    MCH 29.8  27.0 - 31.0 pg    MCHC 32.3 (L) 33.0 - 36.0 g/dL    RDW 14.8 11.5 - 17.0 %    Platelet 198 130 - 400 x10(3)/mcL    MPV 11.6 (H) 7.4 - 10.4 fL    Neut % 67.0 %    Lymph % 17.4 %    Mono % 11.1 %    Eos % 3.5 %    Basophil % 0.5 %    Lymph # 1.38 0.6 - 4.6 x10(3)/mcL    Neut # 5.32 2.1 - 9.2 x10(3)/mcL    Mono # 0.88 0.1 - 1.3 x10(3)/mcL    Eos # 0.28 0 - 0.9 x10(3)/mcL    Baso # 0.04 <=0.2 x10(3)/mcL    IG# 0.04 0 - 0.04 x10(3)/mcL    IG% 0.5 %    NRBC% 0.0 %     Telemetry: SR    Physical Exam  Constitutional:       General: He is not in acute distress.     Appearance: Normal appearance.   HENT:      Head: Normocephalic.      Mouth/Throat:      Mouth: Mucous membranes are moist.   Eyes:      Conjunctiva/sclera: Conjunctivae normal.   Cardiovascular:      Rate and Rhythm: Tachycardia present. Rhythm irregular.      Pulses: Normal pulses.      Heart sounds: Normal heart sounds. No murmur heard.  Pulmonary:      Effort: Pulmonary effort is normal.      Breath sounds: Normal breath sounds.      Comments: RA  Abdominal:      Palpations: Abdomen is soft.   Musculoskeletal:      Right lower leg: No edema.      Left lower leg: No edema.   Skin:     General: Skin is warm and dry.      Comments: Midline Sternotomy Dressing C/D/I   Neurological:      General: No focal deficit present.      Mental Status: He is alert and oriented to person, place, and time. Mental status is at baseline.   Psychiatric:         Mood and Affect: Mood normal.         Behavior: Behavior normal.         Judgment: Judgment normal.       Home Medications:   No current facility-administered medications on file prior to encounter.     Current Outpatient Medications on File Prior to Encounter   Medication Sig Dispense Refill    alendronate (FOSAMAX) 35 MG tablet Take 35 mg by mouth every 7 days. Mondays      amLODIPine (NORVASC) 5 MG tablet Take 5 mg by mouth nightly.      aspirin (ECOTRIN) 81 MG EC tablet Take 81 mg by mouth once.      atorvastatin  (LIPITOR) 20 MG tablet Take 20 mg by mouth every evening.      EScitalopram oxalate (LEXAPRO) 10 MG tablet Take 10 mg by mouth every morning.      magnesium oxide (MAG-OX) 400 mg (241.3 mg magnesium) tablet Take 400 mg by mouth 2 (two) times daily.      metoprolol tartrate (LOPRESSOR) 25 MG tablet Take 25 mg by mouth 2 (two) times daily.      olmesartan (BENICAR) 20 MG tablet Take 20 mg by mouth once daily.      rifAXIMin (XIFAXAN) 550 mg Tab Take 550 mg by mouth 2 (two) times daily.      tacrolimus (PROGRAF) 1 MG Cap Take 1 mg by mouth. 1 mg in the am and 0.5mg in the pm      cholecalciferol, vitamin D3, 1,250 mcg (50,000 unit) capsule Take 50,000 Units by mouth every 7 days. Monday      clopidogreL (PLAVIX) 75 mg tablet Take 75 mg by mouth once daily.       Current Inpatient Medications:    Current Facility-Administered Medications:     acetaminophen oral solution 650 mg, 650 mg, Per OG tube, Q6H PRN, Theron Morris PA-C    albumin human 5% bottle 12.5 g, 12.5 g, Intravenous, PRN, Theron Morris PA-C, Stopped at 01/31/24 1302    apixaban tablet 5 mg, 5 mg, Oral, BID, Rosenda Kolb FNP, 5 mg at 02/06/24 0912    aspirin EC tablet 81 mg, 81 mg, Oral, Daily, Theron Morris PA-C, 81 mg at 02/06/24 0912    calcium gluconate 1 g in NS IVPB (premixed), 1 g, Intravenous, PRN, Theron Morris PA-C    calcium gluconate 1 g in NS IVPB (premixed), 2 g, Intravenous, PRN, Theron Morris PA-C    calcium gluconate 1 g in NS IVPB (premixed), 3 g, Intravenous, PRN, Theron Morris PA-C    dextrose 10% bolus 125 mL 125 mL, 12.5 g, Intravenous, PRN, Theron Morris PA-C    dextrose 10% bolus 250 mL 250 mL, 25 g, Intravenous, PRN, Theron Morris PA-C    docusate sodium capsule 100 mg, 100 mg, Oral, BID, Theron Morris PA-C, 100 mg at 02/06/24 0913    EScitalopram oxalate tablet 10 mg, 10 mg, Oral, Daily, Juventino Ferrell IV, MD, 10 mg at 02/06/24 0913    famotidine tablet 20 mg, 20 mg, Oral, BID, Joey  MD Kristie, 20 mg at 02/06/24 0913    folic acid tablet 1 mg, 1 mg, Oral, Daily, Theron Morris PA-C, 1 mg at 02/06/24 0913    furosemide tablet 40 mg, 40 mg, Oral, Daily, Rosenda Kolb FNP, 40 mg at 02/06/24 0913    HYDROcodone-acetaminophen 5-325 mg per tablet 1 tablet, 1 tablet, Oral, Q4H PRN, Theron Morris PA-C, 1 tablet at 02/06/24 1041    lactulose 10 gram/15 ml solution 20 g, 20 g, Oral, Q6H PRN, Theron Morris PA-C    loperamide capsule 2 mg, 2 mg, Oral, Continuous PRN, Theron Morris PA-C    magnesium sulfate 2g in water 50mL IVPB (premix), 2 g, Intravenous, PRN, Theron Morris PA-C    magnesium sulfate 2g in water 50mL IVPB (premix), 4 g, Intravenous, PRN, Theron Morris PA-C    metoclopramide injection 5 mg, 5 mg, Intravenous, Q6H PRN, Theron Morris PA-C    metoprolol tartrate (LOPRESSOR) tablet 50 mg, 50 mg, Oral, BID, Rosenda Kolb FNP    morphine injection 4 mg, 4 mg, Intravenous, Q4H PRN, Theron Morris PA-C, 4 mg at 01/31/24 1051    ondansetron injection 4 mg, 4 mg, Intravenous, Q4H PRN, Theron Morris PA-C, 4 mg at 01/31/24 2044    oxyCODONE immediate release tablet 5 mg, 5 mg, Oral, Q4H PRN, Juventino Ferrell IV, MD, 5 mg at 02/04/24 1546    potassium chloride 20 mEq in 100 mL IVPB (FOR CENTRAL LINE ADMINISTRATION ONLY), 20 mEq, Intravenous, PRN, Theron Morris PA-C    potassium chloride 20 mEq in 100 mL IVPB (FOR CENTRAL LINE ADMINISTRATION ONLY), 40 mEq, Intravenous, PRN, Theron Morris PA-C    potassium chloride 20 mEq in 100 mL IVPB (FOR CENTRAL LINE ADMINISTRATION ONLY), 60 mEq, Intravenous, PRN, Arturo, Theron C, PA-C    sodium phosphate 15 mmol in dextrose 5 % (D5W) 250 mL IVPB, 15 mmol, Intravenous, Arturo ACEVEDO John C, PA-C    sodium phosphate 20.01 mmol in dextrose 5 % (D5W) 250 mL IVPB, 20.01 mmol, Intravenous, Arturo ACEVEDO John C, PA-C    sodium phosphate 30 mmol in dextrose 5 % (D5W) 250 mL IVPB, 30 mmol, Intravenous, PRN, Arturo,  Theron VANN PA-C    sucralfate tablet 1 g, 1 g, Oral, QID (AC & HS), Theron Morris PA-C, 1 g at 02/06/24 0913    tacrolimus capsule 0.5 mg, 0.5 mg, Oral, Daily PM, ProgresoSkyFER Sherman, 0.5 mg at 02/05/24 1710    tacrolimus capsule 1 mg, 1 mg, Oral, Daily AM, Josh Solano MD, 1 mg at 02/06/24 0912    VTE Risk Mitigation (From admission, onward)           Ordered     apixaban tablet 5 mg  2 times daily         02/05/24 1321     Place sequential compression device  Until discontinued         01/31/24 1008     IP VTE HIGH RISK PATIENT  Once         01/31/24 0840                  Assessment:   MVCAD    - CABG (1.31.24) - LIMA to LAD, rSVG to OM, rSVG to PDA, Bilateral EVH    - LHC (1.22.24) - 1. Left main-normal 2. Lad-60-70% lad mid ISR involving large diagonal, IFR positive, 0.82 in LAD 3. LCX-non dominant, 80% proximal lesion extending into OM2, OM1 70% ostial lesion 4. RCA-dominant, 60% proximal lesion, 80% ostial PDA 5. LIMA patent    - ECHO 1.10.24 - LVEF 65%  Newly Diagnosed Afib RVR (Post-op)    - CHADSVASC Score 2 Points  Acute on Chronic Diastolic HF/EF 65% - Resolved     - CXR (2.2.24) - Pulmonary Vascular Congestion   Febrile - Resolved   BRIAN - Improving  HTN - Borderline BP   HLD  Cirrhosis/Liver Cancer/HCV s/p Liver Transplant  Anemia   Hx of MI  Obesity  Osteoporosis  TAY/CPAP  Leukocytosis - Resolved  Thrombocytopenia - Resolved  No Hx of GIB     Plan:   Continue ASA   Hold Statin  Hx of Liver Transplant/Cautious Use of Statin - LFT's slightly worsened today. Will hold statin. Address in the outpatient setting.  Increase to metoprolol tartrate 50 mg BID. Went into RVR this am but back in NSR.   Continue Eliuqis 5 mg BID for CVA prophylaxis in the setting of PAF.   Continue Lasix 40 mg PO daily.   Encourage Aggressive Mobilization of PT and Q1HR IS  Stable for discharge from cardiac standpoint.  Needs CBC & CMP in 3-5 days.  Hospital f/u with Dr. Gunter in 7-10 days.   Will refer to [facility].      Rosenda Kolb, [facility]  Cardiology  [facility]  02/06/2024     Pt seen and examined by me, Walter Serrano MD. I have reviewed the NP's note, assessment and plan. I have personally interviewed and examined the patient at bedside and agree with the findings. Medical decision making listed above were done under my guidance.     Physical exam:  NAD  Cardiovascular system: regular rhythm, no murmur.  Lungs: CTAB.  Abd: S/ST/ND  Extremities: No leg edema.      Assessment and Plan:  Status post CABG and doing well.  Plan for discharge today.  LFTs mildly elevated and statin placed on hold.  We will resume his outpatient and closely follow LFTs afterwards.  We will enroll in [facility] under Dr. Carlos  -follow up with Dr. Carlos in 7-10 days  -OAC for now given post op AF (developed short zafar of RVR this morning)  -2-wk MCT in 1 mo   -advanced beta-blocker

## 2024-02-06 NOTE — NURSING
Pt educated on post op CABG. Pt educated on new meds. Pt will follow up with cardio, PCP, CV sx, and home health. VSS. IV and tele removed. No further complaints and verbalizes understanding.

## 2024-02-06 NOTE — PROGRESS NOTES
Inpatient Nutrition Evaluation    Admit Date: 1/31/2024   Total duration of encounter: 6 days   Patient Age: 64 y.o.    Nutrition Recommendation/Prescription     Continue heart healthy diet as tolerated  RD to order Boost TID to provide 240 kcal and 10 g protein per serving  Continue bowel regimen and antiemetic PRN    Nutrition Assessment     Chart Review    Reason Seen: length of stay    Malnutrition Screening Tool Results   Have you recently lost weight without trying?: No  Have you been eating poorly because of a decreased appetite?: No   MST Score: 0   Diagnosis:  S/p CABG 1/31   Newly Diagnosed Afib RVR (Post-op)   Acute on Chronic Diastolic HF/EF 65% - Resolved   Febrile - Resolved   BRIAN - Improving  HTN - Borderline BP    Anemia   Leukocytosis - Resolved  Thrombocytopenia - Resolved    Relevant Medical History: HLD, HTN, CAD, Liver Transplant, Cirrhosis, Liver Cancer, HCV/Hepatitis C Virus, MI, Osteoporosis, TAY/CPAP     Scheduled Medications:  apixaban, 5 mg, BID  aspirin, 81 mg, Daily  docusate sodium, 100 mg, BID  EScitalopram oxalate, 10 mg, Daily  famotidine, 20 mg, BID  folic acid, 1 mg, Daily  furosemide, 40 mg, Daily  metoprolol tartrate, 50 mg, BID  sucralfate, 1 g, QID (AC & HS)  tacrolimus, 0.5 mg, Daily PM  tacrolimus, 1 mg, Daily AM    Continuous Infusions:  loperamide    PRN Medications: acetaminophen, albumin human 5%, calcium gluconate IVPB, calcium gluconate IVPB, calcium gluconate IVPB, dextrose 10%, dextrose 10%, HYDROcodone-acetaminophen, lactulose 10 gram/15 ml, loperamide, magnesium sulfate IVPB, magnesium sulfate IVPB, metoclopramide, morphine, ondansetron, oxyCODONE, potassium chloride in water, potassium chloride in water, potassium chloride in water, sodium phosphate 15 mmol in dextrose 5 % (D5W) 250 mL IVPB, sodium phosphate 20.01 mmol in dextrose 5 % (D5W) 250 mL IVPB, sodium phosphate 30 mmol in dextrose 5 % (D5W) 250 mL IVPB    Recent Labs   Lab 01/31/24  0758 01/31/24  0809  "01/31/24  1128 01/31/24  1508 02/01/24  0419 02/02/24  0217 02/03/24  1237 02/04/24  0712 02/05/24  0456 02/06/24  0602     --   --   --  137 136 133* 135* 139 137   K 4.0  --   --  4.1 4.4 4.6 4.5 4.4 4.1 3.6   CALCIUM 9.5  --   --   --  7.9* 7.6* 8.1* 7.7* 8.1* 8.3*   MG  --   --   --   --   --  2.10 2.30 2.50 2.50 2.50   CHLORIDE 114*  --   --   --  109* 102 95* 98 97* 98   CO2 21*  --   --   --  21* 23 25 28 30 30   BUN 18.7  --   --   --  14.9 19.7 21.0 22.0 23.6 20.5   CREATININE 1.13  --   --   --  1.32* 1.75* 1.53* 1.41* 1.57* 1.41*   EGFRNORACEVR >60  --   --   --  59 42 50 55 48 55   GLUCOSE 159*  --   --   --  129* 114 162* 121* 125* 113   BILITOT  --   --   --   --  0.6 1.1 1.1 1.0 1.2 0.9   ALKPHOS  --   --   --   --  32* 45 70 89 129 140   ALT  --   --   --   --  25 21 20 21 47 69*   AST  --   --   --   --  28 33 30 34 47* 53*   ALBUMIN  --   --   --   --  3.3* 3.3* 3.1* 3.0* 3.2* 3.0*   WBC  --   --  22.69*  --  12.06* 14.67* 11.00 8.47 9.60 7.94   HGB  --    < > 11.0* 10.2* 9.9* 8.9* 8.5* 8.5* 9.7* 9.2*   HCT  --    < > 33.9* 30.1* 30.1* 28.3* 26.5* 25.2* 30.1* 28.5*    < > = values in this interval not displayed.     Nutrition Orders:  Diet heart healthy  Dietary nutrition supplements Boost Vanilla; TID    Appetite/Oral Intake: fair/50-75% of meals  Factors Affecting Nutritional Intake: constipation, decreased appetite, and nausea  Food/Taoist/Cultural Preferences: none reported  Food Allergies: no known food allergies  Last Bowel Movement: 02/04/24  Wound(s):      Comments    2/6: Pt reports fair appetite, improving since admission. Appetite prior admission was good. Agreed to ONS TID. Complained of nausea, controlled with meds, and constipation. Denies unintentional weight loss. Weight appears stable per EMR weight history.    Anthropometrics    Height: 5' 10" (177.8 cm), Height Method: Stated  Last Weight: 99.4 kg (219 lb 2.2 oz) (02/06/24 0616), Weight Method: Standard Scale  BMI " (Calculated): 31.4  BMI Classification: obese grade I (BMI 30-34.9)        Ideal Body Weight (IBW), Male: 166 lb     % Ideal Body Weight, Male (lb): 133.87 %                 Usual Body Weight (UBW), k.1 kg  % Usual Body Weight: 99.51     Usual Weight Provided By: EMR weight history and patient denies unintentional weight loss    Wt Readings from Last 5 Encounters:   24 99.4 kg (219 lb 2.2 oz)   24 100.2 kg (220 lb 14.4 oz)   14 85.4 kg (188 lb 4.8 oz)     Weight Change(s) Since Admission: -1.4 kg  Wt Readings from Last 1 Encounters:   24 0616 99.4 kg (219 lb 2.2 oz)   24 0455 100.1 kg (220 lb 10.9 oz)   24 0620 103.5 kg (228 lb 2.8 oz)   24 0502 106.8 kg (235 lb 6.4 oz)   24 0553 108 kg (238 lb 1.6 oz)   24 0600 106.4 kg (234 lb 9.1 oz)   24 0526 100.8 kg (222 lb 3.6 oz)   24 1344 102.1 kg (225 lb)   Admit Weight: 102.1 kg (225 lb) (24 1344), Weight Method: Stated    Patient Education     Not applicable.    Nutrition Goals & Monitoring     Dietitian will monitor: food and beverage intake, weight, electrolyte/renal panel, glucose/endocrine profile, and gastrointestinal profile    Nutrition Risk/Follow-Up: low (follow-up in 5-7 days)  Patients assigned 'low nutrition risk' status do not qualify for a full nutritional assessment but will be monitored and re-evaluated in a 5-7 day time period. Please consult if re-evaluation needed sooner.

## 2024-02-07 PROCEDURE — G0180 MD CERTIFICATION HHA PATIENT: HCPCS | Mod: ,,, | Performed by: SPECIALIST

## 2024-02-07 NOTE — PHYSICIAN QUERY
PT Name: Radu Rock  MR #: [id number]    DOCUMENTATION CLARIFICATION     CDS/: YUSUF PaceN, RN, CCDS               Contact information: [email or web address]    This form is a permanent document in the medical record.     Query Date: 2024    Dear Provider,  By submitting this query, we are merely seeking further clarification of documentation. Please utilize your independent clinical judgment when addressing the question(s) below.    The medical record contains the following:  Supporting Clinical Findings Location in Medical Record   Procedure:  CABG x3; lima to the LAD; reverse saphenous vein to the obtuse marginal; reverse saphenous vein to the posterior descending artery; bilateral endoscopic vein harvest of the greater saphenous veins     Went into afib RVR   Newly Diagnosed Afib RVR (Post-op)    - CHADSVASC Score 2 Points  He went into afib RVR. Resume metoprolol tartrate 25 mg BID. Unable to start amio gtt s/t liver CA/liver transplant. He spontaneously went back into NSR     Vent. Rate : 147 BPM     Atrial Rate : 000 BPM      P-R Int : 000 ms          QRS Dur : 094 ms       QT Int : 336 ms       P-R-T Axes : 000 008 -11 degrees      QTc Int : 525 ms     Atrial fibrillation with rapid ventricular response   Anterolateral infarct     Increase to metoprolol tartrate 50 mg BID. Went into RVR this am but back in NSR.   Continue Eliuqis 5 mg BID for CVA prophylaxis in the setting of PAF.   OAC for now given post op AF (developed short zafar of RVR this morning)    Op Note: Dr. Ferrell         Cards PN: Dr. Jaimes             EK/5                Cards PN: Dr. Serrano        Please clarify if __post op atrial fibrillation___ (as it relates to __CABG___) is:      [  ] Complication of the procedure   [ X ] Present, but not a complication of the procedure   [  ] Other (please specify): __________________   [  ] Clinically Undetermined       Please document in your progress  notes daily for the duration of treatment until resolved and include in your discharge summary.

## 2024-02-08 ENCOUNTER — TELEPHONE (OUTPATIENT)
Dept: CARDIAC SURGERY | Facility: CLINIC | Age: 64
End: 2024-02-08
Payer: MEDICARE

## 2024-02-09 ENCOUNTER — PATIENT OUTREACH (OUTPATIENT)
Dept: ADMINISTRATIVE | Facility: CLINIC | Age: 64
End: 2024-02-09
Payer: MEDICARE

## 2024-02-21 ENCOUNTER — EXTERNAL HOME HEALTH (OUTPATIENT)
Dept: HOME HEALTH SERVICES | Facility: HOSPITAL | Age: 64
End: 2024-02-21
Payer: MEDICARE

## 2024-02-27 ENCOUNTER — OFFICE VISIT (OUTPATIENT)
Dept: CARDIAC SURGERY | Facility: CLINIC | Age: 64
End: 2024-02-27
Payer: MEDICARE

## 2024-02-27 VITALS
HEART RATE: 63 BPM | WEIGHT: 215 LBS | BODY MASS INDEX: 30.78 KG/M2 | OXYGEN SATURATION: 93 % | SYSTOLIC BLOOD PRESSURE: 111 MMHG | DIASTOLIC BLOOD PRESSURE: 71 MMHG | HEIGHT: 70 IN

## 2024-02-27 DIAGNOSIS — I25.118 CORONARY ARTERY DISEASE OF NATIVE ARTERY OF NATIVE HEART WITH STABLE ANGINA PECTORIS: Primary | ICD-10-CM

## 2024-02-27 DIAGNOSIS — Z95.1 STATUS POST CORONARY ARTERY BYPASS GRAFTING: Primary | ICD-10-CM

## 2024-02-27 PROCEDURE — 99024 POSTOP FOLLOW-UP VISIT: CPT | Mod: ,,, | Performed by: SPECIALIST

## 2024-02-27 RX ORDER — ATORVASTATIN CALCIUM 20 MG/1
20 TABLET, FILM COATED ORAL DAILY
COMMUNITY
Start: 2024-02-16

## 2024-02-27 RX ORDER — MULTIVITAMIN
TABLET ORAL
COMMUNITY

## 2024-02-27 NOTE — PROGRESS NOTES
Patient returns about 1 month out from a three-vessel CABG.  He is doing extremely well.  He is walking well with no shortness of breath.  He has no significant incisional pain.  He just has a little bit of numbness over his left breast.  His sternum is stable.    His lungs are clear to auscultation bilaterally   Heart has a regular rate and rhythm without murmurs or gallops   Extremities have no cyanosis clubbing or edema   His incisions have all healed nicely with no signs of infection   We will continue his Eliquis for another couple of months  I will follow up long-term with cardiology  Return to clinic p.r.n.

## (undated) DEVICE — DRESSING TELFA + BARR 4X6IN

## (undated) DEVICE — INSERT INTRACK CLAMP ULT 66MM

## (undated) DEVICE — GUIDEWIRE OMNI STR TIP 185CM

## (undated) DEVICE — STOPCOCK 4-WAY

## (undated) DEVICE — SOL NACL IRR 3000ML

## (undated) DEVICE — TUBING INSUFFLATOR W/ROT CONCT

## (undated) DEVICE — INSERT STEALTH SURGICAL CLAMP

## (undated) DEVICE — KIT SURGICAL TURNOVER

## (undated) DEVICE — DRAPE ANGIO BRACH 38X44IN

## (undated) DEVICE — KIT GLIDESHEATH SLEND 6FR 10CM

## (undated) DEVICE — SUT VICRYL CTD 3-0 PS-1 18

## (undated) DEVICE — GOWN SMARTSLEEVE AAMI LVL4 XXL

## (undated) DEVICE — SYS WASTE FLD DISPOSAL 1400ML

## (undated) DEVICE — DRESSING TELFA + RECT 6X10IN

## (undated) DEVICE — CANNULA NASAL ADULT

## (undated) DEVICE — CARTRIDGE HEPARIN DOSE

## (undated) DEVICE — GLOVE COTTON KNITTED CUFF

## (undated) DEVICE — SYS VIRTUOSAPH PLUS EVM

## (undated) DEVICE — MARKER ANASTOMARK COR FLX

## (undated) DEVICE — GLOVE PROTEXIS BLUE LATEX 7

## (undated) DEVICE — PAD DEFIB CADENCE ADULT R2

## (undated) DEVICE — PENCIL ELECSURG ROCKER 15FT

## (undated) DEVICE — HOLDER STRIP-T SELF ADH 2X10IN

## (undated) DEVICE — BOWL UTILITY BLUE 32OZ

## (undated) DEVICE — NDL ASPIRATOR AIR 16G

## (undated) DEVICE — Device

## (undated) DEVICE — BLADE SCALP OPHTL BEVEL STR

## (undated) DEVICE — PUNCH AORTIC ROT TIP 4.8MM

## (undated) DEVICE — SUT VICRYL 2-0 36 CT-1

## (undated) DEVICE — CARTRIDGE SILV 4CHAN 2.0-3.5MG

## (undated) DEVICE — SET ANGIO ACIST CVI ANGIOTOUCH

## (undated) DEVICE — CANNULA MC2 NVENT .5IN 28/36FR

## (undated) DEVICE — SYR 10CC LUER LOCK

## (undated) DEVICE — SOL LAC RINGERS 1000ML INJ

## (undated) DEVICE — CARTRIDGE HEPARIN 2 CHNNL ACT

## (undated) DEVICE — BANDAGE ADHESIVE FABRIC 2X4

## (undated) DEVICE — PACK SURG PERF CARDPULM BYPS

## (undated) DEVICE — SUT 2/0 36IN ETHIBOND EXCE

## (undated) DEVICE — SYR SLIP TIP 20CC

## (undated) DEVICE — SUT PROLENE 7-0 BV-1 30 BLU

## (undated) DEVICE — SUT 2 30IN SILK BLK BRAIDE

## (undated) DEVICE — DRESSING TEGADERM CHG 3.5X4.5

## (undated) DEVICE — KIT C.A.T.S. FAST START

## (undated) DEVICE — GUIDE LAUNCHER 6FR EBU 3.5

## (undated) DEVICE — COVER FLEXI-FEEL PROBE 6X58IN

## (undated) DEVICE — APPLICATOR SURG 2 SPRY 1 PLUNG

## (undated) DEVICE — GUIDE LAUNCHER 6FR JR 4.0

## (undated) DEVICE — CANNULA NON-VENT 24FR 14.5IN

## (undated) DEVICE — GLOVE PROTEXIS LTX MICRO 8

## (undated) DEVICE — CATH CATHLON IV 16G 2IN

## (undated) DEVICE — GLOVE PROTEXIS HYDROGEL SZ6.5

## (undated) DEVICE — HEMOCONCENTRATOR W TBNG ADPT

## (undated) DEVICE — KIT CATHGARD DBL LUMN 9FRX11.5

## (undated) DEVICE — SOL NORMAL USPCA 0.9%

## (undated) DEVICE — CATH RAD OPTITORQUE 5FR 110CM

## (undated) DEVICE — TRAY CATH FOL SIL TEMP 10 16FR

## (undated) DEVICE — APPLICATOR ENDOSCP 32CM5MM2TIP

## (undated) DEVICE — SUT VICRYL 3-0 8-18 PS-1

## (undated) DEVICE — GUIDEWIRE INQWIRE SE 3MM JTIP

## (undated) DEVICE — SOL PLASMALYTE PH 7.4 1000ML

## (undated) DEVICE — SENSOR LOW LEVEL OXYGEN

## (undated) DEVICE — STOPCOCK 3-WAY

## (undated) DEVICE — SOL .9NACL PF 100 ML

## (undated) DEVICE — DRAIN CHEST DRY SUCTION

## (undated) DEVICE — HEMOCONCENTRATOR PED .09M2

## (undated) DEVICE — SOL ELECTROLYTE PH 7.4 500ML

## (undated) DEVICE — VALVE GUARDIAN II HEMOSTASIS

## (undated) DEVICE — HEMOSTAT SURGICEL FIBRLR 2X4IN

## (undated) DEVICE — SUT ETHBND XTRA 1 OS-8 30IN

## (undated) DEVICE — CATH SWAN GANZ 8FR HEP FREE

## (undated) DEVICE — BOWL STERILE LARGE 32OZ

## (undated) DEVICE — BAG MEDI-PLAST DECANTER C-FLOW

## (undated) DEVICE — GLOVE PROTEXIS PI SYN SURG 7.5

## (undated) DEVICE — SUT SILK 2-0 BLK BR KS 30 I

## (undated) DEVICE — DEVICE PLASMABLADE X 3.0S LT

## (undated) DEVICE — CONTAINER SPECIMEN SCREW 4OZ

## (undated) DEVICE — BAND TR COMP DEVICE REG 24CM

## (undated) DEVICE — DRAPE SLUSH WARMER WITH DISC

## (undated) DEVICE — SUT PROLENE 7-0 BV175-6